# Patient Record
Sex: FEMALE | Race: WHITE | NOT HISPANIC OR LATINO | Employment: OTHER | URBAN - METROPOLITAN AREA
[De-identification: names, ages, dates, MRNs, and addresses within clinical notes are randomized per-mention and may not be internally consistent; named-entity substitution may affect disease eponyms.]

---

## 2019-12-20 ENCOUNTER — RECORDS - HEALTHEAST (OUTPATIENT)
Dept: LAB | Facility: CLINIC | Age: 79
End: 2019-12-20

## 2019-12-20 LAB
ANION GAP SERPL CALCULATED.3IONS-SCNC: 8 MMOL/L (ref 5–18)
BUN SERPL-MCNC: 34 MG/DL (ref 8–28)
CALCIUM SERPL-MCNC: 9.2 MG/DL (ref 8.5–10.5)
CHLORIDE BLD-SCNC: 104 MMOL/L (ref 98–107)
CO2 SERPL-SCNC: 26 MMOL/L (ref 22–31)
CREAT SERPL-MCNC: 1.28 MG/DL (ref 0.6–1.1)
ERYTHROCYTE [DISTWIDTH] IN BLOOD BY AUTOMATED COUNT: 16.9 % (ref 11–14.5)
GFR SERPL CREATININE-BSD FRML MDRD: 40 ML/MIN/1.73M2
GLUCOSE BLD-MCNC: 126 MG/DL (ref 70–125)
HCT VFR BLD AUTO: 26.6 % (ref 35–47)
HGB BLD-MCNC: 7.9 G/DL (ref 12–16)
MCH RBC QN AUTO: 25.1 PG (ref 27–34)
MCHC RBC AUTO-ENTMCNC: 29.7 G/DL (ref 32–36)
MCV RBC AUTO: 84 FL (ref 80–100)
PLATELET # BLD AUTO: 270 THOU/UL (ref 140–440)
PMV BLD AUTO: 11.1 FL (ref 8.5–12.5)
POTASSIUM BLD-SCNC: 4.4 MMOL/L (ref 3.5–5)
RBC # BLD AUTO: 3.15 MILL/UL (ref 3.8–5.4)
SODIUM SERPL-SCNC: 138 MMOL/L (ref 136–145)
WBC: 9.4 THOU/UL (ref 4–11)

## 2019-12-23 ENCOUNTER — RECORDS - HEALTHEAST (OUTPATIENT)
Dept: LAB | Facility: CLINIC | Age: 79
End: 2019-12-23

## 2019-12-23 LAB
ERYTHROCYTE [DISTWIDTH] IN BLOOD BY AUTOMATED COUNT: 17 % (ref 11–14.5)
HCT VFR BLD AUTO: 28.1 % (ref 35–47)
HGB BLD-MCNC: 8 G/DL (ref 12–16)
INR PPP: 4.21 (ref 0.9–1.1)
MCH RBC QN AUTO: 24.8 PG (ref 27–34)
MCHC RBC AUTO-ENTMCNC: 28.5 G/DL (ref 32–36)
MCV RBC AUTO: 87 FL (ref 80–100)
PLATELET # BLD AUTO: 324 THOU/UL (ref 140–440)
PMV BLD AUTO: 11.1 FL (ref 8.5–12.5)
RBC # BLD AUTO: 3.23 MILL/UL (ref 3.8–5.4)
WBC: 7.3 THOU/UL (ref 4–11)

## 2019-12-25 ENCOUNTER — RECORDS - HEALTHEAST (OUTPATIENT)
Dept: LAB | Facility: CLINIC | Age: 79
End: 2019-12-25

## 2019-12-25 LAB
C DIFF TOX B STL QL: NEGATIVE
RIBOTYPE 027/NAP1/BI: NORMAL

## 2019-12-31 ENCOUNTER — RECORDS - HEALTHEAST (OUTPATIENT)
Dept: LAB | Facility: CLINIC | Age: 79
End: 2019-12-31

## 2019-12-31 LAB
ANION GAP SERPL CALCULATED.3IONS-SCNC: 10 MMOL/L (ref 5–18)
BUN SERPL-MCNC: 19 MG/DL (ref 8–28)
CALCIUM SERPL-MCNC: 9.6 MG/DL (ref 8.5–10.5)
CHLORIDE BLD-SCNC: 103 MMOL/L (ref 98–107)
CO2 SERPL-SCNC: 27 MMOL/L (ref 22–31)
CREAT SERPL-MCNC: 1.09 MG/DL (ref 0.6–1.1)
ERYTHROCYTE [DISTWIDTH] IN BLOOD BY AUTOMATED COUNT: 18.2 % (ref 11–14.5)
GFR SERPL CREATININE-BSD FRML MDRD: 49 ML/MIN/1.73M2
GLUCOSE BLD-MCNC: 142 MG/DL (ref 70–125)
HCT VFR BLD AUTO: 31.1 % (ref 35–47)
HGB BLD-MCNC: 9.2 G/DL (ref 12–16)
MAGNESIUM SERPL-MCNC: 1.6 MG/DL (ref 1.8–2.6)
MCH RBC QN AUTO: 25.4 PG (ref 27–34)
MCHC RBC AUTO-ENTMCNC: 29.6 G/DL (ref 32–36)
MCV RBC AUTO: 86 FL (ref 80–100)
PLATELET # BLD AUTO: 504 THOU/UL (ref 140–440)
PMV BLD AUTO: 10.5 FL (ref 8.5–12.5)
POTASSIUM BLD-SCNC: 4.5 MMOL/L (ref 3.5–5)
RBC # BLD AUTO: 3.62 MILL/UL (ref 3.8–5.4)
SODIUM SERPL-SCNC: 140 MMOL/L (ref 136–145)
WBC: 7.2 THOU/UL (ref 4–11)

## 2022-04-14 ENCOUNTER — TRANSCRIBE ORDERS (OUTPATIENT)
Dept: VASCULAR SURGERY | Facility: CLINIC | Age: 82
End: 2022-04-14
Payer: COMMERCIAL

## 2022-04-14 DIAGNOSIS — L97.511 RIGHT FOOT ULCER, LIMITED TO BREAKDOWN OF SKIN (H): Primary | ICD-10-CM

## 2022-04-14 DIAGNOSIS — E11.42 DIABETIC POLYNEUROPATHY (H): ICD-10-CM

## 2022-04-29 ENCOUNTER — OFFICE VISIT (OUTPATIENT)
Dept: VASCULAR SURGERY | Facility: CLINIC | Age: 82
End: 2022-04-29
Attending: PODIATRIST
Payer: COMMERCIAL

## 2022-04-29 ENCOUNTER — ANCILLARY PROCEDURE (OUTPATIENT)
Dept: VASCULAR ULTRASOUND | Facility: CLINIC | Age: 82
End: 2022-04-29
Attending: PODIATRIST
Payer: COMMERCIAL

## 2022-04-29 VITALS — TEMPERATURE: 97.6 F | HEART RATE: 88 BPM | DIASTOLIC BLOOD PRESSURE: 72 MMHG | SYSTOLIC BLOOD PRESSURE: 142 MMHG

## 2022-04-29 DIAGNOSIS — E11.42 DIABETIC POLYNEUROPATHY (H): ICD-10-CM

## 2022-04-29 DIAGNOSIS — M25.371 ANKLE INSTABILITY, RIGHT: ICD-10-CM

## 2022-04-29 DIAGNOSIS — L97.511 RIGHT FOOT ULCER, LIMITED TO BREAKDOWN OF SKIN (H): ICD-10-CM

## 2022-04-29 DIAGNOSIS — L97.511 DIABETIC ULCER OF TOE OF RIGHT FOOT ASSOCIATED WITH TYPE 2 DIABETES MELLITUS, LIMITED TO BREAKDOWN OF SKIN (H): Primary | ICD-10-CM

## 2022-04-29 DIAGNOSIS — E11.621 DIABETIC ULCER OF TOE OF RIGHT FOOT ASSOCIATED WITH TYPE 2 DIABETES MELLITUS, LIMITED TO BREAKDOWN OF SKIN (H): Primary | ICD-10-CM

## 2022-04-29 PROCEDURE — 93922 UPR/L XTREMITY ART 2 LEVELS: CPT | Mod: 26 | Performed by: SURGERY

## 2022-04-29 PROCEDURE — 93922 UPR/L XTREMITY ART 2 LEVELS: CPT

## 2022-04-29 PROCEDURE — 93925 LOWER EXTREMITY STUDY: CPT | Mod: 26 | Performed by: SURGERY

## 2022-04-29 PROCEDURE — 99203 OFFICE O/P NEW LOW 30 MIN: CPT | Mod: 25 | Performed by: PODIATRIST

## 2022-04-29 PROCEDURE — 93925 LOWER EXTREMITY STUDY: CPT

## 2022-04-29 PROCEDURE — 11042 DBRDMT SUBQ TIS 1ST 20SQCM/<: CPT | Performed by: PODIATRIST

## 2022-04-29 PROCEDURE — G0463 HOSPITAL OUTPT CLINIC VISIT: HCPCS

## 2022-04-29 RX ORDER — GLIPIZIDE 10 MG/1
10 TABLET, FILM COATED, EXTENDED RELEASE ORAL 2 TIMES DAILY
COMMUNITY
Start: 2021-10-21 | End: 2023-04-26

## 2022-04-29 RX ORDER — FUROSEMIDE 20 MG
40 TABLET ORAL DAILY
COMMUNITY
Start: 2022-01-26

## 2022-04-29 RX ORDER — ATORVASTATIN CALCIUM 80 MG/1
TABLET, FILM COATED ORAL
COMMUNITY
Start: 2022-01-26 | End: 2023-01-25

## 2022-04-29 RX ORDER — ALLOPURINOL 100 MG/1
200 TABLET ORAL
COMMUNITY
Start: 2022-02-08 | End: 2023-01-25

## 2022-04-29 RX ORDER — WARFARIN SODIUM 2 MG/1
TABLET ORAL
COMMUNITY
Start: 2021-04-09

## 2022-04-29 RX ORDER — ASPIRIN 81 MG/1
TABLET, CHEWABLE ORAL
COMMUNITY

## 2022-04-29 RX ORDER — BLOOD SUGAR DIAGNOSTIC
1 STRIP MISCELLANEOUS DAILY
COMMUNITY
Start: 2021-11-23

## 2022-04-29 RX ORDER — MULTIVITAMIN WITH IRON
2 TABLET ORAL 2 TIMES DAILY
COMMUNITY

## 2022-04-29 RX ORDER — SODIUM FLUORIDE 5 MG/G
GEL, DENTIFRICE DENTAL
COMMUNITY
Start: 2021-09-20

## 2022-04-29 RX ORDER — HYOSCYAMINE SULFATE 0.125 MG
0.12 TABLET ORAL
COMMUNITY
Start: 2022-03-22

## 2022-04-29 RX ORDER — AMLODIPINE BESYLATE 5 MG/1
TABLET ORAL
COMMUNITY
Start: 2022-04-25

## 2022-04-29 RX ORDER — METOPROLOL SUCCINATE 50 MG/1
150 TABLET, EXTENDED RELEASE ORAL
COMMUNITY
Start: 2022-01-26

## 2022-04-29 RX ORDER — LINAGLIPTIN 5 MG/1
5 TABLET, FILM COATED ORAL DAILY
COMMUNITY
Start: 2022-01-03 | End: 2023-01-25 | Stop reason: ALTCHOICE

## 2022-04-29 RX ORDER — CILOSTAZOL 100 MG/1
100 TABLET ORAL 2 TIMES DAILY
COMMUNITY
Start: 2021-08-26 | End: 2022-08-26

## 2022-04-29 RX ORDER — BUPROPION HYDROCHLORIDE 75 MG/1
TABLET ORAL
COMMUNITY
Start: 2021-08-27

## 2022-04-29 RX ORDER — LISINOPRIL 5 MG/1
5 TABLET ORAL DAILY
COMMUNITY
Start: 2022-03-01

## 2022-04-29 ASSESSMENT — PAIN SCALES - GENERAL: PAINLEVEL: NO PAIN (0)

## 2022-04-29 NOTE — PATIENT INSTRUCTIONS
"Important lnstructions    Stop upstairs to [address] &   your CAM boot & wheelchair .    Call [phone] to schedule your MRI      WEIGHT BEARING STATUS: You are to remain NON WEIGHT BEARING on your right foot. NON WEIGHT BEARING MEANS NO PRESSURE ON YOUR FOOT OR HEEL AT ANY TIME FOR ANY REASON!    2. OFFLOADING DEVICE: Must use a A WHEELCHAIR at all times! (do not use affected foot to push wheelchair)    3. STABILIZATION DEVICE: Use a CAM BOOT . You will need to WEAR THIS ANYTIME YOU ARE UP AND OUT OF BED, IT IS OKAY TO REMOVE WHEN YOU ARE SLEEPING..       4. ELEVATE: Elevating your leg means laying with your head on a pillow and your foot ABOVE YOUR WAIST.     5. DO NOT MOVE YOUR FOOT.  There is a risk of worsening the wound or incision. To give yourself a higher chance of healing, please DO NOT swing foot back and forth and wiggle foot/toes especially when inside a stabilization device.        Dressing Change lnstructions                 EVERY OTHER DAY and as needed, Cleanse your foot wound(s) with Normal saline.    Pat Dry with non-sterile gauze    Primary Dressing: Apply Medihoney or Manuka honey into/onto the wounds    Secondary dressing: Cover with dry gauze    Secure with non-sterile roll gauze (4\" x 75\" roll) and tape (1\" roll tape) as needed; avoid adhesive directly on the skin    It IS NOT ok to get your wound wet in the bath or shower    SEEK MEDICAL CARE IF:  You have an increase in swelling, pain, or redness around the wound.  You have an increase in the amount of pus coming from the wound.  There is a bad smell coming from the wound.  The wound appears to be worsening/enlarging  You have a fever greater than 101.5 F      It is ok to continue current wound care treatment/products for the next 2-3 days until new wound care supplies are ordered and arrive. If longer than this please contact our office at [phone].        We want to hear from you!   In the next few weeks, you should receive " a call or email to complete a survey about your visit at [facility]. Please help us improve your appointment experience by letting us know how we did today. We strive to make your experience good and value any ways in which we could do better.      We value your input and suggestions.    Thank you for choosing the [facility]!

## 2022-04-29 NOTE — PROGRESS NOTES
FOOT AND ANKLE SURGERY/PODIATRY CONSULT NOTE        ASSESSMENT: Diabetic Ulceration right hallux         TREATMENT:  -The right hallux ulceration is stable, no signs of infection.     -I discussed the principles of wound healing today including the importance of limited walking on the involved limb, good vascular perfusion, good glycemic control and the absence of infection.     -Due to chronicity of the wound and pain, I have referred her for an MRI to evaluate for underlying osteomyelitis.     -MADHU's indicate TBI's adequate for wound healing.     -HbA1c from 3/4 was 8.7. Referred to endocrinology.     -Referred for a CAM boot and wheelchair.     -After discussion of risk factors and consent obtained 2% Lidocaine HCL jelly was applied, under clean conditions, the right and foot ulceration(s) were debrided using .into the subcutaneous tissue.  Devitalized and nonviable tissue, along with any fibrin and slough, was removed to improve granulation tissue formation, stimulate wound healing, decrease overall bacteria load, disrupt biofilm formation and decrease edge senescence. Wound drainage was scant No. Total excisional debridement was 0.15 sq cm into the subcutaneous tissue with a depth of 0.3 cm.   Ulcers were improved afterwards and .  Measures were as noted on the flow sheet. Medi-honey with a gauze dressing was applied. She will continue to apply Medi-honey with a gauze dressing qoday.    -She will follow-up in 3 weeks. I will contact the patient with the MRI report when available and we will be guided by the results.     Wily Enciso DPM  [facility]      HPI: Taylor Sanford was seen today for a sore on her right hallux. The patient states she has had the sore for 1-2 years, and has seen a podiatrist at [facility]. She has not noticed improvement and would like a second opinion. She also complains of pain in the right hallux. PMH significant for DM2.     No past medical  history on file.    No past surgical history on file.     Allergies   Allergen Reactions     Amiodarone      Developed Thyroid toxicity     Sulfonylureas Rash         Current Outpatient Medications:      allopurinol (ZYLOPRIM) 100 MG tablet, Take 200 mg by mouth, Disp: , Rfl:      amLODIPine (NORVASC) 5 MG tablet, amlodipine 5 mg tablet, Disp: , Rfl:      aspirin (ASA) 81 MG chewable tablet, Asprin Ec Low Dose, Disp: , Rfl:      atorvastatin (LIPITOR) 80 MG tablet, atorvastatin 80 mg tablet, Disp: , Rfl:      blood glucose (ACCU-CHEK ADIS PLUS) test strip, USE TO TEST THREE TIMES DAILY, Disp: , Rfl:      buPROPion (WELLBUTRIN) 75 MG tablet, bupropion HCl 75 mg tablet, Disp: , Rfl:      cilostazol (PLETAL) 100 MG tablet, Take 100 mg by mouth, Disp: , Rfl:      furosemide (LASIX) 20 MG tablet, furosemide 20 mg tablet  TAKE 1 TABLET BY MOUTH DAILY, Disp: , Rfl:      glipiZIDE (GLUCOTROL XL) 10 MG 24 hr tablet, Take 10 mg by mouth, Disp: , Rfl:      hyoscyamine (LEVSIN) 0.125 MG tablet, Take 0.125 mg by mouth, Disp: , Rfl:      linagliptin (TRADJENTA) 5 MG TABS tablet, Tradjenta 5 mg tablet  TAKE 1 TABLET BY MOUTH DAILY, Disp: , Rfl:      lisinopril (ZESTRIL) 5 MG tablet, lisinopril 5 mg tablet, Disp: , Rfl:      magnesium 100 MG TABS, magnesium, Disp: , Rfl:      metoprolol succinate ER (TOPROL-XL) 50 MG 24 hr tablet, metoprolol succinate ER 50 mg tablet,extended release 24 hr, Disp: , Rfl:      omeprazole (PRILOSEC) 20 MG DR capsule, TAKE 1 CAPSULE DAILY AS NEEDED 1 HOUR BEFORE A MEAL FOR HEARTBURN. GENERIC FOR PRILOSEC, Disp: , Rfl:      sodium fluoride dental gel (PREVIDENT) 1.1 % GEL topical gel, Brush 2x/day.  Do not eat or drink for 30 minutes after., Disp: , Rfl:      warfarin ANTICOAGULANT (COUMADIN) 2 MG tablet, warfarin 2 mg tablet, Disp: , Rfl:     Social History     Social History Narrative     Not on file       No family history on file.    Review of Systems - 10 point Review of Systems is negative except  for right hallux ulcer which is noted in HPI.      OBJECTIVE:  Appearance: alert, well appearing, and in no distress.    BP (!) 142/72   Pulse 88   Temp 97.6  F (36.4  C)     BMI= There is no height or weight on file to calculate BMI.    General appearance: Patient is alert and fully cooperative with history & exam.  No sign of distress is noted during the visit.     Psychiatric: Affect is pleasant & appropriate.  Patient appears motivated to improve health.     Respiratory: Breathing is regular & unlabored while sitting.     HEENT: Hearing is intact to spoken word.  Speech is clear.  No gross evidence of visual impairment that would impact ambulation.    Vascular: Dorsalis pedis trace palpable palpableRight.  Dermatologic:   VASC Wound RIGHT HALLUX (Active)   Post Size Length 0.5 04/29/22 1200   Post Size Width 0.3 04/29/22 1200   Post Size Depth 0.3 04/29/22 1200   Post Total Sq cm 0.15 04/29/22 1200   Ulceration plantar medial right hallux has a granular base, slight increased depth, no erythema.   Neurologic: Diminished to light touch Right.  Musculoskeletal: Contracted digits noted Right.    @LDAVASC(10,16,17)@    Imaging:     No results found.

## 2022-05-04 ENCOUNTER — MEDICAL CORRESPONDENCE (OUTPATIENT)
Dept: HEALTH INFORMATION MANAGEMENT | Facility: CLINIC | Age: 82
End: 2022-05-04

## 2022-05-04 ENCOUNTER — OFFICE VISIT (OUTPATIENT)
Dept: ENDOCRINOLOGY | Facility: CLINIC | Age: 82
End: 2022-05-04
Attending: PODIATRIST
Payer: COMMERCIAL

## 2022-05-04 VITALS — DIASTOLIC BLOOD PRESSURE: 60 MMHG | SYSTOLIC BLOOD PRESSURE: 110 MMHG | WEIGHT: 221.5 LBS | HEART RATE: 76 BPM

## 2022-05-04 DIAGNOSIS — E11.621 DIABETIC ULCER OF TOE OF RIGHT FOOT ASSOCIATED WITH TYPE 2 DIABETES MELLITUS, LIMITED TO BREAKDOWN OF SKIN (H): ICD-10-CM

## 2022-05-04 DIAGNOSIS — L97.511 DIABETIC ULCER OF TOE OF RIGHT FOOT ASSOCIATED WITH TYPE 2 DIABETES MELLITUS, LIMITED TO BREAKDOWN OF SKIN (H): ICD-10-CM

## 2022-05-04 PROBLEM — I48.0 PAROXYSMAL ATRIAL FIBRILLATION (H): Status: ACTIVE | Noted: 2022-05-04

## 2022-05-04 PROBLEM — E78.2 MIXED HYPERLIPIDEMIA: Status: ACTIVE | Noted: 2022-05-04

## 2022-05-04 PROBLEM — I10 BENIGN ESSENTIAL HYPERTENSION: Status: ACTIVE | Noted: 2022-05-04

## 2022-05-04 PROBLEM — I25.10 CAD (CORONARY ARTERY DISEASE): Status: ACTIVE | Noted: 2022-05-04

## 2022-05-04 PROCEDURE — 99205 OFFICE O/P NEW HI 60 MIN: CPT | Performed by: INTERNAL MEDICINE

## 2022-05-04 RX ORDER — ACARBOSE 25 MG/1
TABLET ORAL
Qty: 180 TABLET | Refills: 1 | Status: SHIPPED | OUTPATIENT
Start: 2022-05-04 | End: 2023-01-25 | Stop reason: SINTOL

## 2022-05-04 NOTE — LETTER
5/4/2022         RE: Taylor Sanford  [address] [address]        Dear Colleague,    Thank you for referring your patient, Taylor Sanford, to the [facility]. Please see a copy of my visit note below.      ENDOCRINOLOGY NEW PATIENT VISIT        HISTORY OF PRESENT ILLNESS    Taylor Sanford is seen in consultation at the request of Dr. Enciso for diabetes.    Patient estimates she was diagnosed with type 2 diabetes approximately 25 years ago.  Initially diet controlled.  Has since been on oral medication regimen: Metformin had to be discontinued due to change in kidney function.    Her history is remarkable for chronic kidney disease (does not recall being evaluated in nephrology), atrial fibrillation, coronary artery disease status post PCI in 2002, hyperlipidemia, hypertension and depression.    Current diabetes regimen: Tradjenta 5 mg daily, glipizide XL 10 mg twice daily.    Would like to avoid injectable medications as much as possible.    No known diabetic retinopathy: Has annual eye exam at [facility].  Has been noting more right eye pain.  Has peripheral neuropathy: Endorses numbness and tingling in feet.  Also following in podiatry with Dr. Enciso for right hallux ulceration.    Has had more stress recently: Her brother passed away, struggles with urine and stool incontinence, has foot ulceration for which she is following in podiatry.  All of these factors have contributed to her not eating as well as she would like.    Typically has 2-3 meals per day (more recently, has been having a small snack rather than a lunchtime meal).  Breakfast is usually cereal or oatmeal with fruit.  Lunch may be cheese or yogurt.  Dinner is her largest meal of the day (yesterday had to turkey meatballs and [place] rice).  Often snacks on foods in the evening.    Typically checks glucose first thing in the morning.  I reviewed glucose meter data.  Average glucose 161  in the past 2 weeks.  No recorded hypoglycemia.    She does not have history of hypoglycemia requiring hospitalization.  No history of severe hypoglycemia requiring the aid of another person or causing loss of consciousness.    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 Thyroid ultrasound 2011 showed heterogenous thyroid parenchyma: two subcentimeter thyroid nodules were noted, one in left and another in right lobe.    Most recent thyroid US 9/24/2013 showed the following:  FINDINGS:   The right lobe measures 4.7 x 2.3 x 2.1 cm. There is a spongiform 0.4 x 0.3    x 0.4 cm nodule in the midpole, previously measuring 0.3 x 0.2 cm.     The left lobe measures 4.1 x 1.6 x 1.5 cm.  There is an isoechoic nodule in    the lower pole measuring 0.6 x 0.5 x 0.5 cm unchanged. It has little   vascularity.     The isthmus measures 0.4 cm. There are no nodules present     IMPRESSION: No significant change from the previous exam. Bilateral   subcentimeter thyroid nodules that appear benign.      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  Had 2 children, a son and a daughter; her daughter passed away.    PAST MEDICAL HISTORY  Past Medical History:   Diagnosis Date     Benign essential hypertension      CAD (coronary artery disease)      Mixed hyperlipidemia      Paroxysmal atrial fibrillation (H)      Type 2 diabetes mellitus with skin complication, without long-term current use of insulin (H)        MEDICATIONS  Current Outpatient Medications   Medication Sig Dispense Refill     allopurinol (ZYLOPRIM) 100 MG tablet Take 200 mg by mouth       amLODIPine (NORVASC) 5 MG tablet amlodipine 5 mg tablet       aspirin (ASA) 81 MG chewable tablet Asprin Ec Low Dose        atorvastatin (LIPITOR) 80 MG tablet atorvastatin 80 mg tablet       buPROPion (WELLBUTRIN) 75 MG tablet One and a half tablets bid       Calcium Citrate (CITRACAL OR) Take 1 tablet by mouth daily       cilostazol (PLETAL) 100 MG tablet Take 100 mg by mouth 2 times daily       furosemide (LASIX) 20 MG tablet 40 mg daily       glipiZIDE (GLUCOTROL XL) 10 MG 24 hr tablet Take 10 mg by mouth 2 times daily       linagliptin (TRADJENTA) 5 MG TABS tablet 5 mg daily       lisinopril (ZESTRIL) 5 MG tablet 5 mg daily       magnesium 250 MG tablet 2 tablets 2 times daily       metoprolol succinate ER (TOPROL-XL) 50 MG 24 hr tablet 150 mg       omeprazole (PRILOSEC) 20 MG DR capsule TAKE 1 CAPSULE DAILY AS NEEDED 1 HOUR BEFORE A MEAL FOR HEARTBURN. GENERIC FOR PRILOSEC       sodium fluoride dental gel (PREVIDENT) 1.1 % GEL topical gel Brush 2x/day.  Do not eat or drink for 30 minutes after.       warfarin ANTICOAGULANT (COUMADIN) 2 MG tablet warfarin 2 mg tablet       blood glucose (ACCU-CHEK ADIS PLUS) test strip USE TO TEST THREE TIMES DAILY       hyoscyamine (LEVSIN) 0.125 MG tablet Take 0.125 mg by mouth (Patient not taking: Reported on 5/4/2022)         Allergies, family, and social history were reviewed and documented as needed in EHR.     REVIEW OF SYSTEMS  A complete 10-point ROS was performed and pertinent positives and negatives are noted in the HPI.     PHYSICAL EXAM  /60 (BP Location: Right arm, Patient Position: Sitting, Cuff Size: Adult Large)   Pulse 76   Wt 100.5 kg (221 lb 8 oz)   There is no height or weight on file to calculate BMI.  Constitutional: Vital signs reviewed, as recorded above. Patient is alert, oriented and appears in no acute distress.  Eyes: PER, EOMI, no stare, lid lag, or retraction; no conjunctival injection.  Neck: Neck supple, no palpable thyromegaly.  Lymphatic: No cervical or supraclavicular LAD.  Cardiovascular: RRR, normal S1/S2, no audible murmurs, rubs or gallops,  bilateral 1+ LE edema.  Respiratory: CTAB, without wheezes, crackles or rhonchi; normal chest wall motion and respiratory effort.  GI: Soft.  MSK: No clubbing or cyanosis; normal muscle bulk and tone.  Skin: Normal skin color, temperature, turgor and texture.  Neurological: Alert and oriented times 3. No tremor.    Foot exam deferred today, right hallux ulceration is dressed.    DATA REVIEW  Each of the following laboratory and/or imaging studies were reviewed.  Reviewed outside results from [facility] in Care Everywhere.  3/4/2022: A1c 8.7 (8.3 in 12/2021, 7.9 in 9/2021, 7.7 in 7/2021)  10/7/2021: Creatinine 1.62, EGFR 30    ASSESSMENT  1.  Diabetes mellitus, type 2.  With hyperglycemia based on last hemoglobin A1c, although the patient is checking fasting glucose only and we may not be capturing hypoglycemia on fingerstick glucose data.  CKD limits our medication options.  Given coexisting coronary artery disease, would be a candidate for GLP-1 receptor agonist; the patient is hesitant to use an injectable medication and moreover does not think she would increase hydration with this class of drugs due to issues with urinary incontinence (an important consideration in context of CKD to avoid worsening renal function with addition of this class of drug).  She is already on Tradjenta and glipizide at maximal doses, so would consider adding acarbose as an option for an oral medication--we discussed benefits and potential side effects.  She will also work on dietary changes aimed at improving glycemic control (does snack on foods which contain refined sugars): I will refer to diabetes care and  for guidance on this change.    2.  Diabetes preventive care.  -No known diabetic retinopathy, has annual eye exam at [facility]; my review of Care Everywhere records indicated no DR on eye exam in 7/2021  -CKD, does not follow in nephrology; check urine microalbumin screen at next visit, follow  creatinine  -Has peripheral neuropathic symptoms, following with Dr. Enciso for right hallux ulceration    3.  Hypertension.  Blood pressure controlled.    4.  Hyperlipidemia.  On statin therapy.    5.  Coronary artery disease and paroxysmal atrial fibrillation.  Following in cardiology.    6. History of hyperthyroidism. Clinically appears euthyroid. Check thyroid function tests with next lab draw.    7. History of thyroid nodules. Subcentimeter and stable based on last US report from 2013. Consider follow-up US in the future, once more acute issues have stabilized.    PLAN  -Start Acarbose 25 mg with first bite of breakfast and dinner  -Continue Tradjenta and glipizide without changes  -Referral to diabetes education--please work on dietary changes to minimize refined sugars  -Check blood glucose once daily but at alternating times, first thin in the morning on one day and before dinner on another; also check with any unusual symptoms that could be due to low blood glucose  -Return for a follow-up visit in 2 months, with labs before visit  -We will communicate results by letter, or if needed by phone      Orders Placed This Encounter   Procedures     Hemoglobin A1c     Comprehensive metabolic panel     TSH with free T4 reflex     Albumin Random Urine Quantitative with Creat Ratio     AMB Adult Diabetes Educator Referral         I spent a total of 74 minutes on the date of encounter reviewing medical records, evaluating the patient, coordinating care and documenting in the EHR, as detailed above.      Adriano Sampson MD   Division of Diabetes, Endocrinology and Metabolism  Department of Medicine      cc: Wily Enciso DPM; Pelon Drew MD             Again, thank you for allowing me to participate in the care of your patient.        Sincerely,        ADRIANO Sampson MD

## 2022-05-04 NOTE — PATIENT INSTRUCTIONS
-Start Acarbose 25 mg with first bite of breakfast and dinner  -Continue Tradjenta and glipizide without changes  -Referral to diabetes education--please work on dietary changes to minimize refined sugars  -Check blood glucose once daily but at alternating times, first thin in the morning on one day and before dinner on another; also check with any unusual symptoms that could be due to low blood glucose  -Return for a follow-up visit in 2 months, with labs before visit  -We will communicate results by letter, or if needed by phone

## 2022-05-20 ENCOUNTER — OFFICE VISIT (OUTPATIENT)
Dept: VASCULAR SURGERY | Facility: CLINIC | Age: 82
End: 2022-05-20
Attending: PODIATRIST
Payer: COMMERCIAL

## 2022-05-20 ENCOUNTER — HOSPITAL ENCOUNTER (OUTPATIENT)
Dept: GENERAL RADIOLOGY | Facility: HOSPITAL | Age: 82
Discharge: HOME | End: 2022-05-20
Attending: PODIATRIST
Payer: COMMERCIAL

## 2022-05-20 VITALS — SYSTOLIC BLOOD PRESSURE: 128 MMHG | TEMPERATURE: 97.7 F | DIASTOLIC BLOOD PRESSURE: 66 MMHG | HEART RATE: 72 BPM

## 2022-05-20 DIAGNOSIS — S90.121A CONTUSION OF THIRD TOE OF RIGHT FOOT, INITIAL ENCOUNTER: ICD-10-CM

## 2022-05-20 DIAGNOSIS — L97.511 DIABETIC ULCER OF TOE OF RIGHT FOOT ASSOCIATED WITH TYPE 2 DIABETES MELLITUS, LIMITED TO BREAKDOWN OF SKIN (H): Primary | ICD-10-CM

## 2022-05-20 DIAGNOSIS — E11.621 DIABETIC ULCER OF TOE OF RIGHT FOOT ASSOCIATED WITH TYPE 2 DIABETES MELLITUS, LIMITED TO BREAKDOWN OF SKIN (H): Primary | ICD-10-CM

## 2022-05-20 PROCEDURE — 73630 X-RAY EXAM OF FOOT: CPT | Mod: 26 | Performed by: PODIATRIST

## 2022-05-20 PROCEDURE — 73630 X-RAY EXAM OF FOOT: CPT | Mod: RT,FY

## 2022-05-20 PROCEDURE — 99213 OFFICE O/P EST LOW 20 MIN: CPT | Mod: 25 | Performed by: PODIATRIST

## 2022-05-20 PROCEDURE — 11042 DBRDMT SUBQ TIS 1ST 20SQCM/<: CPT | Performed by: PODIATRIST

## 2022-05-20 ASSESSMENT — PAIN SCALES - GENERAL: PAINLEVEL: NO PAIN (0)

## 2022-05-20 NOTE — PATIENT INSTRUCTIONS
"Important lnstructions    STOP DOWN STAIRS FOR YOUR XRAY       WEIGHT BEARING STATUS: You are to remain LIMITED WEIGHT BEARING on your right foot. LIMITED WEIGHT BEARING MEANS THAT IT IS ONLY OKAY FOR YOU TO APPLY LIGHT PRESSURE ON THE AFFECTED FOOT WHEN TRANSFERRING FROM YOUR ASSISTIVE DEVICE TO A CHAIR OR BED.    2. OFFLOADING DEVICE: Must use a A WHEELCHAIR at all times! (do not use affected foot to push wheelchair)    3. STABILIZATION DEVICE: Use a CAM BOOT . You will need to WEAR THIS ANYTIME YOU ARE UP AND OUT OF BED, IT IS OKAY TO REMOVE WHEN YOU ARE SLEEPING..       4. ELEVATE: Elevating your leg means laying with your head on a pillow and your foot ABOVE YOUR WAIST.     5. DO NOT MOVE YOUR FOOT.  There is a risk of worsening the wound or incision. To give yourself a higher chance of healing, please DO NOT swing foot back and forth and wiggle foot/toes especially when inside a stabilization device.        Dressing Change lnstructions                 EVERY OTHER DAY and as needed, Cleanse your foot wound(s) with Normal saline.    Pat Dry with non-sterile gauze    Primary Dressing: Apply Medihoney or Manuka honey into/onto the wounds    Secondary dressing: Cover with dry gauze    Secure with non-sterile roll gauze (4\" x 75\" roll) and tape (1\" roll tape) as needed; avoid adhesive directly on the skin    It IS NOT ok to get your wound wet in the bath or shower    SEEK MEDICAL CARE IF:  You have an increase in swelling, pain, or redness around the wound.  You have an increase in the amount of pus coming from the wound.  There is a bad smell coming from the wound.  The wound appears to be worsening/enlarging  You have a fever greater than 101.5 F      It is ok to continue current wound care treatment/products for the next 2-3 days until new wound care supplies are ordered and arrive. If longer than this please contact our office at [phone].        We want to hear from you!   In the next few weeks, you should " receive a call or email to complete a survey about your visit at [facility]. Please help us improve your appointment experience by letting us know how we did today. We strive to make your experience good and value any ways in which we could do better.      We value your input and suggestions.    Thank you for choosing the [facility]!

## 2022-05-20 NOTE — LETTER
[facility]  [address] [place] [address] [address]  [place], [place] [address]  [phone]      Fax [phone]    2022    [facility]  Fax: [phone] Wound Dressing Rx and Order Form  Customer Service: [phone] Order Status: NEW   Verbal: Aissatou Vincent  Patient Info:  Name: Taylor Sanford  : 1940  Address: [address] [place] [address]  [phone] (home)           Insurance Info:  INSURER: Payor: [email or web address] / Plan: [employer]AGE / Product Type: HMO /   Policy ID#:  [id number]  SECONDARY INSURANCE:    Secondary Policy ID#:  N/A    Physician Info:   Name: Wily Enciso DPM   Dept Address/Phones:   [address] [place] [address], [address]  [facility] [address] [phone]  Fax: [phone]      Impression:   Encounter Diagnoses   Name Primary?     Diabetic ulcer of toe of right foot associated with type 2 diabetes mellitus, limited to breakdown of skin (H) Yes     Contusion of third toe of right foot, initial encounter        Lymphedema circumferential measurements (in cm):              Wound info:    VASC Wound RIGHT HALLUX (Active)   Post Size Length 0.3 22 1000   Post Size Width 0.3 22 1000   Post Size Depth 0.2 22 1000   Post Total Sq cm 0.09 22 1000   Description callous 22 1000         Drainage: yes  Thickness:  full  Duration of Need: 3 months  Days Supply: 30  Start Date: 2022  Starter Kit:ancillary  Qualifying wound/Debridement: Yes/yes     Dressing Type Brand Size Number of pieces Frequency of change   Primary Manuka honey HD Supra lite   4''x5'' 15 EVERY OTHER DAY                                            Secondary         medipore+Pad   2''x2 3/4'' 15 Every other day                           Tape            No substitutions preferred. Call [phone].       OK to forward to covered supplier.    Electronically Signed Physician: WILY ENCISO                          Date: 5/20/2022'

## 2022-05-20 NOTE — PROGRESS NOTES
FOOT AND ANKLE SURGERY/PODIATRY Progress Note      ASSESSMENT:   Diabetic Ulceration right hallux   Contusion 3rd digit right foot       TREATMENT:  Ulceration right hallux: The right hallux ulceration is measuring smaller today. She will continue with medihoney, limited walking in the CAM boot.    -Awaiting MRI on the right foot next week.     -After discussion of risk factors and consent obtained 2% Lidocaine HCL jelly was applied, under clean conditions, the right and foot ulceration(s) were debrided using #15 blade scalpel.  Devitalized and nonviable tissue, along with any fibrin and slough, was removed to improve granulation tissue formation, stimulate wound healing, decrease overall bacteria load, disrupt biofilm formation and decrease edge senescence. Wound drainage was scant No. Total excisional debridement was 0.09 sq cm into the subcutaneous tissue with a depth of 0.2 cm.   Ulcers were improved afterwards and .  Measures were as noted on the flow sheet. Medi-honey with a gauze dressing was applied. She will continue to apply Medi-honey with a gauze dressing qoday.    Contusion 3rd digit right foot: There is mild pain with edema 3rd digit right foot.     -I will contact the patient with the x-ray report when available and we will be guided by the results.     -She will follow-up in 3 weeks.    Wily Enciso DPM  [facility] [place]      HPI: Taylor HOLLY Lauraneel was seen again today for a right hallux ulceration. She has been applying medihoney as directed and limited walking in the CAM boot. She admits stubbing the 3rd toe right foot last week and is having pain with walking.     Past Medical History:   Diagnosis Date     Benign essential hypertension      CAD (coronary artery disease)      Mixed hyperlipidemia      Paroxysmal atrial fibrillation (H)      Type 2 diabetes mellitus with skin complication, without long-term current use of insulin (H)        No past surgical history on  file.    Allergies   Allergen Reactions     Amiodarone      Developed Thyroid toxicity     Sulfonylureas Rash         Current Outpatient Medications:      acarbose (PRECOSE) 25 MG tablet, 25 mg PO twice a day with first bite of breakfast and dinner., Disp: 180 tablet, Rfl: 1     allopurinol (ZYLOPRIM) 100 MG tablet, Take 200 mg by mouth, Disp: , Rfl:      amLODIPine (NORVASC) 5 MG tablet, amlodipine 5 mg tablet, Disp: , Rfl:      aspirin (ASA) 81 MG chewable tablet, Asprin Ec Low Dose, Disp: , Rfl:      atorvastatin (LIPITOR) 80 MG tablet, atorvastatin 80 mg tablet, Disp: , Rfl:      blood glucose (ACCU-CHEK ADIS PLUS) test strip, USE TO TEST THREE TIMES DAILY, Disp: , Rfl:      buPROPion (WELLBUTRIN) 75 MG tablet, One and a half tablets bid, Disp: , Rfl:      Calcium Citrate (CITRACAL OR), Take 1 tablet by mouth daily, Disp: , Rfl:      cilostazol (PLETAL) 100 MG tablet, Take 100 mg by mouth 2 times daily, Disp: , Rfl:      furosemide (LASIX) 20 MG tablet, 40 mg daily, Disp: , Rfl:      glipiZIDE (GLUCOTROL XL) 10 MG 24 hr tablet, Take 10 mg by mouth 2 times daily, Disp: , Rfl:      hyoscyamine (LEVSIN) 0.125 MG tablet, Take 0.125 mg by mouth, Disp: , Rfl:      linagliptin (TRADJENTA) 5 MG TABS tablet, 5 mg daily, Disp: , Rfl:      lisinopril (ZESTRIL) 5 MG tablet, 5 mg daily, Disp: , Rfl:      magnesium 250 MG tablet, 2 tablets 2 times daily, Disp: , Rfl:      metoprolol succinate ER (TOPROL-XL) 50 MG 24 hr tablet, 150 mg, Disp: , Rfl:      omeprazole (PRILOSEC) 20 MG DR capsule, TAKE 1 CAPSULE DAILY AS NEEDED 1 HOUR BEFORE A MEAL FOR HEARTBURN. GENERIC FOR PRILOSEC, Disp: , Rfl:      sodium fluoride dental gel (PREVIDENT) 1.1 % GEL topical gel, Brush 2x/day.  Do not eat or drink for 30 minutes after., Disp: , Rfl:      warfarin ANTICOAGULANT (COUMADIN) 2 MG tablet, warfarin 2 mg tablet, Disp: , Rfl:     Review of Systems - 10 point Review of Systems is negative except for right hallux ulcer which is noted in  HPI.      OBJECTIVE:  /66   Pulse 72   Temp 97.7  F (36.5  C)   General appearance: Patient is alert and fully cooperative with history & exam.  No sign of distress is noted during the visit.    Vascular: Dorsalis pedis palpableRight.  Dermatologic:    VASC Wound RIGHT HALLUX (Active)   Post Size Length 0.3 05/20/22 1000   Post Size Width 0.3 05/20/22 1000   Post Size Depth 0.2 05/20/22 1000   Post Total Sq cm 0.09 05/20/22 1000   Description callous 05/20/22 1000   Ulceration plantar medial right hallux has a granular base, no erythema.   Neurologic: Diminished to light touch Right.  Musculoskeletal: Contracted digits noted Right. Mild pain with edema 3rd digit right foot.     Imaging:     US MADHU with PPG wo Exercise Bilateral    Result Date: 4/30/2022  BILATERAL RESTING ANKLE-BRACHIAL INDICES (MADHU'S) (Date: 04/29/22) Indication: Surveillance of Right PFA-PTA/right big toe ulcer  Previous: 2013 R SFA stents for claudication, 6/2016 occluded R SFA stents - relined with Viabahn, 7/2016 re-occlusion of new stents 10/2016 R profund to TP trunk bypass with left cephalic vein; complicated by R groin infection, 4/2017 PTA of proximal and distal bypass 8/2017 Lytics for occluded bypass; 2 stents placed within the bypass itself (prox and at adductor) 1/30/18 RLE bypass revision - cephalic vein from right arm used to bypass from profunda to prior bypass graft 5/29/2018 DCB of prox bypass at site of end-to-end anast History: Previous Smoker, Hypertension, Diabetic, Hyperlipidemia, PAD, Angioplasty, Vascular Surgery and Vascular Ulcers  Resting MADHU's          Right: mmHg Index     Brachial: 149  Ankle-(PT): 213 1.43 Ankle-(DP): 137 0.92          Digit: 83 0.56               Left: mmHg Index     Brachial: 146  Ankle-(PT): >254 NC Ankle-(DP): 110 0.74          Digit: 53 0.36 Resting ankle-brachial index of 1.43 on the right. Toe Pressures of 83 mmHg and TBI of 0.56  Resting ankle-brachial index of NC on the left. Toe  Pressures of 53 mmHg and TBI of 0.36  WAVEFORMS: The right dorsalis pedis and posterior tibial arteries show monophasic waveforms. The left dorsalis pedis and posterior tibial arteries show monophasic waveforms. Impression:  1. RIGHT LOWER EXTREMITY: MADHU is Normal with an MADHU of 1.43. and Normal toe pressures of 83 mmHg. 2. LEFT LOWER EXTREMITY: MADHU is Uninterpretable due to incompressible vessels. and Mildly abnormal, but adequate for healing toe pressures of 53 mmHg. Reference: Wound classification Grade MADHU Ankle Systolic Pressure Toe Pressures 0 > 0.80 > 100 mmHg > 60 mmHg 1 0.6 - 0.79 70 - 100 mmHg 40 - 59 mmHg 2 0.4 - 0.59 50-70 mmHg 30 - 39 mmHg 3 < 0.39 < 50 mmHg < 30 mmHg Digit Pressures DBI Disease Category > 0.70 Normal < 0.70 Abnormal > 30 mmHg Potential wound healing < 30 mmHg Impaired wound healing Ankle Brachial Pressures MADHU Disease Category > 1.3  Likely vessel calcification with monophasic waveforms, non-diagnostic 0.95-1.30 Normal with multiphasic waveforms 0.50-0.95 Single level disease 0.30-0.50 Multilevel disease < 0.30 Critical limb ischema     US Lower Extremity Arterial Duplex Bilateral    Result Date: 4/30/2022  Arterial Duplex Ultrasound (Date: 04/29/22) Lower Extremity Artery Evaluation Indication: Surveillance of Right PFA-PTA/right big toe ulcer  Previous: 2013 R SFA stents for claudication, 6/2016 occluded R SFA stents - relined with Viabahn, 7/2016 re-occlusion of new stents 10/2016 R profund to TP trunk bypass with left cephalic vein; complicated by R groin infection, 4/2017 PTA of proximal and distal bypass 8/2017 Lytics for occluded bypass; 2 stents placed within the bypass itself (prox and at adductor) 1/30/18 RLE bypass revision - cephalic vein from right arm used to bypass from profunda to prior bypass graft 5/29/2018 DCB of prox bypass at site of end-to-end anast History: Previous Smoker, Hypertension, Diabetic, Hyperlipidemia, PAD, Angioplasty, Vascular Surgery and Vascular  Ulcers Technique: Duplex imaging is performed utilizing gray-scale, two-dimensional images, and color-flow imaging. Doppler waveform analysis and spectral Doppler imaging is also performed. LOWER EXTREMITY ARTERIAL DUPLEX EXAM WITH WAVEFORMS Right Leg:(cm/s) Location: Velocities Waveforms EIA:   170  T CFA:   123  T PFA:   148  T PROX GRAFT ANAST:   230  T PROX GRAFT:   79  M MID GRAFT:   48  M DISTAL GRAFT:   53  M DISTAL GRAFT ANAST:   77 M PTA PROX  177 M PTA DISTAL:   66  M DPA:   84  M Waveforms: T=Triphasic, M=Monophasic, B=Biphasic Stenotic Profile Ratio: 230 / 148 = 1.55 Left Leg:(cm/s) Location: Velocities Waveforms EIA:   162  T CFA:   158  M PFA:   212  T SFA Proximal:   86  M SFA Mid:   103/408/182  M/M/M SFA Distal:   98  M Popliteal Artery:   146  M PTA:   39   M DPA:   37  M Waveforms: T=Triphasic, M=Monophasic, B=Biphasic Stenotic Profile Ratio: 408 / 103 = 3.96 Comment: Difficult to visualize right bypass graft due to depth of bypass graft and surgical scaring in right groin. Impression: Right Lower Extremity: Triphasic and biphasic waveforms at the external iliac and common femoral artery level suggest no inflow disease.  Bypass graft appears to be widely patent with no proximal anastomosis of distal anastomosis stenosis and no areas of increased velocities within the graft itself.  That said the waveform in the bypass graft is monophasic and remains monophasic in the outflow vessels.  There may be some infrapopliteal disease in this context. Left Lower Extremity: Triphasic external iliac artery waveform suggest no inflow disease.  Waveforms are monophasic from this level down.  There appears to be some disease in the common femoral artery.  There is clearly a critical stenosis in the mid SFA with velocities consistent with a greater than 50% stenosis.  Beyond this there is additional blunting of the waveforms at the ankle level suggesting additional infrapopliteal tibial disease may be present.  Reference: Category Normal 1-19% 20-49% 50-99% Occluded PSV <160 cm/sec without spectral broadening <160 cm/sec with spectral broadening Increased Increased Absent Flow Ratio N/A N/A < 2.0 >2.0 N/A Post-Stenotic Turbulence No No No Yes N/A          Picture: None

## 2022-05-25 ENCOUNTER — HOSPITAL ENCOUNTER (OUTPATIENT)
Dept: MRI IMAGING | Facility: HOSPITAL | Age: 82
Discharge: HOME | End: 2022-05-25
Attending: PODIATRIST | Admitting: PODIATRIST
Payer: COMMERCIAL

## 2022-05-25 ENCOUNTER — TELEPHONE (OUTPATIENT)
Dept: VASCULAR SURGERY | Facility: CLINIC | Age: 82
End: 2022-05-25
Payer: COMMERCIAL

## 2022-05-25 PROCEDURE — 73718 MRI LOWER EXTREMITY W/O DYE: CPT | Mod: RT

## 2022-05-25 NOTE — TELEPHONE ENCOUNTER
"Pt is asking if she can wear her compression stockings with her \"toe issues\"    She is concerned that she got more wound care supplies than what is needed, she is concerned about the cost.     [phone]  "

## 2022-05-25 NOTE — TELEPHONE ENCOUNTER
Spoke to Taylor and let her know it is best if there isn't any pressure on the toe area like a tight sock. Let her know that she can try to pull the sock out a little to see if she can create a little more room for her toe and see if this helps. Otherwise, if she feels the compression sock is interfering with her toe wound she can try tubular compression that would be open at the toe. Discussed tubular compression and she will let us know if it doesn't work out to loosen her compression sock a little around the toe.     Taylor also wanted to discuss that she received way too many supplies from [employer]. She states she can use one [employer] pad for several dressing changes and she received 15. Let her know that she can either hang on to the extras or call [facility] back and see if they have any suggestions or the possibility of returning any. Provided Linda's contact information.

## 2022-05-26 ENCOUNTER — TELEPHONE (OUTPATIENT)
Dept: VASCULAR SURGERY | Facility: CLINIC | Age: 82
End: 2022-05-26
Payer: COMMERCIAL

## 2022-05-26 NOTE — TELEPHONE ENCOUNTER
I reviewed the right foot MRI with the patient today: 1.  Reticulated edema type signal and enhancement in the fourth and fifth metatarsal heads, new. Differential considerations include nonspecific stress response and osteitis (early osteomyelitis).  2.  Mild dorsal forefoot subcutaneous edema, nonspecific.  3.  Moderate-advanced fourth TMT chondromalacia.  4.  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  5.  Advanced forefoot muscle atrophy.    No history of an open wound along the 4th and 5th rays, osteomyelitis is very unlikely at this location.     I recommend she continue with medihoney on the right hallux as previously discussed. She will follow-up with me in 2 weeks.

## 2022-06-10 ENCOUNTER — OFFICE VISIT (OUTPATIENT)
Dept: VASCULAR SURGERY | Facility: CLINIC | Age: 82
End: 2022-06-10
Attending: PODIATRIST
Payer: COMMERCIAL

## 2022-06-10 VITALS — OXYGEN SATURATION: 93 % | WEIGHT: 221 LBS | HEART RATE: 82 BPM

## 2022-06-10 DIAGNOSIS — L97.511 DIABETIC ULCER OF TOE OF RIGHT FOOT ASSOCIATED WITH TYPE 2 DIABETES MELLITUS, LIMITED TO BREAKDOWN OF SKIN (H): Primary | ICD-10-CM

## 2022-06-10 DIAGNOSIS — E11.621 DIABETIC ULCER OF TOE OF RIGHT FOOT ASSOCIATED WITH TYPE 2 DIABETES MELLITUS, LIMITED TO BREAKDOWN OF SKIN (H): Primary | ICD-10-CM

## 2022-06-10 DIAGNOSIS — S90.121A CONTUSION OF THIRD TOE OF RIGHT FOOT, INITIAL ENCOUNTER: ICD-10-CM

## 2022-06-10 PROCEDURE — G0463 HOSPITAL OUTPT CLINIC VISIT: HCPCS

## 2022-06-10 PROCEDURE — 99212 OFFICE O/P EST SF 10 MIN: CPT | Performed by: PODIATRIST

## 2022-06-10 RX ORDER — CHOLESTYRAMINE 4 G/9G
POWDER, FOR SUSPENSION ORAL
COMMUNITY
Start: 2022-06-03 | End: 2024-02-14

## 2022-06-10 RX ORDER — NITROFURANTOIN 25; 75 MG/1; MG/1
100 CAPSULE ORAL DAILY
COMMUNITY
Start: 2022-06-06

## 2022-06-10 RX ORDER — TROSPIUM CHLORIDE 20 MG/1
TABLET, FILM COATED ORAL
COMMUNITY
Start: 2022-06-02 | End: 2024-02-14

## 2022-06-10 ASSESSMENT — PAIN SCALES - GENERAL: PAINLEVEL: NO PAIN (0)

## 2022-06-10 NOTE — PATIENT INSTRUCTIONS
Congratulations! Your wound has healed.        You may begin showering as normal in 2 weeks    You should avoid soaking your right foot for the next  4 weeks, this includes swimming and hot tubs.    Continue to monitor the area for breakdown & call us if your wound reopens.      We want to hear from you!   In the next few weeks, you should receive a call or email to complete a survey about your visit at [facility]. Please help us improve your appointment experience by letting us know how we did today. We strive to make your experience good and value any ways in which we could do better.      We value your input and suggestions.    Thank you for choosing the [facility]!    Your doctor recommends you use a pumice stone to get rid of your callous. You can purchase a pumice stone at your local drug store.    How to use your Pumice Stone        1. Soak your calloused skin in warm water. The most common part of the body to exfoliate with a pumice stone is the feet. Heels tend to develop a layer of hard, calloused skin that can become cracked or scaled. Your elbows are another area that may benefit from exfoliation. Soak the calloused body part in warm water for about five minutes to soften the skin.  2. Wait until your dry skin has softened. The skin will be easier to remove if it's soft and supple. Feel your skin after several minutes of soaking. If it still feels tough, wait a few more minutes (giving the water a warm-up if necessary). If it's soft, your skin is ready for the pumice stone.   3. Wet the stone. Wetting the stone will help it slide more easily across your skin, rather than catching on it. Run the stone under warm water, or dip it in the water where you're soaking your skin, in order to thoroughly wet it.  4. Rub it gently over the calloused area. Use a circular motion to start sloughing away the dead skin with the pumice stone. If the skin is nice and soft, it should  start coming right off. Keep going until you remove the dead skin and get to the fresh, supple skin underneath.   Don't press too hard. Light pressure is all that is needed; let the surface of the stone do the work.   If you're working on your feet, focus on the heels, the sides of your toes, and other areas where dry skin tends to build up.  5. Rinse and repeat. Rinse off the dead skin and take a look to see if you need to keep going. If you still see bits of dead skin, go over the area again with the pumice stone. Continue using the stone on the area until you're satisfied with the results.   Since the pumice stone will wear down slightly while you use it, you may need to turn it over to get a fresh surface you can use to exfoliate your skin.   Rinse the pumice stone often to keep its surface clean and effective.  6. Dry and moisturize your skin. When you're finished, use a towel to pat your skin dry. Coat the area with an oil or cream to prevent it from drying out too quickly. Your formerly calloused skin should now be soft, supple and gleaming.   Coconut oil, almond oil, or body lotion are all fine to use to condition your skin after pumicing.   Repeat as often as needed to keep your skin in good shape.    CARING FOR YOUR PUMICE STONE:    1. Scrub it after use. Dead skin will build up in the pores of the stone as you use it, so you'll want to clean the stone after use. Use a scrub brush to scrub the stone while holding it under running water. Add a bit of soap to help clean the stone completely. This way your stone will be clean and ready to use next time you need it.   2. Allow it to completely dry out. Set the pumice stone in a dry place so that it doesn't stay damp in between uses. Some pumice stones come with a string attached that allows you to hang the stone to dry. If you let the stone stay wet, bacteria could grow in the pores, making it unsafe to use.   3. Boil it if necessary. Every once in a while,  you'll want to give the stone a deep cleaning to make sure it isn't harboring bacteria. Bring a small pot of water to a full boil, drop in the stone, and boil it for five minutes. Use tongs to remove the stone from the water and allow it to dry completely before storing.   If you use the stone frequently, boil it every two weeks to ensure it stays clean.   If you'd like, you can add a capful of bleach to the water to be certain all the bacteria is killed.  4. Replace the stone when it wears down. Pumice is a soft stone that will eventually wear away after you've used it for awhile. When it gets too small to handle easily, or the surface becomes too smooth to be effective, purchase a new one. Pumice stones are inexpensive and can be found at any store that sells beauty supplies.

## 2022-06-10 NOTE — PROGRESS NOTES
FOOT AND ANKLE SURGERY/PODIATRY Progress Note      ASSESSMENT:   Diabetic Ulceration right hallux   Contusion 3rd digit right foot       TREATMENT:  -The right hallux ulceration has resolved.     -We discussed that due to her anatomy, there is a pressure point which will continue to build callus tissue. If the callus tissue becomes too thick, skin breakdown will likely occur.     -I recommend use of a pumice stone x2-3 per week to remove callus tissue. I have asked that she return for sharp debridement of the callus prn.     -Mild pain 3rd digit right foot. I reviewed the patient's x-rays which are negative for fracture. She will continue to monitor.     -Discussed that generalized foot pain may be related to diabetic neuropathic pain. She will consider use of neurontin.     -She will resume use of diabetic shoes/inserts at this time.     -She is discharged from my care at this time but encouraged to return as concerns develop.     Wily Enciso DPM  [facility] [place]      HPI: Taylor Sanford was seen again today for a right hallux ulcer and contusion 3rd digit right foot. She has remained limited walking in the CAM boot.       Past Medical History:   Diagnosis Date     Benign essential hypertension      CAD (coronary artery disease)      Mixed hyperlipidemia      Paroxysmal atrial fibrillation (H)      Type 2 diabetes mellitus with skin complication, without long-term current use of insulin (H)        No past surgical history on file.    Allergies   Allergen Reactions     Amiodarone      Developed Thyroid toxicity     Sulfonylureas Rash         Current Outpatient Medications:      acarbose (PRECOSE) 25 MG tablet, 25 mg PO twice a day with first bite of breakfast and dinner., Disp: 180 tablet, Rfl: 1     allopurinol (ZYLOPRIM) 100 MG tablet, Take 200 mg by mouth, Disp: , Rfl:      amLODIPine (NORVASC) 5 MG tablet, amlodipine 5 mg tablet, Disp: , Rfl:      aspirin (ASA) 81 MG chewable tablet,  Asprin Ec Low Dose, Disp: , Rfl:      atorvastatin (LIPITOR) 80 MG tablet, atorvastatin 80 mg tablet, Disp: , Rfl:      blood glucose (ACCU-CHEK ADIS PLUS) test strip, USE TO TEST THREE TIMES DAILY, Disp: , Rfl:      buPROPion (WELLBUTRIN) 75 MG tablet, One and a half tablets bid, Disp: , Rfl:      Calcium Citrate (CITRACAL OR), Take 1 tablet by mouth daily, Disp: , Rfl:      cholestyramine (QUESTRAN) 4 g packet, , Disp: , Rfl:      cilostazol (PLETAL) 100 MG tablet, Take 100 mg by mouth 2 times daily, Disp: , Rfl:      furosemide (LASIX) 20 MG tablet, 40 mg daily, Disp: , Rfl:      glipiZIDE (GLUCOTROL XL) 10 MG 24 hr tablet, Take 10 mg by mouth 2 times daily, Disp: , Rfl:      hyoscyamine (LEVSIN) 0.125 MG tablet, Take 0.125 mg by mouth, Disp: , Rfl:      linagliptin (TRADJENTA) 5 MG TABS tablet, 5 mg daily, Disp: , Rfl:      lisinopril (ZESTRIL) 5 MG tablet, 5 mg daily, Disp: , Rfl:      magnesium 250 MG tablet, 2 tablets 2 times daily, Disp: , Rfl:      metoprolol succinate ER (TOPROL-XL) 50 MG 24 hr tablet, 150 mg, Disp: , Rfl:      nitroFURantoin macrocrystal-monohydrate (MACROBID) 100 MG capsule, , Disp: , Rfl:      omeprazole (PRILOSEC) 20 MG DR capsule, TAKE 1 CAPSULE DAILY AS NEEDED 1 HOUR BEFORE A MEAL FOR HEARTBURN. GENERIC FOR PRILOSEC, Disp: , Rfl:      sodium fluoride dental gel (PREVIDENT) 1.1 % GEL topical gel, Brush 2x/day.  Do not eat or drink for 30 minutes after., Disp: , Rfl:      trospium (SANCTURA) 20 MG tablet, , Disp: , Rfl:      warfarin ANTICOAGULANT (COUMADIN) 2 MG tablet, warfarin 2 mg tablet, Disp: , Rfl:     Review of Systems - 10 point Review of Systems is negative except for right hallux ulcer which is noted in HPI.      OBJECTIVE:  Pulse 82   Wt 221 lb (100.2 kg)   SpO2 93%   General appearance: Patient is alert and fully cooperative with history & exam.  No sign of distress is noted during the visit.    Vascular: Dorsalis pedis non-palpableRight.  Dermatologic:    VASC Wound  RIGHT HALLUX (Active)   Post Size Length 0 06/10/22 1000   Post Size Width 0 06/10/22 1000   Post Size Depth 0 06/10/22 1000   Post Total Sq cm 0 06/10/22 1000   Description callous 06/10/22 1000     Neurologic: Diminished to light touch Right.  Musculoskeletal: Contracted digits noted Right. Minimal discomfort 3rd digit right foot.     Imaging:     MR Foot Right w/o Contrast    Result Date: 5/26/2022  EXAM: MR FOOT RIGHT W/O CONTRAST LOCATION: [facility] DATE/TIME: 5/25/2022 5:58 PM INDICATION: 81-year-old patient with a right foot ulcer and infection. COMPARISON: 1/19/2022 MRI. 5/20/2022 radiographs. TECHNIQUE: Unenhanced. FINDINGS: JOINTS AND BONES: -There is reticulated edema type signal and enhancement in the fourth and fifth metatarsal heads. No definitive confluent decreased T1 signal in this area. 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 -Moderate-advanced fourth TMT chondromalacia. Cartilage thinning in the superior and mid joint space and subchondral edema in the fourth metatarsal base. -Small intraosseous cyst in the fourth metatarsal proximal metaphysis. TENDONS: -No tendon tear, tendinopathy, or tenosynovitis. LIGAMENTS: -Lisfranc ligament: Intact. No subluxation. MUSCLES AND SOFT TISSUES: -Advanced forefoot muscle fatty atrophy. -Mild subcutaneous edema in the dorsal forefoot. -Small ganglion cyst at the dorsal margin of the third metatarsal base, unchanged.     IMPRESSION: 1.  Reticulated edema type signal and enhancement in the fourth and fifth metatarsal heads, new. Differential considerations include nonspecific stress response and osteitis (early osteomyelitis). 2.  Mild dorsal forefoot subcutaneous edema, nonspecific. 3.  Moderate-advanced fourth TMT chondromalacia. 4.  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 5.  Advanced forefoot muscle atrophy.    XR Foot 3 Views Standing  Right    Result Date: 5/20/2022  No fracture, dislocation noted. Contracted digits. Flattening of the 2nd metatarsal head.          Picture:

## 2022-07-08 ENCOUNTER — LAB (OUTPATIENT)
Dept: LAB | Facility: CLINIC | Age: 82
End: 2022-07-08
Payer: COMMERCIAL

## 2022-07-08 DIAGNOSIS — E11.621 DIABETIC ULCER OF TOE OF RIGHT FOOT ASSOCIATED WITH TYPE 2 DIABETES MELLITUS, LIMITED TO BREAKDOWN OF SKIN (H): ICD-10-CM

## 2022-07-08 DIAGNOSIS — L97.511 DIABETIC ULCER OF TOE OF RIGHT FOOT ASSOCIATED WITH TYPE 2 DIABETES MELLITUS, LIMITED TO BREAKDOWN OF SKIN (H): ICD-10-CM

## 2022-07-08 LAB
ALBUMIN SERPL BCG-MCNC: 4.2 G/DL (ref 3.5–5.2)
ALP SERPL-CCNC: 138 U/L (ref 35–104)
ALT SERPL W P-5'-P-CCNC: 14 U/L (ref 10–35)
ANION GAP SERPL CALCULATED.3IONS-SCNC: 10 MMOL/L (ref 7–15)
AST SERPL W P-5'-P-CCNC: 21 U/L (ref 10–35)
BILIRUB SERPL-MCNC: 0.6 MG/DL
BUN SERPL-MCNC: 29 MG/DL (ref 8–23)
CALCIUM SERPL-MCNC: 9.2 MG/DL (ref 8.8–10.2)
CHLORIDE SERPL-SCNC: 105 MMOL/L (ref 98–107)
CREAT SERPL-MCNC: 1.5 MG/DL (ref 0.51–0.95)
CREAT UR-MCNC: 209 MG/DL
DEPRECATED HCO3 PLAS-SCNC: 24 MMOL/L (ref 22–29)
GFR SERPL CREATININE-BSD FRML MDRD: 35 ML/MIN/1.73M2
GLUCOSE SERPL-MCNC: 163 MG/DL (ref 70–99)
HBA1C MFR BLD: 8.1 % (ref 0–5.6)
MICROALBUMIN UR-MCNC: 15 MG/L
MICROALBUMIN/CREAT UR: 7.18 MG/G CR (ref 0–25)
POTASSIUM SERPL-SCNC: 5.3 MMOL/L (ref 3.4–5.3)
PROT SERPL-MCNC: 6.5 G/DL (ref 6.4–8.3)
SODIUM SERPL-SCNC: 139 MMOL/L (ref 136–145)
TSH SERPL DL<=0.005 MIU/L-ACNC: 1.82 UIU/ML (ref 0.3–4.2)

## 2022-07-08 PROCEDURE — 83036 HEMOGLOBIN GLYCOSYLATED A1C: CPT

## 2022-07-08 PROCEDURE — 82043 UR ALBUMIN QUANTITATIVE: CPT

## 2022-07-08 PROCEDURE — 36415 COLL VENOUS BLD VENIPUNCTURE: CPT

## 2022-07-08 PROCEDURE — 84443 ASSAY THYROID STIM HORMONE: CPT

## 2022-07-08 PROCEDURE — 80053 COMPREHEN METABOLIC PANEL: CPT

## 2022-07-15 ENCOUNTER — OFFICE VISIT (OUTPATIENT)
Dept: ENDOCRINOLOGY | Facility: CLINIC | Age: 82
End: 2022-07-15
Payer: COMMERCIAL

## 2022-07-15 VITALS — DIASTOLIC BLOOD PRESSURE: 52 MMHG | SYSTOLIC BLOOD PRESSURE: 118 MMHG | WEIGHT: 217.1 LBS | HEART RATE: 88 BPM

## 2022-07-15 DIAGNOSIS — L97.511 DIABETIC ULCER OF TOE OF RIGHT FOOT ASSOCIATED WITH TYPE 2 DIABETES MELLITUS, LIMITED TO BREAKDOWN OF SKIN (H): Primary | ICD-10-CM

## 2022-07-15 DIAGNOSIS — E11.621 DIABETIC ULCER OF TOE OF RIGHT FOOT ASSOCIATED WITH TYPE 2 DIABETES MELLITUS, LIMITED TO BREAKDOWN OF SKIN (H): Primary | ICD-10-CM

## 2022-07-15 PROCEDURE — 99215 OFFICE O/P EST HI 40 MIN: CPT | Performed by: INTERNAL MEDICINE

## 2022-07-15 NOTE — PATIENT INSTRUCTIONS
-Continue Acarbose 25 mg with first bite of breakfast and dinner  -Continue Tradjenta and glipizide without changes  -Keep appointment with diabetes education--continue to work on dietary changes to minimize refined sugars  -Check blood glucose once daily but at alternating times, first thing in the morning on one day and before dinner on another; also check with any unusual symptoms that could be due to low blood glucose  -Address osteoporosis with primary care doctor--we are happy to consult if further questions  -Return for a follow-up visit in 3 months, with labs before visit  -We will communicate results by letter, or if needed by phone

## 2022-07-15 NOTE — LETTER
7/15/2022         RE: Taylor Sanford  [address] [facility] [address]        Dear Colleague,    Thank you for referring your patient, Taylor Sanford, to the [facility]. Please see a copy of my visit note below.      ENDOCRINOLOGY FOLLOW-UP         HISTORY OF PRESENT ILLNESS    Taylor Sanford is seen in follow-up for the issues outlined below.     1.  Diabetes mellitus.  In the interim since initial visit with me in 5/2022, she saw Dr. Enciso twice and right hallux ulceration had resolved.  She was discharged from care, with follow-up as needed.    We started acarbose at her last visit.  Having some difficulty remembering to take it with her first bite of food.  However, when she does take it (which is about half of the time) she is tolerating without any gastrointestinal side effects.    She has reduced frequency of snacking on foods with refined sugars.    Current diabetes regimen: Tradjenta 5 mg daily, glipizide XL 10 mg twice daily, acarbose 25 mg twice daily, with first bite of food.    She has had difficulty checking blood glucose daily at alternating times as we discussed last visit.  Mostly checking first thing in the morning, with glucose values mostly in the 90s to 130s, average glucose 136 over the past 2 weeks.  No hypoglycemia captured on fingerstick glucose values.  No symptoms suggestive of hypoglycemia such as shakiness, sweatiness, or hunger.    2.  Osteoporosis. DXA scan performed in the [facility] system in 2020 showed osteoporosis.  Patient has history of right hip fracture 2 years ago after falling from standing height.    In the past week, she has had worsening right hip pain after pulling weeds.  No falls preceding onset of pain.    She has no other fracture history.    Does not take calcium supplement.  We reviewed her diet: Has a cup of either dairy milk or milk alternate each day.  Otherwise, no daily intake of other dairy products or  other calcium rich foods such as dark leafy greens.    Does not take vitamin D supplement.    History of recurrent kidney stones, last episode of renal colic 20 years ago.  No history of long-term glucocorticoid therapy.  No history of premature menopause.    No family history of osteoporosis.  No parental history of hip fracture.    Pertinent endocrine and related history:  1.  Diabetes mellitus, type 2.  Diagnosed approximately in the late 1990s.  -Metformin had to be discontinued due to change in kidney function.  2. History of amiodarone-induced thyroiditis. 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 Thyroid ultrasound 2011 showed heterogenous thyroid parenchyma: two subcentimeter thyroid nodules were noted, one in left and another in right lobe.  -Most recent thyroid US 9/24/2013 showed the following:  Right lobe spongiform 0.4 x 0.3  x 0.4 cm nodule in the midpole, previously measuring 0.3 x 0.2 cm.   Left lobe isoechoic nodule in the lower pole measuring 0.6 x 0.5 x 0.5 cm unchanged with little vascularity.   3. Chronic kidney disease.  4.  Atrial fibrillation.  5.  Coronary artery disease, status post PCI in 2002.  6.  Hyperlipidemia.  7.  Hypertension.  8.  Depression.    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  Had 2 children, a son and a daughter; her daughter passed away.    PAST MEDICAL HISTORY  Past Medical History:   Diagnosis Date     Benign essential hypertension      CAD (coronary artery disease)      Mixed hyperlipidemia      Paroxysmal atrial fibrillation (H)      Type 2 diabetes mellitus with skin complication, without long-term current use of insulin (H)        MEDICATIONS  Current Outpatient Medications   Medication Sig Dispense Refill     acarbose (PRECOSE) 25 MG  tablet 25 mg PO twice a day with first bite of breakfast and dinner. 180 tablet 1     blood glucose (ACCU-CHEK ADIS PLUS) test strip 1 strip by In Vitro route daily       glipiZIDE (GLUCOTROL XL) 10 MG 24 hr tablet Take 10 mg by mouth 2 times daily       linagliptin (TRADJENTA) 5 MG TABS tablet 5 mg daily       allopurinol (ZYLOPRIM) 100 MG tablet Take 200 mg by mouth       amLODIPine (NORVASC) 5 MG tablet amlodipine 5 mg tablet       aspirin (ASA) 81 MG chewable tablet Asprin Ec Low Dose       atorvastatin (LIPITOR) 80 MG tablet atorvastatin 80 mg tablet       buPROPion (WELLBUTRIN) 75 MG tablet One and a half tablets bid       Calcium Citrate (CITRACAL OR) Take 1 tablet by mouth daily       cholestyramine (QUESTRAN) 4 g packet        cilostazol (PLETAL) 100 MG tablet Take 100 mg by mouth 2 times daily       furosemide (LASIX) 20 MG tablet 40 mg daily       hyoscyamine (LEVSIN) 0.125 MG tablet Take 0.125 mg by mouth       lisinopril (ZESTRIL) 5 MG tablet 5 mg daily       magnesium 250 MG tablet 2 tablets 2 times daily       metoprolol succinate ER (TOPROL-XL) 50 MG 24 hr tablet 150 mg       nitroFURantoin macrocrystal-monohydrate (MACROBID) 100 MG capsule        omeprazole (PRILOSEC) 20 MG DR capsule TAKE 1 CAPSULE DAILY AS NEEDED 1 HOUR BEFORE A MEAL FOR HEARTBURN. GENERIC FOR PRILOSEC       sodium fluoride dental gel (PREVIDENT) 1.1 % GEL topical gel Brush 2x/day.  Do not eat or drink for 30 minutes after.       trospium (SANCTURA) 20 MG tablet        warfarin ANTICOAGULANT (COUMADIN) 2 MG tablet warfarin 2 mg tablet         Allergies, family, and social history were reviewed and documented as needed in EHR.     REVIEW OF SYSTEMS  A focused ROS was performed, with pertinent positives and negatives as noted in the HPI.    PHYSICAL EXAM  /52   Pulse 88   Wt 98.5 kg (217 lb 1.6 oz)   There is no height or weight on file to calculate BMI.  Constitutional: Vital signs reviewed, as recorded above. Patient is  alert, oriented and appears in no acute distress.  MSK: No clubbing or cyanosis; normal muscle bulk and tone. Using walker.  Neurological: Alert and oriented times 3.    DATA REVIEW  Each of the following laboratory and/or imaging studies were reviewed.    Lab on 07/08/2022   Component Date Value Ref Range Status     Hemoglobin A1C 07/08/2022 8.1 (A) 0.0 - 5.6 % Final    Normal <5.7%   Prediabetes 5.7-6.4%    Diabetes 6.5% or higher     Note: Adopted from ADA consensus guidelines.     Sodium 07/08/2022 139  136 - 145 mmol/L Final     Potassium 07/08/2022 5.3  3.4 - 5.3 mmol/L Final     Creatinine 07/08/2022 1.50 (A) 0.51 - 0.95 mg/dL Final     Urea Nitrogen 07/08/2022 29.0 (A) 8.0 - 23.0 mg/dL Final     Chloride 07/08/2022 105  98 - 107 mmol/L Final     Carbon Dioxide (CO2) 07/08/2022 24  22 - 29 mmol/L Final     Anion Gap 07/08/2022 10  7 - 15 mmol/L Final     Glucose 07/08/2022 163 (A) 70 - 99 mg/dL Final     Calcium 07/08/2022 9.2  8.8 - 10.2 mg/dL Final     Protein Total 07/08/2022 6.5  6.4 - 8.3 g/dL Final     Albumin 07/08/2022 4.2  3.5 - 5.2 g/dL Final     Bilirubin Total 07/08/2022 0.6  <=1.2 mg/dL Final     Alkaline Phosphatase 07/08/2022 138 (A) 35 - 104 U/L Final     AST 07/08/2022 21  10 - 35 U/L Final     ALT 07/08/2022 14  10 - 35 U/L Final     GFR Estimate 07/08/2022 35 (A) >60 mL/min/1.73m2 Final    Effective December 21, 2021 eGFRcr in adults is calculated using the 2021 CKD-EPI creatinine equation which includes age and gender (Giuliana mendoza al., NEJM, DOI: 10.1056/YHHAcc7103309)     TSH 07/08/2022 1.82  0.30 - 4.20 uIU/mL Final     Albumin Urine mg/L 07/08/2022 15.0  mg/L Final    The reference ranges have not been established in urine albumin. The results should be integrated into the clinical context for interpretation.     Albumin Urine mg/g Cr 07/08/2022 7.18  0.00 - 25.00 mg/g Cr Final    Microalbuminuria is defined as an albumin:creatinine ratio of 17 to 299 for males and 25 to 299 for females. A  ratio of albumin:creatinine of 300 or higher is indicative of overt proteinuria.  Due to biologic variability, positive results should be confirmed by a second, first-morning random or 24-hour timed urine specimen. If there is discrepancy, a third specimen is recommended. When 2 out of 3 results are in the microalbuminuria range, this is evidence for incipient nephropathy and warrants increased efforts at glucose control, blood pressure control, and institution of therapy with an angiotensin-converting-enzyme (ACE) inhibitor (if the patient can tolerate it).       Creatinine Urine mg/dL 07/08/2022 209.0  mg/dL Final    The reference ranges have not been established in urine creatinine. The results should be integrated into the clinical context for interpretation.         ASSESSMENT  1.  Diabetes mellitus, type 2.  Improving glycemia based on fingerstick glucose data and previsit hemoglobin A1c.  Able to take acarbose about 50% of the time and tolerating without side effect.  Therefore, rather than increased dose, patient will work on taking acarbose as consistently as possible.  As noted before, we did discuss the option of starting GLP-1 receptor agonist given her coexisting coronary artery disease and chronic kidney disease (both of which would benefit from GLP-1 receptor agonist therapy)--she has preferred to defer injectable medications.  Has done well with making dietary changes.  We will plan on visit with diabetes care and  as scheduled.    2.  Diabetes preventive care.  -No known diabetic retinopathy, has annual eye exam at [facility]; my review of Care Everywhere records indicated no DR on eye exam in 7/2021 (upcoming appointment scheduled in 8/2022).  -CKD, with decline in kidney function; urine microalbumin screen checked prior to this visit is normal, unclear if CKD may be related to diabetes, hypertension or other cause.  Discussed referral to nephrology but the patient would  prefer to defer this for now.  Recheck renal function prior to next visit.  Would also recommend following in primary care.  -Has peripheral neuropathic symptoms, has been treated by Dr. Enciso for right hallux ulceration.    3.  Osteoporosis.  Based on DXA from 2020 and history of low trauma fracture.  Would recommend addressing this to reduce risk of fracture in the future.  The patient would like to discuss this with PCP.  Recommend following up with PCP to address sufficient calcium and vitamin D intake, necessary work-up for osteoporosis as well as initiation of treatment.  I am happy to consult on this if questions come up but will defer to PCP given patient's preferences.    4.  Hypertension.  Blood pressure controlled.    5.  Hyperlipidemia.  On statin therapy.    6.  Coronary artery disease and paroxysmal atrial fibrillation.  Following in cardiology.    7. History of hyperthyroidism. Clinically appears euthyroid.  Thyroid function tests checked prior to this visit are normal.    8. History of thyroid nodules. Subcentimeter and stable based on last US report from 2013. Consider follow-up US in the future, once more acute issues have stabilized.    9.  Right hip pain.  No trauma preceding onset.  Is planning to be evaluated in same-day care this afternoon.    PLAN  -Continue Acarbose 25 mg with first bite of breakfast and dinner  -Continue Tradjenta and glipizide without changes  -Keep appointment with diabetes education--continue to work on dietary changes to minimize refined sugars  -Check blood glucose once daily but at alternating times, first thing in the morning on one day and before dinner on another; also check with any unusual symptoms that could be due to low blood glucose  -Address osteoporosis with primary care doctor--we are happy to consult if further questions  -Return for a follow-up visit in 3 months, with labs before visit  -We will communicate results by letter, or if needed by  phone       Orders Placed This Encounter   Procedures     Hemoglobin A1c     Comprehensive metabolic panel       I spent a total of 41 minutes on the date of encounter reviewing medical records, evaluating the patient, coordinating care and documenting in the EHR, as detailed above.      Adriano Sampson MD   Division of Diabetes, Endocrinology and Metabolism  Department of Medicine      cc: Pelon Drew MD      Again, thank you for allowing me to participate in the care of your patient.        Sincerely,        ADRIANO Sampson MD

## 2022-07-15 NOTE — PROGRESS NOTES
ENDOCRINOLOGY FOLLOW-UP         HISTORY OF PRESENT ILLNESS    Taylor Sanford is seen in follow-up for the issues outlined below.     1.  Diabetes mellitus.  In the interim since initial visit with me in 5/2022, she saw Dr. Enciso twice and right hallux ulceration had resolved.  She was discharged from care, with follow-up as needed.    We started acarbose at her last visit.  Having some difficulty remembering to take it with her first bite of food.  However, when she does take it (which is about half of the time) she is tolerating without any gastrointestinal side effects.    She has reduced frequency of snacking on foods with refined sugars.    Current diabetes regimen: Tradjenta 5 mg daily, glipizide XL 10 mg twice daily, acarbose 25 mg twice daily, with first bite of food.    She has had difficulty checking blood glucose daily at alternating times as we discussed last visit.  Mostly checking first thing in the morning, with glucose values mostly in the 90s to 130s, average glucose 136 over the past 2 weeks.  No hypoglycemia captured on fingerstick glucose values.  No symptoms suggestive of hypoglycemia such as shakiness, sweatiness, or hunger.    2.  Osteoporosis. DXA scan performed in the [facility] system in 2020 showed osteoporosis.  Patient has history of right hip fracture 2 years ago after falling from standing height.    In the past week, she has had worsening right hip pain after pulling weeds.  No falls preceding onset of pain.    She has no other fracture history.    Does not take calcium supplement.  We reviewed her diet: Has a cup of either dairy milk or milk alternate each day.  Otherwise, no daily intake of other dairy products or other calcium rich foods such as dark leafy greens.    Does not take vitamin D supplement.    History of recurrent kidney stones, last episode of renal colic 20 years ago.  No history of long-term glucocorticoid therapy.  No history of premature menopause.    No  family history of osteoporosis.  No parental history of hip fracture.    Pertinent endocrine and related history:  1.  Diabetes mellitus, type 2.  Diagnosed approximately in the late 1990s.  -Metformin had to be discontinued due to change in kidney function.  2. History of amiodarone-induced thyroiditis. 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 Thyroid ultrasound 2011 showed heterogenous thyroid parenchyma: two subcentimeter thyroid nodules were noted, one in left and another in right lobe.  -Most recent thyroid US 9/24/2013 showed the following:  Right lobe spongiform 0.4 x 0.3  x 0.4 cm nodule in the midpole, previously measuring 0.3 x 0.2 cm.   Left lobe isoechoic nodule in the lower pole measuring 0.6 x 0.5 x 0.5 cm unchanged with little vascularity.   3. Chronic kidney disease.  4.  Atrial fibrillation.  5.  Coronary artery disease, status post PCI in 2002.  6.  Hyperlipidemia.  7.  Hypertension.  8.  Depression.    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  Had 2 children, a son and a daughter; her daughter passed away.    PAST MEDICAL HISTORY  Past Medical History:   Diagnosis Date     Benign essential hypertension      CAD (coronary artery disease)      Mixed hyperlipidemia      Paroxysmal atrial fibrillation (H)      Type 2 diabetes mellitus with skin complication, without long-term current use of insulin (H)        MEDICATIONS  Current Outpatient Medications   Medication Sig Dispense Refill     acarbose (PRECOSE) 25 MG tablet 25 mg PO twice a day with first bite of breakfast and dinner. 180 tablet 1     blood glucose (ACCU-CHEK ADIS PLUS) test strip 1 strip by In Vitro route daily       glipiZIDE (GLUCOTROL XL) 10 MG 24 hr tablet Take 10 mg by mouth 2 times daily       linagliptin  (TRADJENTA) 5 MG TABS tablet 5 mg daily       allopurinol (ZYLOPRIM) 100 MG tablet Take 200 mg by mouth       amLODIPine (NORVASC) 5 MG tablet amlodipine 5 mg tablet       aspirin (ASA) 81 MG chewable tablet Asprin Ec Low Dose       atorvastatin (LIPITOR) 80 MG tablet atorvastatin 80 mg tablet       buPROPion (WELLBUTRIN) 75 MG tablet One and a half tablets bid       Calcium Citrate (CITRACAL OR) Take 1 tablet by mouth daily       cholestyramine (QUESTRAN) 4 g packet        cilostazol (PLETAL) 100 MG tablet Take 100 mg by mouth 2 times daily       furosemide (LASIX) 20 MG tablet 40 mg daily       hyoscyamine (LEVSIN) 0.125 MG tablet Take 0.125 mg by mouth       lisinopril (ZESTRIL) 5 MG tablet 5 mg daily       magnesium 250 MG tablet 2 tablets 2 times daily       metoprolol succinate ER (TOPROL-XL) 50 MG 24 hr tablet 150 mg       nitroFURantoin macrocrystal-monohydrate (MACROBID) 100 MG capsule        omeprazole (PRILOSEC) 20 MG DR capsule TAKE 1 CAPSULE DAILY AS NEEDED 1 HOUR BEFORE A MEAL FOR HEARTBURN. GENERIC FOR PRILOSEC       sodium fluoride dental gel (PREVIDENT) 1.1 % GEL topical gel Brush 2x/day.  Do not eat or drink for 30 minutes after.       trospium (SANCTURA) 20 MG tablet        warfarin ANTICOAGULANT (COUMADIN) 2 MG tablet warfarin 2 mg tablet         Allergies, family, and social history were reviewed and documented as needed in EHR.     REVIEW OF SYSTEMS  A focused ROS was performed, with pertinent positives and negatives as noted in the HPI.    PHYSICAL EXAM  /52   Pulse 88   Wt 98.5 kg (217 lb 1.6 oz)   There is no height or weight on file to calculate BMI.  Constitutional: Vital signs reviewed, as recorded above. Patient is alert, oriented and appears in no acute distress.  MSK: No clubbing or cyanosis; normal muscle bulk and tone. Using walker.  Neurological: Alert and oriented times 3.    DATA REVIEW  Each of the following laboratory and/or imaging studies were reviewed.    Lab on  07/08/2022   Component Date Value Ref Range Status     Hemoglobin A1C 07/08/2022 8.1 (A) 0.0 - 5.6 % Final    Normal <5.7%   Prediabetes 5.7-6.4%    Diabetes 6.5% or higher     Note: Adopted from ADA consensus guidelines.     Sodium 07/08/2022 139  136 - 145 mmol/L Final     Potassium 07/08/2022 5.3  3.4 - 5.3 mmol/L Final     Creatinine 07/08/2022 1.50 (A) 0.51 - 0.95 mg/dL Final     Urea Nitrogen 07/08/2022 29.0 (A) 8.0 - 23.0 mg/dL Final     Chloride 07/08/2022 105  98 - 107 mmol/L Final     Carbon Dioxide (CO2) 07/08/2022 24  22 - 29 mmol/L Final     Anion Gap 07/08/2022 10  7 - 15 mmol/L Final     Glucose 07/08/2022 163 (A) 70 - 99 mg/dL Final     Calcium 07/08/2022 9.2  8.8 - 10.2 mg/dL Final     Protein Total 07/08/2022 6.5  6.4 - 8.3 g/dL Final     Albumin 07/08/2022 4.2  3.5 - 5.2 g/dL Final     Bilirubin Total 07/08/2022 0.6  <=1.2 mg/dL Final     Alkaline Phosphatase 07/08/2022 138 (A) 35 - 104 U/L Final     AST 07/08/2022 21  10 - 35 U/L Final     ALT 07/08/2022 14  10 - 35 U/L Final     GFR Estimate 07/08/2022 35 (A) >60 mL/min/1.73m2 Final    Effective December 21, 2021 eGFRcr in adults is calculated using the 2021 CKD-EPI creatinine equation which includes age and gender (Giuliana et al., NEJM, DOI: 10.1056/DQMEcl6712760)     TSH 07/08/2022 1.82  0.30 - 4.20 uIU/mL Final     Albumin Urine mg/L 07/08/2022 15.0  mg/L Final    The reference ranges have not been established in urine albumin. The results should be integrated into the clinical context for interpretation.     Albumin Urine mg/g Cr 07/08/2022 7.18  0.00 - 25.00 mg/g Cr Final    Microalbuminuria is defined as an albumin:creatinine ratio of 17 to 299 for males and 25 to 299 for females. A ratio of albumin:creatinine of 300 or higher is indicative of overt proteinuria.  Due to biologic variability, positive results should be confirmed by a second, first-morning random or 24-hour timed urine specimen. If there is discrepancy, a third specimen is  recommended. When 2 out of 3 results are in the microalbuminuria range, this is evidence for incipient nephropathy and warrants increased efforts at glucose control, blood pressure control, and institution of therapy with an angiotensin-converting-enzyme (ACE) inhibitor (if the patient can tolerate it).       Creatinine Urine mg/dL 07/08/2022 209.0  mg/dL Final    The reference ranges have not been established in urine creatinine. The results should be integrated into the clinical context for interpretation.         ASSESSMENT  1.  Diabetes mellitus, type 2.  Improving glycemia based on fingerstick glucose data and previsit hemoglobin A1c.  Able to take acarbose about 50% of the time and tolerating without side effect.  Therefore, rather than increased dose, patient will work on taking acarbose as consistently as possible.  As noted before, we did discuss the option of starting GLP-1 receptor agonist given her coexisting coronary artery disease and chronic kidney disease (both of which would benefit from GLP-1 receptor agonist therapy)--she has preferred to defer injectable medications.  Has done well with making dietary changes.  We will plan on visit with diabetes care and  as scheduled.    2.  Diabetes preventive care.  -No known diabetic retinopathy, has annual eye exam at [facility]; my review of Care Everywhere records indicated no DR on eye exam in 7/2021 (upcoming appointment scheduled in 8/2022).  -CKD, with decline in kidney function; urine microalbumin screen checked prior to this visit is normal, unclear if CKD may be related to diabetes, hypertension or other cause.  Discussed referral to nephrology but the patient would prefer to defer this for now.  Recheck renal function prior to next visit.  Would also recommend following in primary care.  -Has peripheral neuropathic symptoms, has been treated by Dr. Enciso for right hallux ulceration.    3.  Osteoporosis.  Based on DXA from  2020 and history of low trauma fracture.  Would recommend addressing this to reduce risk of fracture in the future.  The patient would like to discuss this with PCP.  Recommend following up with PCP to address sufficient calcium and vitamin D intake, necessary work-up for osteoporosis as well as initiation of treatment.  I am happy to consult on this if questions come up but will defer to PCP given patient's preferences.    4.  Hypertension.  Blood pressure controlled.    5.  Hyperlipidemia.  On statin therapy.    6.  Coronary artery disease and paroxysmal atrial fibrillation.  Following in cardiology.    7. History of hyperthyroidism. Clinically appears euthyroid.  Thyroid function tests checked prior to this visit are normal.    8. History of thyroid nodules. Subcentimeter and stable based on last US report from 2013. Consider follow-up US in the future, once more acute issues have stabilized.    9.  Right hip pain.  No trauma preceding onset.  Is planning to be evaluated in same-day care this afternoon.    PLAN  -Continue Acarbose 25 mg with first bite of breakfast and dinner  -Continue Tradjenta and glipizide without changes  -Keep appointment with diabetes education--continue to work on dietary changes to minimize refined sugars  -Check blood glucose once daily but at alternating times, first thing in the morning on one day and before dinner on another; also check with any unusual symptoms that could be due to low blood glucose  -Address osteoporosis with primary care doctor--we are happy to consult if further questions  -Return for a follow-up visit in 3 months, with labs before visit  -We will communicate results by letter, or if needed by phone       Orders Placed This Encounter   Procedures     Hemoglobin A1c     Comprehensive metabolic panel       I spent a total of 41 minutes on the date of encounter reviewing medical records, evaluating the patient, coordinating care and documenting in the EHR, as  detailed above.      Clarissa Sampson MD   Division of Diabetes, Endocrinology and Metabolism  Department of Medicine      cc: Pelon Drew MD

## 2022-08-05 ENCOUNTER — TRANSFERRED RECORDS (OUTPATIENT)
Dept: HEALTH INFORMATION MANAGEMENT | Facility: CLINIC | Age: 82
End: 2022-08-05

## 2022-08-05 LAB — INR (EXTERNAL): 1.9 (ref 0.9–1.1)

## 2022-08-12 ENCOUNTER — LAB REQUISITION (OUTPATIENT)
Dept: LAB | Facility: CLINIC | Age: 82
End: 2022-08-12
Payer: COMMERCIAL

## 2022-08-12 DIAGNOSIS — D62 ACUTE POSTHEMORRHAGIC ANEMIA: ICD-10-CM

## 2022-08-15 LAB
ERYTHROCYTE [DISTWIDTH] IN BLOOD BY AUTOMATED COUNT: 22.2 % (ref 10–15)
HCT VFR BLD AUTO: 32.1 % (ref 35–47)
HGB BLD-MCNC: 9 G/DL (ref 11.7–15.7)
MCH RBC QN AUTO: 22.5 PG (ref 26.5–33)
MCHC RBC AUTO-ENTMCNC: 28 G/DL (ref 31.5–36.5)
MCV RBC AUTO: 80 FL (ref 78–100)
PLATELET # BLD AUTO: 470 10E3/UL (ref 150–450)
RBC # BLD AUTO: 4 10E6/UL (ref 3.8–5.2)
WBC # BLD AUTO: 8.1 10E3/UL (ref 4–11)

## 2022-08-15 PROCEDURE — P9604 ONE-WAY ALLOW PRORATED TRIP: HCPCS | Mod: ORL | Performed by: NURSE PRACTITIONER

## 2022-08-15 PROCEDURE — 36415 COLL VENOUS BLD VENIPUNCTURE: CPT | Mod: ORL | Performed by: NURSE PRACTITIONER

## 2022-08-15 PROCEDURE — 85027 COMPLETE CBC AUTOMATED: CPT | Mod: ORL | Performed by: NURSE PRACTITIONER

## 2022-08-17 ENCOUNTER — LAB REQUISITION (OUTPATIENT)
Dept: LAB | Facility: CLINIC | Age: 82
End: 2022-08-17
Payer: COMMERCIAL

## 2022-08-17 DIAGNOSIS — S32.121D: ICD-10-CM

## 2022-08-17 DIAGNOSIS — S32.020S WEDGE COMPRESSION FRACTURE OF SECOND LUMBAR VERTEBRA, SEQUELA: ICD-10-CM

## 2022-08-17 DIAGNOSIS — S32.040S WEDGE COMPRESSION FRACTURE OF FOURTH LUMBAR VERTEBRA, SEQUELA: ICD-10-CM

## 2022-08-19 LAB
BASOPHILS # BLD AUTO: 0.1 10E3/UL (ref 0–0.2)
BASOPHILS NFR BLD AUTO: 1 %
EOSINOPHIL # BLD AUTO: 0.2 10E3/UL (ref 0–0.7)
EOSINOPHIL NFR BLD AUTO: 3 %
ERYTHROCYTE [DISTWIDTH] IN BLOOD BY AUTOMATED COUNT: 22.5 % (ref 10–15)
HCT VFR BLD AUTO: 32.8 % (ref 35–47)
HGB BLD-MCNC: 9.4 G/DL (ref 11.7–15.7)
IMM GRANULOCYTES # BLD: 0.1 10E3/UL
IMM GRANULOCYTES NFR BLD: 1 %
INR PPP: 1.6 (ref 0.85–1.15)
LYMPHOCYTES # BLD AUTO: 1.2 10E3/UL (ref 0.8–5.3)
LYMPHOCYTES NFR BLD AUTO: 13 %
MCH RBC QN AUTO: 23.2 PG (ref 26.5–33)
MCHC RBC AUTO-ENTMCNC: 28.7 G/DL (ref 31.5–36.5)
MCV RBC AUTO: 81 FL (ref 78–100)
MONOCYTES # BLD AUTO: 0.7 10E3/UL (ref 0–1.3)
MONOCYTES NFR BLD AUTO: 8 %
NEUTROPHILS # BLD AUTO: 7 10E3/UL (ref 1.6–8.3)
NEUTROPHILS NFR BLD AUTO: 74 %
NRBC # BLD AUTO: 0 10E3/UL
NRBC BLD AUTO-RTO: 0 /100
PLATELET # BLD AUTO: 407 10E3/UL (ref 150–450)
RBC # BLD AUTO: 4.06 10E6/UL (ref 3.8–5.2)
WBC # BLD AUTO: 9.3 10E3/UL (ref 4–11)

## 2022-08-19 PROCEDURE — 85610 PROTHROMBIN TIME: CPT | Mod: ORL | Performed by: NURSE PRACTITIONER

## 2022-08-19 PROCEDURE — 36415 COLL VENOUS BLD VENIPUNCTURE: CPT | Mod: ORL | Performed by: NURSE PRACTITIONER

## 2022-08-19 PROCEDURE — P9603 ONE-WAY ALLOW PRORATED MILES: HCPCS | Mod: ORL | Performed by: NURSE PRACTITIONER

## 2022-08-19 PROCEDURE — 85025 COMPLETE CBC W/AUTO DIFF WBC: CPT | Mod: ORL | Performed by: NURSE PRACTITIONER

## 2022-10-12 ENCOUNTER — LAB (OUTPATIENT)
Dept: LAB | Facility: CLINIC | Age: 82
End: 2022-10-12
Payer: COMMERCIAL

## 2022-10-12 DIAGNOSIS — L97.511 DIABETIC ULCER OF TOE OF RIGHT FOOT ASSOCIATED WITH TYPE 2 DIABETES MELLITUS, LIMITED TO BREAKDOWN OF SKIN (H): ICD-10-CM

## 2022-10-12 DIAGNOSIS — E11.621 DIABETIC ULCER OF TOE OF RIGHT FOOT ASSOCIATED WITH TYPE 2 DIABETES MELLITUS, LIMITED TO BREAKDOWN OF SKIN (H): ICD-10-CM

## 2022-10-12 LAB
ALBUMIN SERPL BCG-MCNC: 4.2 G/DL (ref 3.5–5.2)
ALP SERPL-CCNC: 181 U/L (ref 35–104)
ALT SERPL W P-5'-P-CCNC: 15 U/L (ref 10–35)
ANION GAP SERPL CALCULATED.3IONS-SCNC: 13 MMOL/L (ref 7–15)
AST SERPL W P-5'-P-CCNC: 24 U/L (ref 10–35)
BILIRUB SERPL-MCNC: 0.5 MG/DL
BUN SERPL-MCNC: 31.3 MG/DL (ref 8–23)
CALCIUM SERPL-MCNC: 9.4 MG/DL (ref 8.8–10.2)
CHLORIDE SERPL-SCNC: 101 MMOL/L (ref 98–107)
CREAT SERPL-MCNC: 1.57 MG/DL (ref 0.51–0.95)
DEPRECATED HCO3 PLAS-SCNC: 23 MMOL/L (ref 22–29)
GFR SERPL CREATININE-BSD FRML MDRD: 33 ML/MIN/1.73M2
GLUCOSE SERPL-MCNC: 211 MG/DL (ref 70–99)
HBA1C MFR BLD: 6.9 % (ref 0–5.6)
POTASSIUM SERPL-SCNC: 5.3 MMOL/L (ref 3.4–5.3)
PROT SERPL-MCNC: 6.7 G/DL (ref 6.4–8.3)
SODIUM SERPL-SCNC: 137 MMOL/L (ref 136–145)

## 2022-10-12 PROCEDURE — 83036 HEMOGLOBIN GLYCOSYLATED A1C: CPT

## 2022-10-12 PROCEDURE — 36415 COLL VENOUS BLD VENIPUNCTURE: CPT

## 2022-10-12 PROCEDURE — 80053 COMPREHEN METABOLIC PANEL: CPT

## 2022-10-16 DIAGNOSIS — R74.8 ELEVATED ALKALINE PHOSPHATASE LEVEL: Primary | ICD-10-CM

## 2022-10-19 ENCOUNTER — OFFICE VISIT (OUTPATIENT)
Dept: ENDOCRINOLOGY | Facility: CLINIC | Age: 82
End: 2022-10-19
Payer: COMMERCIAL

## 2022-10-19 VITALS — WEIGHT: 210.3 LBS | DIASTOLIC BLOOD PRESSURE: 60 MMHG | HEART RATE: 80 BPM | SYSTOLIC BLOOD PRESSURE: 110 MMHG

## 2022-10-19 DIAGNOSIS — M81.0 OSTEOPOROSIS, UNSPECIFIED OSTEOPOROSIS TYPE, UNSPECIFIED PATHOLOGICAL FRACTURE PRESENCE: ICD-10-CM

## 2022-10-19 DIAGNOSIS — R74.8 ELEVATED ALKALINE PHOSPHATASE LEVEL: ICD-10-CM

## 2022-10-19 DIAGNOSIS — L97.511 DIABETIC ULCER OF TOE OF RIGHT FOOT ASSOCIATED WITH TYPE 2 DIABETES MELLITUS, LIMITED TO BREAKDOWN OF SKIN (H): Primary | ICD-10-CM

## 2022-10-19 DIAGNOSIS — E11.621 DIABETIC ULCER OF TOE OF RIGHT FOOT ASSOCIATED WITH TYPE 2 DIABETES MELLITUS, LIMITED TO BREAKDOWN OF SKIN (H): Primary | ICD-10-CM

## 2022-10-19 PROCEDURE — 99215 OFFICE O/P EST HI 40 MIN: CPT | Performed by: INTERNAL MEDICINE

## 2022-10-19 NOTE — PATIENT INSTRUCTIONS
-Can try holding Acarbose for 1 week to see if gastrointestinal symptoms improve; if improved, can remain off Acarbose (in that case, please call clinic after 1 month and report glucose values)  -If gastrointestinal symptoms do not improve after 1 week off Acarbose, can resume Acarbose 25 mg with first bite of breakfast and dinner  -Continue Tradjenta and glipizide without changes  -Check blood glucose once daily but at alternating times, first thing in the morning on one day and before dinner on another; also check with any unusual symptoms that could be due to low blood glucose  -Continue calcium/vitamin D supplement  -Address osteoporosis with orthopedics primary care doctor according to your preference--I can consult if further questions (would recommend additional testing to look or other potential causes of osteoporosis and starting medical treatment)  -Return for a follow-up visit in 3 months, with labs before visit  -We will communicate results by letter, or if needed by phone

## 2022-10-19 NOTE — LETTER
10/19/2022         RE: Taylor Sanford  [address] [address]        Dear Colleague,    Thank you for referring your patient, Taylor Sanford, to the [facility]. Please see a copy of my visit note below.      ENDOCRINOLOGY FOLLOW-UP         HISTORY OF PRESENT ILLNESS    Taylor Sanford is seen in follow-up for the issues outlined below.     1.  Diabetes mellitus.  Continues on diabetes regimen without changes: Now taking acarbose more regularly, before her 2 meals each day.    Current diabetes regimen: Tradjenta 5 mg daily, glipizide XL 10 mg twice daily, acarbose 25 mg twice daily, with first bite of food.    Has noted some gas, although she has had longstanding gastrointestinal symptoms.  They also noticed that she has some stool incontinence when she passes gas.  Wonders if her GI symptoms may have worsened with addition of acarbose.    Does not have glucose meter with her today.  Recalls most glucose values are in the low to mid 100s, highest she recalls is in the 160s.  1 episode of hypoglycemia in the 60s since her last visit with me.    Records in care everywhere reviewed, had eye exam at [facility] on 8/26/2022: No diabetic retinopathy.    2.  Osteoporosis. DXA scan performed in the [facility] in 2020 showed osteoporosis.  Patient has history of right hip fracture 2 years ago after falling from standing height.    Last visit, I had asked patient to discuss osteoporosis work-up and treatment with her PCP since she preferred follow-up at [facility].  She notes today that she did not have a chance to follow-up on this but in the interim since last visit she was admitted to [facility] in 7/2022 after progressing hip/pelvic pain and was ultimately diagnosed with bilateral sacral insufficiency fractures and L2/L4 fractures thought to be chronic.    She has been prescribed unknown type of calcium by her orthopedist: Taking 1 tablet  twice daily.  This contains vitamin D and she is not taking a separate vitamin D supplement.    Has DXA scheduled tomorrow in the [facility] system.  She notes that her orthopedist has discussed referring her for further evaluation of bone health.  As before she prefers to follow-up on osteoporosis within the [facility] system.      As noted previously, she has history of recurrent kidney stones, last episode of renal colic 20 years ago. No history of long-term glucocorticoid therapy. No history of premature menopause.  No family history of osteoporosis.  No parental history of hip fracture.    Pertinent endocrine and related history:  1.  Diabetes mellitus, type 2.  Diagnosed approximately in the late 1990s.  -Metformin had to be discontinued due to change in kidney function.  2. History of amiodarone-induced thyroiditis. 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 Thyroid ultrasound 2011 showed heterogenous thyroid parenchyma: two subcentimeter thyroid nodules were noted, one in left and another in right lobe.  -Most recent thyroid US 9/24/2013 showed the following:  Right lobe spongiform 0.4 x 0.3  x 0.4 cm nodule in the midpole, previously measuring 0.3 x 0.2 cm.   Left lobe isoechoic nodule in the lower pole measuring 0.6 x 0.5 x 0.5 cm unchanged with little vascularity.   3. Chronic kidney disease.  4.  Atrial fibrillation.  5.  Coronary artery disease, status post PCI in 2002.  6.  Hyperlipidemia.  7.  Hypertension.  8.  Depression.    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  Had 2 children, a son and a daughter; her daughter passed away.    PAST MEDICAL HISTORY  Past Medical History:   Diagnosis Date     Benign essential hypertension      CAD (coronary artery disease)       Mixed hyperlipidemia      Paroxysmal atrial fibrillation (H)      Type 2 diabetes mellitus with skin complication, without long-term current use of insulin (H)        MEDICATIONS  Current Outpatient Medications   Medication Sig Dispense Refill     acarbose (PRECOSE) 25 MG tablet 25 mg PO twice a day with first bite of breakfast and dinner. 180 tablet 1     allopurinol (ZYLOPRIM) 100 MG tablet Take 200 mg by mouth       amLODIPine (NORVASC) 5 MG tablet amlodipine 5 mg tablet       aspirin (ASA) 81 MG chewable tablet Asprin Ec Low Dose       atorvastatin (LIPITOR) 80 MG tablet atorvastatin 80 mg tablet       blood glucose (ACCU-CHEK ADIS PLUS) test strip 1 strip by In Vitro route daily       buPROPion (WELLBUTRIN) 75 MG tablet One and a half tablets bid       Calcium Citrate (CITRACAL OR) Take 1 tablet by mouth daily       furosemide (LASIX) 20 MG tablet 40 mg daily       glipiZIDE (GLUCOTROL XL) 10 MG 24 hr tablet Take 10 mg by mouth 2 times daily       hyoscyamine (LEVSIN) 0.125 MG tablet Take 0.125 mg by mouth       linagliptin (TRADJENTA) 5 MG TABS tablet 5 mg daily       lisinopril (ZESTRIL) 5 MG tablet 5 mg daily       magnesium 250 MG tablet 2 tablets 2 times daily       metoprolol succinate ER (TOPROL-XL) 50 MG 24 hr tablet 150 mg       nitroFURantoin macrocrystal-monohydrate (MACROBID) 100 MG capsule        omeprazole (PRILOSEC) 20 MG DR capsule TAKE 1 CAPSULE DAILY AS NEEDED 1 HOUR BEFORE A MEAL FOR HEARTBURN. GENERIC FOR PRILOSEC       sodium fluoride dental gel (PREVIDENT) 1.1 % GEL topical gel Brush 2x/day.  Do not eat or drink for 30 minutes after.       trospium (SANCTURA) 20 MG tablet        warfarin ANTICOAGULANT (COUMADIN) 2 MG tablet warfarin 2 mg tablet       cholestyramine (QUESTRAN) 4 g packet  (Patient not taking: Reported on 10/19/2022)       cilostazol (PLETAL) 100 MG tablet Take 100 mg by mouth 2 times daily         Allergies, family, and social history were reviewed and documented as needed  in EHR.     REVIEW OF SYSTEMS  A focused ROS was performed, with pertinent positives and negatives as noted in the HPI.    PHYSICAL EXAM  /60   Pulse 80   Wt 95.4 kg (210 lb 4.8 oz)   There is no height or weight on file to calculate BMI.  Constitutional: Vital signs reviewed, as recorded above. Patient is alert, oriented and appears in no acute distress.  MSK: No clubbing or cyanosis; normal muscle bulk and tone. Using walker.  Neurological: Alert and oriented times 3.    DATA REVIEW  Each of the following laboratory and/or imaging studies were reviewed.  Component      Latest Ref Rng & Units 10/12/2022   Sodium      136 - 145 mmol/L 137   Potassium      3.4 - 5.3 mmol/L 5.3   Chloride      98 - 107 mmol/L 101   Carbon Dioxide (CO2)      22 - 29 mmol/L 23   Anion Gap      7 - 15 mmol/L 13   Urea Nitrogen      8.0 - 23.0 mg/dL 31.3 (H)   Creatinine      0.51 - 0.95 mg/dL 1.57 (H)   Calcium      8.8 - 10.2 mg/dL 9.4   Glucose      70 - 99 mg/dL 211 (H)   Alkaline Phosphatase      35 - 104 U/L 181 (H)   AST      10 - 35 U/L 24   ALT      10 - 35 U/L 15   Protein Total      6.4 - 8.3 g/dL 6.7   Albumin      3.5 - 5.2 g/dL 4.2   Bilirubin Total      <=1.2 mg/dL 0.5   GFR Estimate      >60 mL/min/1.73m2 33 (L)   Hemoglobin A1C      0.0 - 5.6 % 6.9 (H)       Labs drawn at [facility] on 7/29/2022: PTH 52, TSH 0.69, vitamin D 40    ASSESSMENT  1.  Diabetes mellitus, type 2.  Improving glycemia based on recall of fingerstick glucose data; her hemoglobin A1c has also improved significantly, although she recently had blood transfusion during her hospitalization in 7/2022 and this may affect her A1c (although this was 3 months ago).  For now, would continue current regimen: Since she is experiencing some gastrointestinal symptoms (albeit longstanding) can trial off acarbose for 1 week to see if her symptoms improve.  If so, have asked that she update me with glucose data in 1 month so that we can decide if any  other medication needs to be added (we have discussed addition of GLP-1 receptor agonist given her coexisting coronary artery disease and CKD but the patient has had reservations about injected medications).  On the other hand, if gastrointestinal symptoms do not improve off acarbose, she can resume the medication once more.  Follow-up in 3 months, sooner if needed.    2.  Diabetes preventive care.  -No known diabetic retinopathy, had annual eye exam at [facility]; my review of Care Everywhere records indicated no DR on eye exam on 8/26/2022  -CKD, with decline in kidney function; urine microalbumin screen checked in 7/2022 was normal; we discussed referral to nephrology but the patient has preferred to defer based on our discussion last visit  -Has peripheral neuropathic symptoms, has been treated in the past by Dr. Enciso for right hallux ulceration.    3.  Osteoporosis.  Based on DXA from 2020 and history of low trauma fracture.  Has now had additional fractures diagnosed.  Remains interested in follow-up within the [facility] system: I will therefore defer additional testing although I would strongly recommend work-up for secondary causes of osteoporosis and initiation of therapy (in light of CKD, Prolia would need to be considered).  She has DXA scheduled tomorrow and has follow-up with PCP and orthopedics.  As before, I am happy to consult on this if patient is interested in follow-up within our clinic or if preferred by PCP/orthopedics.  Continue calcium/vitamin D regimen.    4.  Hypertension.  Blood pressure controlled.    5.  Hyperlipidemia.  On statin therapy.    6.  Coronary artery disease and paroxysmal atrial fibrillation.  Following in cardiology.    7. History of hyperthyroidism. Clinically appears euthyroid.  Thyroid function tests checked in 7/2022 were normal.    8. History of thyroid nodules. Subcentimeter and stable based on last US report from 2013. Consider follow-up US in the future,  once more acute issues have stabilized.    PLAN  -Can try holding Acarbose for 1 week to see if gastrointestinal symptoms improve; if improved, can remain off Acarbose (in that case, please call clinic after 1 month and report glucose values)  -If gastrointestinal symptoms do not improve after 1 week off Acarbose, can resume Acarbose 25 mg with first bite of breakfast and dinner  -Continue Tradjenta and glipizide without changes  -Check blood glucose once daily but at alternating times, first thing in the morning on one day and before dinner on another; also check with any unusual symptoms that could be due to low blood glucose  -Continue calcium/vitamin D supplement  -Address osteoporosis with orthopedics or primary care doctor according to patient preference--I can consult if further questions (would recommend additional testing to look or other potential causes of osteoporosis and starting medical treatment)  -Return for a follow-up visit in 3 months, with labs before visit  -We will communicate results by letter, or if needed by phone       Orders Placed This Encounter   Procedures     Comprehensive metabolic panel     Hemoglobin A1c       I spent a total of 56 minutes on the date of encounter reviewing medical records, evaluating the patient, coordinating care and documenting in the EHR, as detailed above.      Adriano Sampson MD   Division of Diabetes, Endocrinology and Metabolism  Department of Medicine      cc: Pelon Drew MD; Tucker Tomas MD      Again, thank you for allowing me to participate in the care of your patient.        Sincerely,        ADRIANO Sampson MD

## 2022-10-19 NOTE — PROGRESS NOTES
ENDOCRINOLOGY FOLLOW-UP         HISTORY OF PRESENT ILLNESS    Taylor Sanford is seen in follow-up for the issues outlined below.     1.  Diabetes mellitus.  Continues on diabetes regimen without changes: Now taking acarbose more regularly, before her 2 meals each day.    Current diabetes regimen: Tradjenta 5 mg daily, glipizide XL 10 mg twice daily, acarbose 25 mg twice daily, with first bite of food.    Has noted some gas, although she has had longstanding gastrointestinal symptoms.  They also noticed that she has some stool incontinence when she passes gas.  Wonders if her GI symptoms may have worsened with addition of acarbose.    Does not have glucose meter with her today.  Recalls most glucose values are in the low to mid 100s, highest she recalls is in the 160s.  1 episode of hypoglycemia in the 60s since her last visit with me.    Records in care everywhere reviewed, had eye exam at [facility] on 8/26/2022: No diabetic retinopathy.    2.  Osteoporosis. DXA scan performed in the [facility] system in 2020 showed osteoporosis.  Patient has history of right hip fracture 2 years ago after falling from standing height.    Last visit, I had asked patient to discuss osteoporosis work-up and treatment with her PCP since she preferred follow-up at [facility].  She notes today that she did not have a chance to follow-up on this but in the interim since last visit she was admitted to [facility] in 7/2022 after progressing hip/pelvic pain and was ultimately diagnosed with bilateral sacral insufficiency fractures and L2/L4 fractures thought to be chronic.    She has been prescribed unknown type of calcium by her orthopedist: Taking 1 tablet twice daily.  This contains vitamin D and she is not taking a separate vitamin D supplement.    Has DXA scheduled tomorrow in the [facility] system.  She notes that her orthopedist has discussed referring her for further evaluation of bone health.  As before  she prefers to follow-up on osteoporosis within the [facility] system.      As noted previously, she has history of recurrent kidney stones, last episode of renal colic 20 years ago. No history of long-term glucocorticoid therapy. No history of premature menopause.  No family history of osteoporosis.  No parental history of hip fracture.    Pertinent endocrine and related history:  1.  Diabetes mellitus, type 2.  Diagnosed approximately in the late 1990s.  -Metformin had to be discontinued due to change in kidney function.  2. History of amiodarone-induced thyroiditis. 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 Thyroid ultrasound 2011 showed heterogenous thyroid parenchyma: two subcentimeter thyroid nodules were noted, one in left and another in right lobe.  -Most recent thyroid US 9/24/2013 showed the following:  Right lobe spongiform 0.4 x 0.3  x 0.4 cm nodule in the midpole, previously measuring 0.3 x 0.2 cm.   Left lobe isoechoic nodule in the lower pole measuring 0.6 x 0.5 x 0.5 cm unchanged with little vascularity.   3. Chronic kidney disease.  4.  Atrial fibrillation.  5.  Coronary artery disease, status post PCI in 2002.  6.  Hyperlipidemia.  7.  Hypertension.  8.  Depression.    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  Had 2 children, a son and a daughter; her daughter passed away.    PAST MEDICAL HISTORY  Past Medical History:   Diagnosis Date     Benign essential hypertension      CAD (coronary artery disease)      Mixed hyperlipidemia      Paroxysmal atrial fibrillation (H)      Type 2 diabetes mellitus with skin complication, without long-term current use of insulin (H)        MEDICATIONS  Current Outpatient Medications   Medication Sig Dispense Refill     acarbose (PRECOSE)  25 MG tablet 25 mg PO twice a day with first bite of breakfast and dinner. 180 tablet 1     allopurinol (ZYLOPRIM) 100 MG tablet Take 200 mg by mouth       amLODIPine (NORVASC) 5 MG tablet amlodipine 5 mg tablet       aspirin (ASA) 81 MG chewable tablet Asprin Ec Low Dose       atorvastatin (LIPITOR) 80 MG tablet atorvastatin 80 mg tablet       blood glucose (ACCU-CHEK ADIS PLUS) test strip 1 strip by In Vitro route daily       buPROPion (WELLBUTRIN) 75 MG tablet One and a half tablets bid       Calcium Citrate (CITRACAL OR) Take 1 tablet by mouth daily       furosemide (LASIX) 20 MG tablet 40 mg daily       glipiZIDE (GLUCOTROL XL) 10 MG 24 hr tablet Take 10 mg by mouth 2 times daily       hyoscyamine (LEVSIN) 0.125 MG tablet Take 0.125 mg by mouth       linagliptin (TRADJENTA) 5 MG TABS tablet 5 mg daily       lisinopril (ZESTRIL) 5 MG tablet 5 mg daily       magnesium 250 MG tablet 2 tablets 2 times daily       metoprolol succinate ER (TOPROL-XL) 50 MG 24 hr tablet 150 mg       nitroFURantoin macrocrystal-monohydrate (MACROBID) 100 MG capsule        omeprazole (PRILOSEC) 20 MG DR capsule TAKE 1 CAPSULE DAILY AS NEEDED 1 HOUR BEFORE A MEAL FOR HEARTBURN. GENERIC FOR PRILOSEC       sodium fluoride dental gel (PREVIDENT) 1.1 % GEL topical gel Brush 2x/day.  Do not eat or drink for 30 minutes after.       trospium (SANCTURA) 20 MG tablet        warfarin ANTICOAGULANT (COUMADIN) 2 MG tablet warfarin 2 mg tablet       cholestyramine (QUESTRAN) 4 g packet  (Patient not taking: Reported on 10/19/2022)       cilostazol (PLETAL) 100 MG tablet Take 100 mg by mouth 2 times daily         Allergies, family, and social history were reviewed and documented as needed in EHR.     REVIEW OF SYSTEMS  A focused ROS was performed, with pertinent positives and negatives as noted in the HPI.    PHYSICAL EXAM  /60   Pulse 80   Wt 95.4 kg (210 lb 4.8 oz)   There is no height or weight on file to calculate BMI.  Constitutional:  Vital signs reviewed, as recorded above. Patient is alert, oriented and appears in no acute distress.  MSK: No clubbing or cyanosis; normal muscle bulk and tone. Using walker.  Neurological: Alert and oriented times 3.    DATA REVIEW  Each of the following laboratory and/or imaging studies were reviewed.  Component      Latest Ref Rng & Units 10/12/2022   Sodium      136 - 145 mmol/L 137   Potassium      3.4 - 5.3 mmol/L 5.3   Chloride      98 - 107 mmol/L 101   Carbon Dioxide (CO2)      22 - 29 mmol/L 23   Anion Gap      7 - 15 mmol/L 13   Urea Nitrogen      8.0 - 23.0 mg/dL 31.3 (H)   Creatinine      0.51 - 0.95 mg/dL 1.57 (H)   Calcium      8.8 - 10.2 mg/dL 9.4   Glucose      70 - 99 mg/dL 211 (H)   Alkaline Phosphatase      35 - 104 U/L 181 (H)   AST      10 - 35 U/L 24   ALT      10 - 35 U/L 15   Protein Total      6.4 - 8.3 g/dL 6.7   Albumin      3.5 - 5.2 g/dL 4.2   Bilirubin Total      <=1.2 mg/dL 0.5   GFR Estimate      >60 mL/min/1.73m2 33 (L)   Hemoglobin A1C      0.0 - 5.6 % 6.9 (H)       Labs drawn at [facility] on 7/29/2022: PTH 52, TSH 0.69, vitamin D 40    ASSESSMENT  1.  Diabetes mellitus, type 2.  Improving glycemia based on recall of fingerstick glucose data; her hemoglobin A1c has also improved significantly, although she recently had blood transfusion during her hospitalization in 7/2022 and this may affect her A1c (although this was 3 months ago).  For now, would continue current regimen: Since she is experiencing some gastrointestinal symptoms (albeit longstanding) can trial off acarbose for 1 week to see if her symptoms improve.  If so, have asked that she update me with glucose data in 1 month so that we can decide if any other medication needs to be added (we have discussed addition of GLP-1 receptor agonist given her coexisting coronary artery disease and CKD but the patient has had reservations about injected medications).  On the other hand, if gastrointestinal symptoms do not  improve off acarbose, she can resume the medication once more.  Follow-up in 3 months, sooner if needed.    2.  Diabetes preventive care.  -No known diabetic retinopathy, [facility]; my review of Care Everywhere records indicated no DR on eye exam on 8/26/2022  -CKD, with decline in kidney function; urine microalbumin screen checked in 7/2022 was normal; we discussed referral to nephrology but the patient has preferred to defer based on our discussion last visit  -Has peripheral neuropathic symptoms, has been treated in the past by Dr. Enciso for right hallux ulceration.    3.  Osteoporosis.  Based on DXA from 2020 and history of low trauma fracture.  Has now had additional fractures diagnosed.  Remains interested in follow-up within the [facility] system: I will therefore defer additional testing although I would strongly recommend work-up for secondary causes of osteoporosis and initiation of therapy (in light of CKD, Prolia would need to be considered).  She has DXA scheduled tomorrow and has follow-up with PCP and orthopedics.  As before, I am happy to consult on this if patient is interested in follow-up within our clinic or if preferred by PCP/orthopedics.  Continue calcium/vitamin D regimen.    4.  Hypertension.  Blood pressure controlled.    5.  Hyperlipidemia.  On statin therapy.    6.  Coronary artery disease and paroxysmal atrial fibrillation.  Following in cardiology.    7. History of hyperthyroidism. Clinically appears euthyroid.  Thyroid function tests checked in 7/2022 were normal.    8. History of thyroid nodules. Subcentimeter and stable based on last US report from 2013. Consider follow-up US in the future, once more acute issues have stabilized.    PLAN  -Can try holding Acarbose for 1 week to see if gastrointestinal symptoms improve; if improved, can remain off Acarbose (in that case, please call clinic after 1 month and report glucose values)  -If gastrointestinal  symptoms do not improve after 1 week off Acarbose, can resume Acarbose 25 mg with first bite of breakfast and dinner  -Continue Tradjenta and glipizide without changes  -Check blood glucose once daily but at alternating times, first thing in the morning on one day and before dinner on another; also check with any unusual symptoms that could be due to low blood glucose  -Continue calcium/vitamin D supplement  -Address osteoporosis with orthopedics or primary care doctor according to patient preference--I can consult if further questions (would recommend additional testing to look or other potential causes of osteoporosis and starting medical treatment)  -Return for a follow-up visit in 3 months, with labs before visit  -We will communicate results by letter, or if needed by phone       Orders Placed This Encounter   Procedures     Comprehensive metabolic panel     Hemoglobin A1c       I spent a total of 56 minutes on the date of encounter reviewing medical records, evaluating the patient, coordinating care and documenting in the EHR, as detailed above.      Clarissa Sampson MD   Division of Diabetes, Endocrinology and Metabolism  Department of Medicine      cc: Pelon Drew MD; Tucker Tomas MD

## 2022-12-07 ENCOUNTER — TELEPHONE (OUTPATIENT)
Dept: ENDOCRINOLOGY | Facility: CLINIC | Age: 82
End: 2022-12-07

## 2022-12-09 ENCOUNTER — OFFICE VISIT (OUTPATIENT)
Dept: VASCULAR SURGERY | Facility: CLINIC | Age: 82
End: 2022-12-09
Attending: PODIATRIST
Payer: COMMERCIAL

## 2022-12-09 VITALS
HEART RATE: 80 BPM | DIASTOLIC BLOOD PRESSURE: 66 MMHG | BODY MASS INDEX: 33.75 KG/M2 | HEIGHT: 66 IN | SYSTOLIC BLOOD PRESSURE: 120 MMHG | TEMPERATURE: 98.3 F | WEIGHT: 210 LBS

## 2022-12-09 DIAGNOSIS — L97.511 DIABETIC ULCER OF TOE OF RIGHT FOOT ASSOCIATED WITH TYPE 2 DIABETES MELLITUS, LIMITED TO BREAKDOWN OF SKIN (H): Primary | ICD-10-CM

## 2022-12-09 DIAGNOSIS — E11.621 DIABETIC ULCER OF TOE OF RIGHT FOOT ASSOCIATED WITH TYPE 2 DIABETES MELLITUS, LIMITED TO BREAKDOWN OF SKIN (H): Primary | ICD-10-CM

## 2022-12-09 PROCEDURE — 99212 OFFICE O/P EST SF 10 MIN: CPT | Performed by: PODIATRIST

## 2022-12-09 PROCEDURE — G0463 HOSPITAL OUTPT CLINIC VISIT: HCPCS

## 2022-12-09 RX ORDER — VIBEGRON 75 MG/1
TABLET, FILM COATED ORAL
COMMUNITY
Start: 2022-08-29

## 2022-12-09 RX ORDER — GABAPENTIN 300 MG/1
300 CAPSULE ORAL 2 TIMES DAILY
COMMUNITY
Start: 2022-09-07 | End: 2023-08-02

## 2022-12-09 RX ORDER — ATORVASTATIN CALCIUM 80 MG/1
80 TABLET, FILM COATED ORAL
COMMUNITY
Start: 2022-07-19

## 2022-12-09 RX ORDER — ALLOPURINOL 100 MG/1
200 TABLET ORAL
COMMUNITY
Start: 2022-10-03

## 2022-12-09 ASSESSMENT — PAIN SCALES - GENERAL: PAINLEVEL: MILD PAIN (2)

## 2022-12-09 NOTE — PROGRESS NOTES
FOOT AND ANKLE SURGERY/PODIATRY Progress Note      ASSESSMENT: Diabetic Ulceration right hallux       TREATMENT:  -I discussed with the patient that there are no open lesions on the right hallux.    -I do recommend she continue to use the pumice stone 2-3 days per week to remove callus tissue, and continue to see the provider for nail and callus trimming q2 months.     -She will follow-up with me as concerns develop.     Wily Enciso DPM  [facility]      HPI: Taylor Sanford was seen again today for concerns related to a sore on the right hallux. The patient's nail trimming nurse recently noticed an open sore on the right hallux. She continues to use a pumice stone on the callus tissue.       Past Medical History:   Diagnosis Date     Benign essential hypertension      CAD (coronary artery disease)      Mixed hyperlipidemia      Paroxysmal atrial fibrillation (H)      Type 2 diabetes mellitus with skin complication, without long-term current use of insulin (H)        Past Surgical History:   Procedure Laterality Date     APPENDECTOMY       CARDIAC SURGERY       CHOLECYSTECTOMY         Allergies   Allergen Reactions     Amiodarone      Developed Thyroid toxicity     Sulfonylureas Rash     Amantadine Unknown     Amlodipine Unknown     Sulfa Drugs Other (See Comments)         Current Outpatient Medications:      acarbose (PRECOSE) 25 MG tablet, 25 mg PO twice a day with first bite of breakfast and dinner., Disp: 180 tablet, Rfl: 1     allopurinol (ZYLOPRIM) 100 MG tablet, Take 200 mg by mouth, Disp: , Rfl:      allopurinol (ZYLOPRIM) 100 MG tablet, Take 200 mg by mouth, Disp: , Rfl:      amLODIPine (NORVASC) 5 MG tablet, amlodipine 5 mg tablet, Disp: , Rfl:      aspirin (ASA) 81 MG chewable tablet, Asprin Ec Low Dose, Disp: , Rfl:      atorvastatin (LIPITOR) 80 MG tablet, Take 80 mg by mouth, Disp: , Rfl:      atorvastatin (LIPITOR) 80 MG tablet, atorvastatin 80 mg tablet, Disp: , Rfl:       blood glucose (ACCU-CHEK ADIS PLUS) test strip, 1 strip by In Vitro route daily, Disp: , Rfl:      buPROPion (WELLBUTRIN) 75 MG tablet, One and a half tablets bid, Disp: , Rfl:      Calcium Carb-Cholecalciferol (CALCIUM CARBONATE-VITAMIN D3) 600-400 MG-UNIT TABS, Take 1 tablet by mouth 2 times daily, Disp: , Rfl:      Calcium Citrate (CITRACAL OR), Take 1 tablet by mouth daily, Disp: , Rfl:      cholestyramine (QUESTRAN) 4 g packet, , Disp: , Rfl:      furosemide (LASIX) 20 MG tablet, 40 mg daily, Disp: , Rfl:      gabapentin (NEURONTIN) 300 MG capsule, , Disp: , Rfl:      glipiZIDE (GLUCOTROL XL) 10 MG 24 hr tablet, Take 10 mg by mouth 2 times daily, Disp: , Rfl:      hyoscyamine (LEVSIN) 0.125 MG tablet, Take 0.125 mg by mouth, Disp: , Rfl:      linagliptin (TRADJENTA) 5 MG TABS tablet, 5 mg daily, Disp: , Rfl:      lisinopril (ZESTRIL) 5 MG tablet, 5 mg daily, Disp: , Rfl:      magnesium 250 MG tablet, 2 tablets 2 times daily, Disp: , Rfl:      metoprolol succinate ER (TOPROL-XL) 50 MG 24 hr tablet, 150 mg, Disp: , Rfl:      nitroFURantoin macrocrystal-monohydrate (MACROBID) 100 MG capsule, , Disp: , Rfl:      omeprazole (PRILOSEC) 20 MG DR capsule, Take 20 mg by mouth, Disp: , Rfl:      omeprazole (PRILOSEC) 20 MG DR capsule, TAKE 1 CAPSULE DAILY AS NEEDED 1 HOUR BEFORE A MEAL FOR HEARTBURN. GENERIC FOR PRILOSEC, Disp: , Rfl:      sodium fluoride dental gel (PREVIDENT) 1.1 % GEL topical gel, Brush 2x/day.  Do not eat or drink for 30 minutes after., Disp: , Rfl:      trospium (SANCTURA) 20 MG tablet, , Disp: , Rfl:      Vibegron (GEMTESA) 75 MG TABS tablet, , Disp: , Rfl:      warfarin ANTICOAGULANT (COUMADIN) 2 MG tablet, warfarin 2 mg tablet, Disp: , Rfl:      cilostazol (PLETAL) 100 MG tablet, Take 100 mg by mouth 2 times daily, Disp: , Rfl:     Review of Systems - 10 point Review of Systems is negative except for right hallux ulcer which is noted in HPI.      OBJECTIVE:  /66   Pulse 80   Temp 98.3  F  "(36.8  C)   Ht 5' 6\" (1.676 m)   Wt 210 lb (95.3 kg)   BMI 33.89 kg/m    General appearance: Patient is alert and fully cooperative with history & exam.  No sign of distress is noted during the visit.    Vascular: Dorsalis pedis non-palpableBilateral.  Dermatologic:    VASC Wound RIGHT HALLUX (Active)   Post Size Length 0 12/09/22 0800   Post Size Width 0 12/09/22 0800   Post Size Depth 0 12/09/22 0800   Post Total Sq cm 0 12/09/22 0800   Description callous 06/10/22 1000     Neurologic: Diminished to light touch Bilateral.  Musculoskeletal: Contracted digits noted Bilateral.    Imaging:     No results found.       Picture:         "

## 2023-01-11 ENCOUNTER — TRANSFERRED RECORDS (OUTPATIENT)
Dept: HEALTH INFORMATION MANAGEMENT | Facility: CLINIC | Age: 83
End: 2023-01-11

## 2023-01-20 ENCOUNTER — LAB (OUTPATIENT)
Dept: LAB | Facility: CLINIC | Age: 83
End: 2023-01-20
Payer: COMMERCIAL

## 2023-01-20 DIAGNOSIS — L97.511 DIABETIC ULCER OF TOE OF RIGHT FOOT ASSOCIATED WITH TYPE 2 DIABETES MELLITUS, LIMITED TO BREAKDOWN OF SKIN (H): ICD-10-CM

## 2023-01-20 DIAGNOSIS — E11.621 DIABETIC ULCER OF TOE OF RIGHT FOOT ASSOCIATED WITH TYPE 2 DIABETES MELLITUS, LIMITED TO BREAKDOWN OF SKIN (H): ICD-10-CM

## 2023-01-20 LAB
ALBUMIN SERPL BCG-MCNC: 4.3 G/DL (ref 3.5–5.2)
ALP SERPL-CCNC: 114 U/L (ref 35–104)
ALT SERPL W P-5'-P-CCNC: 14 U/L (ref 10–35)
ANION GAP SERPL CALCULATED.3IONS-SCNC: 11 MMOL/L (ref 7–15)
AST SERPL W P-5'-P-CCNC: 20 U/L (ref 10–35)
BILIRUB SERPL-MCNC: 0.5 MG/DL
BUN SERPL-MCNC: 30.9 MG/DL (ref 8–23)
CALCIUM SERPL-MCNC: 9 MG/DL (ref 8.8–10.2)
CHLORIDE SERPL-SCNC: 109 MMOL/L (ref 98–107)
CREAT SERPL-MCNC: 1.41 MG/DL (ref 0.51–0.95)
DEPRECATED HCO3 PLAS-SCNC: 22 MMOL/L (ref 22–29)
GFR SERPL CREATININE-BSD FRML MDRD: 38 ML/MIN/1.73M2
GLUCOSE SERPL-MCNC: 138 MG/DL (ref 70–99)
HBA1C MFR BLD: 7.6 % (ref 0–5.6)
POTASSIUM SERPL-SCNC: 5.5 MMOL/L (ref 3.4–5.3)
PROT SERPL-MCNC: 6.4 G/DL (ref 6.4–8.3)
SODIUM SERPL-SCNC: 142 MMOL/L (ref 136–145)

## 2023-01-20 PROCEDURE — 83036 HEMOGLOBIN GLYCOSYLATED A1C: CPT

## 2023-01-20 PROCEDURE — 80053 COMPREHEN METABOLIC PANEL: CPT

## 2023-01-20 PROCEDURE — 36415 COLL VENOUS BLD VENIPUNCTURE: CPT

## 2023-01-22 ENCOUNTER — TELEPHONE (OUTPATIENT)
Dept: ENDOCRINOLOGY | Facility: CLINIC | Age: 83
End: 2023-01-22
Payer: COMMERCIAL

## 2023-01-23 NOTE — TELEPHONE ENCOUNTER
Endocrine team, would you please update patient with the following message:   -Previsit labs show stable kidney function but mildly elevated potassium level.  This can represent lab error but may also be a true abnormality: since abnormalities in potassium could have side effect, including abnormal heart rhythm, would recommend rechecking at her earliest convenience (ideally before visit on 1/25/2023).  I have placed order for potassium.  -Hemoglobin A1c has risen: We will discuss this in more depth at upcoming visit.

## 2023-01-23 NOTE — TELEPHONE ENCOUNTER
I called the pt and discussed the below. The pt could not come for labs until the day of her appt. Lab appt scheduled prior to her visit.

## 2023-01-25 ENCOUNTER — OFFICE VISIT (OUTPATIENT)
Dept: ENDOCRINOLOGY | Facility: CLINIC | Age: 83
End: 2023-01-25
Payer: COMMERCIAL

## 2023-01-25 ENCOUNTER — LAB (OUTPATIENT)
Dept: LAB | Facility: CLINIC | Age: 83
End: 2023-01-25
Payer: COMMERCIAL

## 2023-01-25 VITALS
DIASTOLIC BLOOD PRESSURE: 68 MMHG | SYSTOLIC BLOOD PRESSURE: 120 MMHG | BODY MASS INDEX: 34.54 KG/M2 | WEIGHT: 214 LBS | HEART RATE: 72 BPM

## 2023-01-25 DIAGNOSIS — L97.511 DIABETIC ULCER OF TOE OF RIGHT FOOT ASSOCIATED WITH TYPE 2 DIABETES MELLITUS, LIMITED TO BREAKDOWN OF SKIN (H): ICD-10-CM

## 2023-01-25 DIAGNOSIS — E11.65 TYPE 2 DIABETES MELLITUS WITH HYPERGLYCEMIA, WITHOUT LONG-TERM CURRENT USE OF INSULIN (H): Primary | ICD-10-CM

## 2023-01-25 DIAGNOSIS — E11.621 DIABETIC ULCER OF TOE OF RIGHT FOOT ASSOCIATED WITH TYPE 2 DIABETES MELLITUS, LIMITED TO BREAKDOWN OF SKIN (H): ICD-10-CM

## 2023-01-25 LAB
ANION GAP SERPL CALCULATED.3IONS-SCNC: 14 MMOL/L (ref 7–15)
BUN SERPL-MCNC: 30.2 MG/DL (ref 8–23)
CALCIUM SERPL-MCNC: 9.2 MG/DL (ref 8.8–10.2)
CHLORIDE SERPL-SCNC: 104 MMOL/L (ref 98–107)
CREAT SERPL-MCNC: 1.55 MG/DL (ref 0.51–0.95)
DEPRECATED HCO3 PLAS-SCNC: 21 MMOL/L (ref 22–29)
GFR SERPL CREATININE-BSD FRML MDRD: 33 ML/MIN/1.73M2
GLUCOSE SERPL-MCNC: 174 MG/DL (ref 70–99)
POTASSIUM SERPL-SCNC: 5.5 MMOL/L (ref 3.4–5.3)
SODIUM SERPL-SCNC: 139 MMOL/L (ref 136–145)

## 2023-01-25 PROCEDURE — 99215 OFFICE O/P EST HI 40 MIN: CPT | Performed by: INTERNAL MEDICINE

## 2023-01-25 PROCEDURE — 80048 BASIC METABOLIC PNL TOTAL CA: CPT

## 2023-01-25 PROCEDURE — 36415 COLL VENOUS BLD VENIPUNCTURE: CPT

## 2023-01-25 RX ORDER — DULAGLUTIDE 0.75 MG/.5ML
0.75 INJECTION, SOLUTION SUBCUTANEOUS
Qty: 2 ML | Refills: 3 | Status: SHIPPED | OUTPATIENT
Start: 2023-01-25 | End: 2023-04-26 | Stop reason: DRUGHIGH

## 2023-01-25 NOTE — LETTER
1/25/2023         RE: Taylor Sanford  [address] [address]        Dear Colleague,    Thank you for referring your patient, Taylor Sanford, to the [facility]. Please see a copy of my visit note below.      ENDOCRINOLOGY FOLLOW-UP         HISTORY OF PRESENT ILLNESS    Taylor Sanford is seen in follow-up for the issues outlined below.     At last visit, we discussed a trial off acarbose since she was experiencing significant gas and had history of intermittent stool incontinence.  She noted significant improvement in gas with trial off acarbose.  Incontinence has not changed significantly.  We therefore discussed remaining off acarbose.    Current diabetes regimen: Tradjenta 5 mg daily, glipizide XL 10 mg twice daily.    Notes that she had some dietary indiscretions over the holiday season: Is now back to her usual dietary regimen minimizing refined sugars.      Although she is checking fingerstick glucose only once daily in the morning (does not remember to check later in the day), she is checking on a daily basis.  Average glucose on log is 124 mg/dL.    Records in care everywhere reviewed, had eye exam at [facility] on 8/26/2022: No diabetic retinopathy.  Has eye appointment at [facility] tomorrow.    Had  angio in the [facility] system in the interim since last visit, PAD noted.    Pertinent endocrine and related history:  1.  Diabetes mellitus, type 2.  Diagnosed approximately in the late 1990s.  -Metformin had to be discontinued due to change in kidney function.  2. History of amiodarone-induced thyroiditis. 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 Thyroid ultrasound 2011 showed heterogenous thyroid parenchyma: two subcentimeter thyroid nodules were noted, one in left and  another in right lobe.  -Most recent thyroid US 9/24/2013 showed the following:  Right lobe spongiform 0.4 x 0.3  x 0.4 cm nodule in the midpole, previously measuring 0.3 x 0.2 cm.   Left lobe isoechoic nodule in the lower pole measuring 0.6 x 0.5 x 0.5 cm unchanged with little vascularity.   3. Chronic kidney disease.  4.  Osteoporosis.  Noted on DXA scan performed in the [email or web address] system in 2020.  History of right hip fracture after falling from standing height.  Has preferred to have osteoporosis managed by orthopedics and PCP.  5.  Atrial fibrillation.  6.  Coronary artery disease, status post PCI in 2002.  7.  Hyperlipidemia.  8.  Hypertension.  9.  Depression.    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  Had 2 children, a son and a daughter; her daughter passed away.    PAST MEDICAL HISTORY  Past Medical History:   Diagnosis Date     Benign essential hypertension      CAD (coronary artery disease)      Mixed hyperlipidemia      Paroxysmal atrial fibrillation (H)      Type 2 diabetes mellitus with skin complication, without long-term current use of insulin (H)        MEDICATIONS  Current Outpatient Medications   Medication Sig Dispense Refill     acarbose (PRECOSE) 25 MG tablet 25 mg PO twice a day with first bite of breakfast and dinner. 180 tablet 1     allopurinol (ZYLOPRIM) 100 MG tablet Take 200 mg by mouth       allopurinol (ZYLOPRIM) 100 MG tablet Take 200 mg by mouth       amLODIPine (NORVASC) 5 MG tablet amlodipine 5 mg tablet       aspirin (ASA) 81 MG chewable tablet Asprin Ec Low Dose       atorvastatin (LIPITOR) 80 MG tablet Take 80 mg by mouth       atorvastatin (LIPITOR) 80 MG tablet atorvastatin 80 mg tablet       blood glucose (ACCU-CHEK ADIS PLUS) test strip 1 strip by In Vitro route daily       buPROPion (WELLBUTRIN) 75 MG tablet One and a half tablets bid       Calcium Carb-Cholecalciferol (CALCIUM CARBONATE-VITAMIN D3)  600-400 MG-UNIT TABS Take 1 tablet by mouth 2 times daily       Calcium Citrate (CITRACAL OR) Take 1 tablet by mouth daily       cholestyramine (QUESTRAN) 4 g packet        cilostazol (PLETAL) 100 MG tablet Take 100 mg by mouth 2 times daily       furosemide (LASIX) 20 MG tablet 40 mg daily       gabapentin (NEURONTIN) 300 MG capsule        glipiZIDE (GLUCOTROL XL) 10 MG 24 hr tablet Take 10 mg by mouth 2 times daily       hyoscyamine (LEVSIN) 0.125 MG tablet Take 0.125 mg by mouth       linagliptin (TRADJENTA) 5 MG TABS tablet 5 mg daily       lisinopril (ZESTRIL) 5 MG tablet 5 mg daily       magnesium 250 MG tablet 2 tablets 2 times daily       metoprolol succinate ER (TOPROL-XL) 50 MG 24 hr tablet 150 mg       nitroFURantoin macrocrystal-monohydrate (MACROBID) 100 MG capsule        omeprazole (PRILOSEC) 20 MG DR capsule Take 20 mg by mouth       omeprazole (PRILOSEC) 20 MG DR capsule TAKE 1 CAPSULE DAILY AS NEEDED 1 HOUR BEFORE A MEAL FOR HEARTBURN. GENERIC FOR PRILOSEC       sodium fluoride dental gel (PREVIDENT) 1.1 % GEL topical gel Brush 2x/day.  Do not eat or drink for 30 minutes after.       trospium (SANCTURA) 20 MG tablet        Vibegron (GEMTESA) 75 MG TABS tablet        warfarin ANTICOAGULANT (COUMADIN) 2 MG tablet warfarin 2 mg tablet         Allergies, family, and social history were reviewed and documented as needed in EHR.     REVIEW OF SYSTEMS  A focused ROS was performed, with pertinent positives and negatives as noted in the HPI.    PHYSICAL EXAM  /68 (BP Location: Right arm, Patient Position: Sitting, Cuff Size: Adult Large)   Pulse 72   Wt 97.1 kg (214 lb)   BMI 34.54 kg/m    Body mass index is 34.54 kg/m .  Constitutional: Vital signs reviewed, as recorded above. Patient is alert, oriented and appears in no acute distress.  MSK: No clubbing or cyanosis; normal muscle bulk and tone. Using walker.  Neurological: Alert and oriented times 3.    DATA REVIEW  Each of the following  laboratory and/or imaging studies were reviewed.    Component      Latest Ref Rng & Units 1/20/2023   Sodium      136 - 145 mmol/L 142   Potassium      3.4 - 5.3 mmol/L 5.5 (H)   Chloride      98 - 107 mmol/L 109 (H)   Carbon Dioxide (CO2)      22 - 29 mmol/L 22   Anion Gap      7 - 15 mmol/L 11   Urea Nitrogen      8.0 - 23.0 mg/dL 30.9 (H)   Creatinine      0.51 - 0.95 mg/dL 1.41 (H)   Calcium      8.8 - 10.2 mg/dL 9.0   Glucose      70 - 99 mg/dL 138 (H)   Alkaline Phosphatase      35 - 104 U/L 114 (H)   AST      10 - 35 U/L 20   ALT      10 - 35 U/L 14   Protein Total      6.4 - 8.3 g/dL 6.4   Albumin      3.5 - 5.2 g/dL 4.3   Bilirubin Total      <=1.2 mg/dL 0.5   GFR Estimate      >60 mL/min/1.73m2 38 (L)   Hemoglobin A1C      0.0 - 5.6 % 7.6 (H)       ASSESSMENT  1.  Diabetes mellitus, type 2.  Hemoglobin A1c checked prior to this visit indicates hyperglycemia.  Only checking fasting glucose values, which are optimal.  May have undocumented hyperglycemia in the latter part of the day.  Would consider changing to GLP-1 receptor agonist not only for improved glycemic control but also for cardiorenal benefits.  We discussed benefits and potential side effects of GLP-1 receptor agonist therapy: She has no history of pancreatitis or familiar medullary thyroid carcinoma; we reviewed potential for nausea and the importance of keeping well-hydrated to minimize volume depletion and worsening of renal function.  We will discontinue Tradjenta when she starts GLP-1 receptor agonist.  I reviewed use of GLP-1 receptor agonist pen (we will prescribe Trulicity), storage and administration technique; Ms. Sanford is able to demonstrate this back to me.    2.  Diabetes preventive care.  -No known diabetic retinopathy, had annual eye exam at [facility]; my review of Care Everywhere records indicated no DR on eye exam on 8/26/2022 (has upcoming visit)  -CKD, has preferred to defer nephrology referral; negative urine  microalbumin screen in 7/2022  -Has peripheral neuropathic symptoms, has been treated in the past by Dr. Enciso for right hallux ulceration.    3.  Osteoporosis.  Based on DXA from 2020 and history of low trauma fracture.  Has now had additional fractures diagnosed.  Has preferred to follow-up on this with orthopedics and PCP.    4.  Hypertension.  Blood pressure controlled.    5.  Hyperlipidemia.  On statin therapy.    6.  Coronary artery disease and paroxysmal atrial fibrillation.  Following in cardiology.    7. History of hyperthyroidism. Clinically appears euthyroid.  Thyroid function tests checked in 7/2022 were normal.    8. History of thyroid nodules. Subcentimeter and stable based on last US report from 2013. Consider follow-up US in the future, once more acute issues have stabilized.    9.  Hyperkalemia.  Noted on previsit labs.  May be spurious.  Rechecked prior to this visit and results are pending.  We will update patient once results are available.    PLAN  -Await pending potassium level--if normal, I will send result letter  -Can remain off acarbose  -Start Trulicity 0.75 mg weekly  -Keep well hydrated  -Stop Tradjenta when Trulicity is started  -Continue glipizide without changes  -Check blood glucose once daily as much as possible at alternating times, first thing in the morning on one day and before dinner on another; also check with any unusual symptoms that could be due to low blood glucose  -Return for a follow-up visit in 3 months, with labs before visit  -We will communicate results by letter, or if needed by phone       Orders Placed This Encounter   Procedures     Hemoglobin A1c     Basic metabolic panel       I spent a total of 52 minutes on the date of encounter reviewing medical records, evaluating the patient, coordinating care and documenting in the EHR, as detailed above.      Clarissa Sampson MD   Division of Diabetes, Endocrinology and Metabolism  Department of Medicine      cc:  Pelon Drew MD        Again, thank you for allowing me to participate in the care of your patient.        Sincerely,        ADRIANO Sampson MD

## 2023-01-25 NOTE — PROGRESS NOTES
ENDOCRINOLOGY FOLLOW-UP         HISTORY OF PRESENT ILLNESS    Taylor Sanford is seen in follow-up for the issues outlined below.     At last visit, we discussed a trial off acarbose since she was experiencing significant gas and had history of intermittent stool incontinence.  She noted significant improvement in gas with trial off acarbose.  Incontinence has not changed significantly.  We therefore discussed remaining off acarbose.    Current diabetes regimen: Tradjenta 5 mg daily, glipizide XL 10 mg twice daily.    Notes that she had some dietary indiscretions over the holiday season: Is now back to her usual dietary regimen minimizing refined sugars.      Although she is checking fingerstick glucose only once daily in the morning (does not remember to check later in the day), she is checking on a daily basis.  Average glucose on log is 124 mg/dL.    Records in care everywhere reviewed, had eye exam at [facility] on 8/26/2022: No diabetic retinopathy.  Has eye appointment at [facility] tomorrow.    Had MR angio in the [facility] system in the interim since last visit, PAD noted.    Pertinent endocrine and related history:  1.  Diabetes mellitus, type 2.  Diagnosed approximately in the late 1990s.  -Metformin had to be discontinued due to change in kidney function.  2. History of amiodarone-induced thyroiditis. 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 Thyroid ultrasound 2011 showed heterogenous thyroid parenchyma: two subcentimeter thyroid nodules were noted, one in left and another in right lobe.  -Most recent thyroid US 9/24/2013 showed the following:  Right lobe spongiform 0.4 x 0.3  x 0.4 cm nodule in the midpole, previously measuring 0.3 x 0.2 cm.   Left lobe isoechoic nodule in the lower pole measuring 0.6 x 0.5 x 0.5 cm  unchanged with little vascularity.   3. Chronic kidney disease.  4.  Osteoporosis.  Noted on DXA scan performed in the [employer] system in 2020.  History of right hip fracture after falling from standing height.  Has preferred to have osteoporosis managed by orthopedics and PCP.  5.  Atrial fibrillation.  6.  Coronary artery disease, status post PCI in 2002.  7.  Hyperlipidemia.  8.  Hypertension.  9.  Depression.    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  Had 2 children, a son and a daughter; her daughter passed away.    PAST MEDICAL HISTORY  Past Medical History:   Diagnosis Date     Benign essential hypertension      CAD (coronary artery disease)      Mixed hyperlipidemia      Paroxysmal atrial fibrillation (H)      Type 2 diabetes mellitus with skin complication, without long-term current use of insulin (H)        MEDICATIONS  Current Outpatient Medications   Medication Sig Dispense Refill     acarbose (PRECOSE) 25 MG tablet 25 mg PO twice a day with first bite of breakfast and dinner. 180 tablet 1     allopurinol (ZYLOPRIM) 100 MG tablet Take 200 mg by mouth       allopurinol (ZYLOPRIM) 100 MG tablet Take 200 mg by mouth       amLODIPine (NORVASC) 5 MG tablet amlodipine 5 mg tablet       aspirin (ASA) 81 MG chewable tablet Asprin Ec Low Dose       atorvastatin (LIPITOR) 80 MG tablet Take 80 mg by mouth       atorvastatin (LIPITOR) 80 MG tablet atorvastatin 80 mg tablet       blood glucose (ACCU-CHEK ADIS PLUS) test strip 1 strip by In Vitro route daily       buPROPion (WELLBUTRIN) 75 MG tablet One and a half tablets bid       Calcium Carb-Cholecalciferol (CALCIUM CARBONATE-VITAMIN D3) 600-400 MG-UNIT TABS Take 1 tablet by mouth 2 times daily       Calcium Citrate (CITRACAL OR) Take 1 tablet by mouth daily       cholestyramine (QUESTRAN) 4 g packet        cilostazol (PLETAL) 100 MG tablet Take 100 mg by mouth 2 times daily       furosemide  (LASIX) 20 MG tablet 40 mg daily       gabapentin (NEURONTIN) 300 MG capsule        glipiZIDE (GLUCOTROL XL) 10 MG 24 hr tablet Take 10 mg by mouth 2 times daily       hyoscyamine (LEVSIN) 0.125 MG tablet Take 0.125 mg by mouth       linagliptin (TRADJENTA) 5 MG TABS tablet 5 mg daily       lisinopril (ZESTRIL) 5 MG tablet 5 mg daily       magnesium 250 MG tablet 2 tablets 2 times daily       metoprolol succinate ER (TOPROL-XL) 50 MG 24 hr tablet 150 mg       nitroFURantoin macrocrystal-monohydrate (MACROBID) 100 MG capsule        omeprazole (PRILOSEC) 20 MG DR capsule Take 20 mg by mouth       omeprazole (PRILOSEC) 20 MG DR capsule TAKE 1 CAPSULE DAILY AS NEEDED 1 HOUR BEFORE A MEAL FOR HEARTBURN. GENERIC FOR PRILOSEC       sodium fluoride dental gel (PREVIDENT) 1.1 % GEL topical gel Brush 2x/day.  Do not eat or drink for 30 minutes after.       trospium (SANCTURA) 20 MG tablet        Vibegron (GEMTESA) 75 MG TABS tablet        warfarin ANTICOAGULANT (COUMADIN) 2 MG tablet warfarin 2 mg tablet         Allergies, family, and social history were reviewed and documented as needed in EHR.     REVIEW OF SYSTEMS  A focused ROS was performed, with pertinent positives and negatives as noted in the HPI.    PHYSICAL EXAM  /68 (BP Location: Right arm, Patient Position: Sitting, Cuff Size: Adult Large)   Pulse 72   Wt 97.1 kg (214 lb)   BMI 34.54 kg/m    Body mass index is 34.54 kg/m .  Constitutional: Vital signs reviewed, as recorded above. Patient is alert, oriented and appears in no acute distress.  MSK: No clubbing or cyanosis; normal muscle bulk and tone. Using walker.  Neurological: Alert and oriented times 3.    DATA REVIEW  Each of the following laboratory and/or imaging studies were reviewed.    Component      Latest Ref Rng & Units 1/20/2023   Sodium      136 - 145 mmol/L 142   Potassium      3.4 - 5.3 mmol/L 5.5 (H)   Chloride      98 - 107 mmol/L 109 (H)   Carbon Dioxide (CO2)      22 - 29 mmol/L 22    Anion Gap      7 - 15 mmol/L 11   Urea Nitrogen      8.0 - 23.0 mg/dL 30.9 (H)   Creatinine      0.51 - 0.95 mg/dL 1.41 (H)   Calcium      8.8 - 10.2 mg/dL 9.0   Glucose      70 - 99 mg/dL 138 (H)   Alkaline Phosphatase      35 - 104 U/L 114 (H)   AST      10 - 35 U/L 20   ALT      10 - 35 U/L 14   Protein Total      6.4 - 8.3 g/dL 6.4   Albumin      3.5 - 5.2 g/dL 4.3   Bilirubin Total      <=1.2 mg/dL 0.5   GFR Estimate      >60 mL/min/1.73m2 38 (L)   Hemoglobin A1C      0.0 - 5.6 % 7.6 (H)       ASSESSMENT  1.  Diabetes mellitus, type 2.  Hemoglobin A1c checked prior to this visit indicates hyperglycemia.  Only checking fasting glucose values, which are optimal.  May have undocumented hyperglycemia in the latter part of the day.  Would consider changing to GLP-1 receptor agonist not only for improved glycemic control but also for cardiorenal benefits.  We discussed benefits and potential side effects of GLP-1 receptor agonist therapy: She has no history of pancreatitis or familiar medullary thyroid carcinoma; we reviewed potential for nausea and the importance of keeping well-hydrated to minimize volume depletion and worsening of renal function.  We will discontinue Tradjenta when she starts GLP-1 receptor agonist.  I reviewed use of GLP-1 receptor agonist pen (we will prescribe Trulicity), storage and administration technique; Ms. Sanford is able to demonstrate this back to me.    2.  Diabetes preventive care.  -No known diabetic retinopathy, had annual eye exam at [facility]; my review of Care Everywhere records indicated no DR on eye exam on 8/26/2022 (has upcoming visit)  -CKD, has preferred to defer nephrology referral; negative urine microalbumin screen in 7/2022  -Has peripheral neuropathic symptoms, has been treated in the past by Dr. Enciso for right hallux ulceration.    3.  Osteoporosis.  Based on DXA from 2020 and history of low trauma fracture.  Has now had additional fractures diagnosed.  Has  preferred to follow-up on this with orthopedics and PCP.    4.  Hypertension.  Blood pressure controlled.    5.  Hyperlipidemia.  On statin therapy.    6.  Coronary artery disease and paroxysmal atrial fibrillation.  Following in cardiology.    7. History of hyperthyroidism. Clinically appears euthyroid.  Thyroid function tests checked in 7/2022 were normal.    8. History of thyroid nodules. Subcentimeter and stable based on last US report from 2013. Consider follow-up US in the future, once more acute issues have stabilized.    9.  Hyperkalemia.  Noted on previsit labs.  May be spurious.  Rechecked prior to this visit and results are pending.  We will update patient once results are available.    PLAN  -Await pending potassium level--if normal, I will send result letter  -Can remain off acarbose  -Start Trulicity 0.75 mg weekly  -Keep well hydrated  -Stop Tradjenta when Trulicity is started  -Continue glipizide without changes  -Check blood glucose once daily as much as possible at alternating times, first thing in the morning on one day and before dinner on another; also check with any unusual symptoms that could be due to low blood glucose  -Return for a follow-up visit in 3 months, with labs before visit  -We will communicate results by letter, or if needed by phone       Orders Placed This Encounter   Procedures     Hemoglobin A1c     Basic metabolic panel       I spent a total of 52 minutes on the date of encounter reviewing medical records, evaluating the patient, coordinating care and documenting in the EHR, as detailed above.      Clarissa Sampson MD   Division of Diabetes, Endocrinology and Metabolism  Department of Medicine      cc: Pelon Drew MD

## 2023-01-25 NOTE — PATIENT INSTRUCTIONS
-Await pending potassium level--if normal, I will send result letter  -Can remain off acarbose  -Start Trulicity 0.75 mg weekly  -Keep well hydrated  -Once you start Trulicity, please stop Tradjenta  -Continue glipizide without changes  -Check blood glucose once daily as much as possible at alternating times, first thing in the morning on one day and before dinner on another; also check with any unusual symptoms that could be due to low blood glucose  -Return for a follow-up visit in 3 months, with labs before visit  -We will communicate results by letter, or if needed by phone

## 2023-01-27 ENCOUNTER — TELEPHONE (OUTPATIENT)
Dept: ENDOCRINOLOGY | Facility: CLINIC | Age: 83
End: 2023-01-27
Payer: COMMERCIAL

## 2023-01-27 NOTE — LETTER
January 27, 2023      Taylor Sanford  [address] [id number]        Dear MsTheeneel,    We are writing to inform you of your test results.    Potassium is mildly elevated, this was also noted on prior lab results  -For now, would recommend holding lisinopril since this can exacerbate high potassium)  -I will contact her primary care clinic to let them know of this so that she can follow-up (hopefully within the coming weeks) to recheck potassium and discuss long-term plans for treatment  -Please fax this note to primary care clinic ([facility] in [place]): I will also contact their clinic with an update myself    Resulted Orders   Basic metabolic panel   Result Value Ref Range    Sodium 139 136 - 145 mmol/L    Potassium 5.5 (H) 3.4 - 5.3 mmol/L    Chloride 104 98 - 107 mmol/L    Carbon Dioxide (CO2) 21 (L) 22 - 29 mmol/L    Anion Gap 14 7 - 15 mmol/L    Urea Nitrogen 30.2 (H) 8.0 - 23.0 mg/dL    Creatinine 1.55 (H) 0.51 - 0.95 mg/dL    Calcium 9.2 8.8 - 10.2 mg/dL    Glucose 174 (H) 70 - 99 mg/dL    GFR Estimate 33 (L) >60 mL/min/1.73m2      Comment:      eGFR calculated using 2021 CKD-EPI equation.       If you have any questions or concerns, please call the clinic at the number listed above.     Sincerely,

## 2023-01-27 NOTE — TELEPHONE ENCOUNTER
Endocrine team, would you please update patient with the following message:  -Potassium is mildly elevated, this was also noted on prior lab results  -For now, would recommend holding lisinopril since this can exacerbate high potassium)  -I will contact her primary care clinic to let them know of this so that she can follow-up (hopefully within the coming weeks) to recheck potassium and discuss long-term plans for treatment  -Please fax this note to primary care clinic ([facility]): I will also contact their clinic with an update myself    Clarissa Sampson MD 1/27/2023 3:58 PM      Addendum 1/27/2023: Have tried calling [facility] this afternoon without success.  There is no dedicated physician line at the clinic number and the wait time is extremely long.  Since we have already asked patient to adjust her medication regimen, I will contact the clinic on Monday, 1/30/2023 to communicate the above.    Clarissa Sampson MD 1/27/2023 6:24 PM        Addendum 1/30/2023: Called and spoke to staff at [facility].  Staff member, Radha, notes that Dr. Drew prefers that messages be left with updates.  Have therefore updated staff that patient has had mild hyperkalemia captured on 2 separate occasions.  I have asked her to hold lisinopril and follow-up in primary care.  Have left my contact information in case Dr. Drew has any follow-up questions or concerns.    Clarissa Sampson MD 1/30/2023 2:01 PM

## 2023-01-27 NOTE — TELEPHONE ENCOUNTER
Spoke to pt and informed of below. Pt will follow up with primary and cardiologist as pt states cardiologist was the one that ordered lisinopril should manage it. Informed pt she can ask but normally a pcp would manage this.

## 2023-02-27 ENCOUNTER — OFFICE VISIT (OUTPATIENT)
Dept: PODIATRY | Facility: CLINIC | Age: 83
End: 2023-02-27
Payer: COMMERCIAL

## 2023-02-27 VITALS
WEIGHT: 214 LBS | BODY MASS INDEX: 34.54 KG/M2 | HEART RATE: 108 BPM | RESPIRATION RATE: 14 BRPM | OXYGEN SATURATION: 89 %

## 2023-02-27 DIAGNOSIS — M20.41 HAMMERTOE OF RIGHT FOOT: ICD-10-CM

## 2023-02-27 DIAGNOSIS — L57.0 KERATOMA: ICD-10-CM

## 2023-02-27 DIAGNOSIS — I73.9 PAD (PERIPHERAL ARTERY DISEASE) (H): ICD-10-CM

## 2023-02-27 DIAGNOSIS — B35.1 ONYCHOMYCOSIS: ICD-10-CM

## 2023-02-27 DIAGNOSIS — E08.42 DIABETES MELLITUS DUE TO UNDERLYING CONDITION WITH DIABETIC POLYNEUROPATHY, UNSPECIFIED WHETHER LONG TERM INSULIN USE (H): ICD-10-CM

## 2023-02-27 DIAGNOSIS — L97.511 DIABETIC ULCER OF TOE OF RIGHT FOOT ASSOCIATED WITH TYPE 2 DIABETES MELLITUS, LIMITED TO BREAKDOWN OF SKIN (H): Primary | ICD-10-CM

## 2023-02-27 DIAGNOSIS — E11.621 DIABETIC ULCER OF TOE OF RIGHT FOOT ASSOCIATED WITH TYPE 2 DIABETES MELLITUS, LIMITED TO BREAKDOWN OF SKIN (H): Primary | ICD-10-CM

## 2023-02-27 PROCEDURE — 11720 DEBRIDE NAIL 1-5: CPT | Mod: 51 | Performed by: PODIATRIST

## 2023-02-27 PROCEDURE — 11056 PARNG/CUTG B9 HYPRKR LES 2-4: CPT | Mod: 51 | Performed by: PODIATRIST

## 2023-02-27 PROCEDURE — 11042 DBRDMT SUBQ TIS 1ST 20SQCM/<: CPT | Performed by: PODIATRIST

## 2023-02-27 PROCEDURE — 99213 OFFICE O/P EST LOW 20 MIN: CPT | Mod: 25 | Performed by: PODIATRIST

## 2023-02-27 ASSESSMENT — PAIN SCALES - GENERAL: PAINLEVEL: MILD PAIN (2)

## 2023-02-27 NOTE — PROGRESS NOTES
FOOT AND ANKLE SURGERY/PODIATRY Progress Note      ASSESSMENT:   Diabetic ulceration third digit right foot  Keratoma  Onychomycosis  Hammertoe    A new wound was identified today: Third digit right foot    TREATMENT:  -I discussed with the patient that she has a full-thickness ulceration about the distal third digit right foot.  We reviewed etiology to include a rigid hammertoe deformity.  Discussed that surgical correction of this deformity may be necessary in the future to prevent future skin breakdown.    -Recommend limited walking in a surgical shoe which was dispensed today.    -After discussion of risk factors and consent obtained 2% Lidocaine HCL jelly was applied, under clean conditions, the third digit right foot ulceration(s) were debrided using #15 blade scalpel.  Devitalized and nonviable tissue, along with any fibrin and slough, was removed to improve granulation tissue formation, stimulate wound healing, decrease overall bacteria load, disrupt biofilm formation and decrease edge senescence. Wound drainage was scant No. Total excisional debridement was 0.16 sq cm into the subcutaneous tissue with a depth of 0.2 cm.   Ulcers were improved afterwards and .  Measures were as noted on the flow sheet. Collagen with a gauze dresssing was applied. She will continue to apply Collagen with a gauze dresssing as directed.    -Debrided nails 1 through 5 right foot in length and thickness.    -Trimmed callus tissue medial right hallux with a #15 blade x4.    -She will follow-up with me in 3 weeks at the vascular center.    Wily Enciso DPM  [facility]      HPI: Taylor Sanford was seen again today for pain on her right foot.  She has noticed discoloration along the end of the third digit and became concerned over the past week.  Denies trauma.  Patient is known to me having seen her in December for concerns of a sore on her right great toe.    Past Medical History:   Diagnosis  Date     Benign essential hypertension      CAD (coronary artery disease)      Mixed hyperlipidemia      Paroxysmal atrial fibrillation (H)      Type 2 diabetes mellitus with skin complication, without long-term current use of insulin (H)        Past Surgical History:   Procedure Laterality Date     APPENDECTOMY       CARDIAC SURGERY       CHOLECYSTECTOMY         Allergies   Allergen Reactions     Amiodarone      Developed Thyroid toxicity     Sulfonylureas Rash     Amantadine Unknown     Amlodipine Unknown     Sulfa Drugs Other (See Comments)         Current Outpatient Medications:      allopurinol (ZYLOPRIM) 100 MG tablet, Take 200 mg by mouth, Disp: , Rfl:      amLODIPine (NORVASC) 5 MG tablet, amlodipine 5 mg tablet, Disp: , Rfl:      aspirin (ASA) 81 MG chewable tablet, Asprin Ec Low Dose, Disp: , Rfl:      atorvastatin (LIPITOR) 80 MG tablet, Take 80 mg by mouth, Disp: , Rfl:      blood glucose (ACCU-CHEK ADIS PLUS) test strip, 1 strip by In Vitro route daily, Disp: , Rfl:      buPROPion (WELLBUTRIN) 75 MG tablet, One and a half tablets bid, Disp: , Rfl:      Calcium Citrate (CITRACAL OR), Take 1 tablet by mouth daily, Disp: , Rfl:      dulaglutide (TRULICITY) 0.75 MG/0.5ML pen, Inject 0.75 mg Subcutaneous every 7 days, Disp: 2 mL, Rfl: 3     furosemide (LASIX) 20 MG tablet, 40 mg daily, Disp: , Rfl:      gabapentin (NEURONTIN) 300 MG capsule, 300 mg 2 times daily, Disp: , Rfl:      glipiZIDE (GLUCOTROL XL) 10 MG 24 hr tablet, Take 10 mg by mouth 2 times daily, Disp: , Rfl:      hyoscyamine (LEVSIN) 0.125 MG tablet, Take 0.125 mg by mouth, Disp: , Rfl:      lisinopril (ZESTRIL) 5 MG tablet, 5 mg daily, Disp: , Rfl:      magnesium 250 MG tablet, 2 tablets 2 times daily, Disp: , Rfl:      metoprolol succinate ER (TOPROL-XL) 50 MG 24 hr tablet, 150 mg, Disp: , Rfl:      nitroFURantoin macrocrystal-monohydrate (MACROBID) 100 MG capsule, Take 100 mg by mouth daily, Disp: , Rfl:      omeprazole (PRILOSEC) 20 MG DR  capsule, Take 20 mg by mouth daily, Disp: , Rfl:      trospium (SANCTURA) 20 MG tablet, , Disp: , Rfl:      Vibegron (GEMTESA) 75 MG TABS tablet, , Disp: , Rfl:      warfarin ANTICOAGULANT (COUMADIN) 2 MG tablet, warfarin 2 mg tablet, Disp: , Rfl:      Calcium Carb-Cholecalciferol (CALCIUM CARBONATE-VITAMIN D3) 600-400 MG-UNIT TABS, Take 1 tablet by mouth 2 times daily (Patient not taking: Reported on 1/25/2023), Disp: , Rfl:      cholestyramine (QUESTRAN) 4 g packet, , Disp: , Rfl:      cilostazol (PLETAL) 100 MG tablet, Take 100 mg by mouth 2 times daily, Disp: , Rfl:      sodium fluoride dental gel (PREVIDENT) 1.1 % GEL topical gel, Brush 2x/day.  Do not eat or drink for 30 minutes after. (Patient not taking: Reported on 1/25/2023), Disp: , Rfl:     Review of Systems - 10 point Review of Systems is negative except for ulceration right foot which is noted in HPI.      OBJECTIVE:  Pulse 108   Resp 14   Wt 97.1 kg (214 lb)   SpO2 (!) 89%   BMI 34.54 kg/m    General appearance: Patient is alert and fully cooperative with history & exam.  No sign of distress is noted during the visit.    Vascular: Dorsalis pedis/PT non-palpableRight.  Dermatologic:    VASC Wound RIGHT HALLUX (Active)   Ulceration distal third digit right foot measuring 0.4x 0.4X 0.2 cm, granular base without increased depth, no fluctuance.  No erythema right foot.  Nails 1 through 5 right foot elongated thick yellow subungual debris.  Hyperkeratotic tissue x4 medial right hallux. Trophic changes noted including diminished hair growth and shiny skin.  Neurologic: Diminished to light touch Right.  Musculoskeletal: Contracted digits noted right foot.    Imaging:     No results found.       Picture: None

## 2023-02-27 NOTE — LETTER
2/27/2023         RE: Taylor Sanford  [address] [facility] [address]        Dear Colleague,    Thank you for referring your patient, Taylor Sanford, to the [facility]. Please see a copy of my visit note below.    FOOT AND ANKLE SURGERY/PODIATRY Progress Note      ASSESSMENT:   Diabetic ulceration third digit right foot  Keratoma  Onychomycosis  Hammertoe    A new wound was identified today: Third digit right foot    TREATMENT:  -I discussed with the patient that she has a full-thickness ulceration about the distal third digit right foot.  We reviewed etiology to include a rigid hammertoe deformity.  Discussed that surgical correction of this deformity may be necessary in the future to prevent future skin breakdown.    -Recommend limited walking in a surgical shoe which was dispensed today.    -After discussion of risk factors and consent obtained 2% Lidocaine HCL jelly was applied, under clean conditions, the third digit right foot ulceration(s) were debrided using #15 blade scalpel.  Devitalized and nonviable tissue, along with any fibrin and slough, was removed to improve granulation tissue formation, stimulate wound healing, decrease overall bacteria load, disrupt biofilm formation and decrease edge senescence. Wound drainage was scant No. Total excisional debridement was 0.16 sq cm into the subcutaneous tissue with a depth of 0.2 cm.   Ulcers were improved afterwards and .  Measures were as noted on the flow sheet. Collagen with a gauze dresssing was applied. She will continue to apply Collagen with a gauze dresssing as directed.    -Debrided nails 1 through 5 right foot in length and thickness.    -Trimmed callus tissue medial right hallux with a #15 blade x4.    -She will follow-up with me in 3 weeks at the vascular center.    Wily Enciso DPM  [facility]      HPI: Taylor Sanford was seen again today for pain on her right  foot.  She has noticed discoloration along the end of the third digit and became concerned over the past week.  Denies trauma.  Patient is known to me having seen her in December for concerns of a sore on her right great toe.    Past Medical History:   Diagnosis Date     Benign essential hypertension      CAD (coronary artery disease)      Mixed hyperlipidemia      Paroxysmal atrial fibrillation (H)      Type 2 diabetes mellitus with skin complication, without long-term current use of insulin (H)        Past Surgical History:   Procedure Laterality Date     APPENDECTOMY       CARDIAC SURGERY       CHOLECYSTECTOMY         Allergies   Allergen Reactions     Amiodarone      Developed Thyroid toxicity     Sulfonylureas Rash     Amantadine Unknown     Amlodipine Unknown     Sulfa Drugs Other (See Comments)         Current Outpatient Medications:      allopurinol (ZYLOPRIM) 100 MG tablet, Take 200 mg by mouth, Disp: , Rfl:      amLODIPine (NORVASC) 5 MG tablet, amlodipine 5 mg tablet, Disp: , Rfl:      aspirin (ASA) 81 MG chewable tablet, Asprin Ec Low Dose, Disp: , Rfl:      atorvastatin (LIPITOR) 80 MG tablet, Take 80 mg by mouth, Disp: , Rfl:      blood glucose (ACCU-CHEK ADIS PLUS) test strip, 1 strip by In Vitro route daily, Disp: , Rfl:      buPROPion (WELLBUTRIN) 75 MG tablet, One and a half tablets bid, Disp: , Rfl:      Calcium Citrate (CITRACAL OR), Take 1 tablet by mouth daily, Disp: , Rfl:      dulaglutide (TRULICITY) 0.75 MG/0.5ML pen, Inject 0.75 mg Subcutaneous every 7 days, Disp: 2 mL, Rfl: 3     furosemide (LASIX) 20 MG tablet, 40 mg daily, Disp: , Rfl:      gabapentin (NEURONTIN) 300 MG capsule, 300 mg 2 times daily, Disp: , Rfl:      glipiZIDE (GLUCOTROL XL) 10 MG 24 hr tablet, Take 10 mg by mouth 2 times daily, Disp: , Rfl:      hyoscyamine (LEVSIN) 0.125 MG tablet, Take 0.125 mg by mouth, Disp: , Rfl:      lisinopril (ZESTRIL) 5 MG tablet, 5 mg daily, Disp: , Rfl:      magnesium 250 MG tablet, 2  tablets 2 times daily, Disp: , Rfl:      metoprolol succinate ER (TOPROL-XL) 50 MG 24 hr tablet, 150 mg, Disp: , Rfl:      nitroFURantoin macrocrystal-monohydrate (MACROBID) 100 MG capsule, Take 100 mg by mouth daily, Disp: , Rfl:      omeprazole (PRILOSEC) 20 MG DR capsule, Take 20 mg by mouth daily, Disp: , Rfl:      trospium (SANCTURA) 20 MG tablet, , Disp: , Rfl:      Vibegron (GEMTESA) 75 MG TABS tablet, , Disp: , Rfl:      warfarin ANTICOAGULANT (COUMADIN) 2 MG tablet, warfarin 2 mg tablet, Disp: , Rfl:      Calcium Carb-Cholecalciferol (CALCIUM CARBONATE-VITAMIN D3) 600-400 MG-UNIT TABS, Take 1 tablet by mouth 2 times daily (Patient not taking: Reported on 1/25/2023), Disp: , Rfl:      cholestyramine (QUESTRAN) 4 g packet, , Disp: , Rfl:      cilostazol (PLETAL) 100 MG tablet, Take 100 mg by mouth 2 times daily, Disp: , Rfl:      sodium fluoride dental gel (PREVIDENT) 1.1 % GEL topical gel, Brush 2x/day.  Do not eat or drink for 30 minutes after. (Patient not taking: Reported on 1/25/2023), Disp: , Rfl:     Review of Systems - 10 point Review of Systems is negative except for ulceration right foot which is noted in HPI.      OBJECTIVE:  Pulse 108   Resp 14   Wt 97.1 kg (214 lb)   SpO2 (!) 89%   BMI 34.54 kg/m    General appearance: Patient is alert and fully cooperative with history & exam.  No sign of distress is noted during the visit.    Vascular: Dorsalis pedis/PT non-palpableRight.  Dermatologic:    VASC Wound RIGHT HALLUX (Active)   Ulceration distal third digit right foot measuring 0.4x 0.4X 0.2 cm, granular base without increased depth, no fluctuance.  No erythema right foot.  Nails 1 through 5 right foot elongated thick yellow subungual debris.  Hyperkeratotic tissue x4 medial right hallux. Trophic changes noted including diminished hair growth and shiny skin.  Neurologic: Diminished to light touch Right.  Musculoskeletal: Contracted digits noted right foot.    Imaging:     No results found.        Picture: None        Again, thank you for allowing me to participate in the care of your patient.        Sincerely,        Wily Enciso DPM

## 2023-02-27 NOTE — PATIENT INSTRUCTIONS
Dressing Application     1. Endoform should be cut to the size of the wound.  It should touch the edges of the ulcer.  It can overlap the edges just very small amount.  Then, moisten with saline. (If it is easier for you, you can moisten it before laying it in the wound)    2. Endoform is naturally used by the body over time so you may not find it in the wound when you change your dressing.  If you do find some, gently cleanse the wound with saline. Do not remove the remaining Endoform, which may appear as an off-white to fontaine gel, just add Endoform on top.  Usually, more Endoform will need to be added every 5-7 days.     5.  Change your top dressing every 4-5 days depending on drainage.     -Endoform is a collagen dressing created from ovine (sheep) fore-stomach tissue. The collagen extracellular matrix transforms into a soft conforming sheet, which is naturally incorporated into the wound over time.  Collagen dressings are used to stimulate wound healing.       Limited walking on Right foot . Okay for transferring and ambulating inside home but no community ambulation.         What is a post surgical shoe?    A post surgical shoe is a medical shoe used to protect the foot and toes after an injury or surgery. It is also called a postop shoe, rigid sole shoe, or hard sole shoe. It looks like on oversized shoe with a flat, hard sole, fabric or mesh sides, and adjustable straps. The shoe is open in the front, where your toes go. The shoe helps change how your foot carries weight. This can help decrease pain and increase movement after an injury or surgery so you can heal.                How do I put on the post surgical shoe?  Sit down and place your foot comfortably in the shoe.  Close the fabric or mesh sides over the top of your foot.  Tighten the straps of the shoe so they are snug but not too tight. The shoe should limit movement but not cut off your blood flow.  Stand up and take a few steps to practice  walking.    What else do I need to know?  Check your foot and toes often. Check your foot and toes for redness and swelling. If your toes are red, swollen, numb, or tingly, loosen your straps. Over time, the swelling from the injury or surgery will decrease. When this happens, you may need to tighten the straps.  Be careful when you walk on wet surfaces. The shoe may be slippery.  Ask about removing the shoe to bathe. Your provider may want you to leave the shoe on when you bathe. Cover it with a plastic bag and tape the bag closed around your leg.  When should I call my doctor?  You have pain or discomfort that does not go away   Driving a Car  You cannot drive while you are wearing a cast or walker boot on the foot that you use to drive. Your surgeon or physiotherapist will tell you when the cast or boot is no longer needed.     Make sure to wear the shoe for the prescribed time or until the doctor tells you to discontinue it s use.

## 2023-03-03 ENCOUNTER — TELEPHONE (OUTPATIENT)
Dept: VASCULAR SURGERY | Facility: CLINIC | Age: 83
End: 2023-03-03
Payer: COMMERCIAL

## 2023-03-03 DIAGNOSIS — L97.511 DIABETIC ULCER OF TOE OF RIGHT FOOT ASSOCIATED WITH TYPE 2 DIABETES MELLITUS, LIMITED TO BREAKDOWN OF SKIN (H): Primary | ICD-10-CM

## 2023-03-03 DIAGNOSIS — E11.621 DIABETIC ULCER OF TOE OF RIGHT FOOT ASSOCIATED WITH TYPE 2 DIABETES MELLITUS, LIMITED TO BREAKDOWN OF SKIN (H): Primary | ICD-10-CM

## 2023-03-03 NOTE — TELEPHONE ENCOUNTER
Caller: Patient     Provider: MD Wily Enciso    Detailed reason for call: Patient states that Dr. Enciso was to put in orders to set her up for home care, but she has not heard anything about someone coming out to see her. She is wondering if she needs to change her dressings herself.    Best phone number to contact: [phone]    Best time to contact: Any     Ok to leave a detailed message: Yes    Ok to speak to authorized person if needed: No      (Noted to patient if reason is related to wound or incision, to please send a photo via email or [employer].)

## 2023-03-03 NOTE — TELEPHONE ENCOUNTER
Spoke with pt,  Advised HC was ordered today, but it is not a guaranteed HC can start right away or at all.   Pt states she will change the dressing her self in the mean time.     Aissatou Vincent LPN on 3/3/2023 at 10:47 AM

## 2023-03-06 ENCOUNTER — TELEPHONE (OUTPATIENT)
Dept: VASCULAR SURGERY | Facility: CLINIC | Age: 83
End: 2023-03-06
Payer: COMMERCIAL

## 2023-03-06 NOTE — TELEPHONE ENCOUNTER
Reviewed limited weight bearing with surgical shoe. Advised to elevate. May try cam boot again. Has tried orthotic in surgical shoe. Advised to not use it as it will make it less stable. Will call with further questions.

## 2023-03-06 NOTE — TELEPHONE ENCOUNTER
Caller: Zohra    Provider: MD Wily Enciso    Detailed reason for call: Zohra from [facility] is calling, stating she did the pt assessment yesterday, she is asking for order. She is asking for a RN to call back with verbal orders    Best phone number to contact: [phone]    Best time to contact: any    Ok to leave a detailed message: Yes    Ok to speak to authorized person if needed: No      (Noted to patient if reason is related to wound or incision, to please send a photo via email or [employer].)

## 2023-03-06 NOTE — TELEPHONE ENCOUNTER
Caller: Taylor    Provider: MD Wily Enciso    Detailed reason for call: Patient states the shoe that Dr. Enciso told the pt to wear is very uncomfortable and that she can not wear it.  She tried putting her orthotics in it to make it more comfortable, but that did not work.  She would like a call back to advise.     Best phone number to contact: [phone]    Best time to contact: any    Ok to leave a detailed message: Yes    Ok to speak to authorized person if needed: No (no one else will answer her phone anyway)      (Noted to patient if reason is related to wound or incision, to please send a photo via email or [employer].)

## 2023-03-07 ENCOUNTER — TELEPHONE (OUTPATIENT)
Dept: VASCULAR SURGERY | Facility: CLINIC | Age: 83
End: 2023-03-07
Payer: COMMERCIAL

## 2023-03-07 NOTE — TELEPHONE ENCOUNTER
"Sharath Huizar  was seen as a follow up.    CHIEF COMPLAINT:  Sputum Production      HISTORY OF PRESENT ILLNESS: Sharath Huizar is a 71 y.o. male  has a past medical history of Coronary artery disease, Hypertension, Myocardial infarction, NSTEMI (non-ST elevated myocardial infarction) (3/23/2018), and Nuclear sclerosis of both eyes (6/13/2019).  Our first encounter was 3/6/20.  At that time, patient presented to ED on 2/27/20 for coughing of "chunks of blood."  2 episodes of hemoptysis prior to ED presentation.  At that time, patient with sore throat but overall feeling better.  cxr with rll opacity.  Patient was discharged with doxycycline and recommend to follow up in pulmonary clinic.        Former [employer] for 24 years, then [employer] x 18 years.  Retire after mi in 2018.  Currently on asa and plavix.  Still active at home.  Still cut grass.  No known tb contact.  No foreign travel.      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  S/p ct guided biopsy of Lung biopsy on rll nodule on 4/27/20 with squamous and adenocarcinoma.  Planned for chemo and radiation.  Sputum afb was positive.  Speciation pending.      Today, patient denied fever/chills.  No hemoptysis since ED visit on 2/27.  Patient smoked 1 ppd x 30 plus years.  Quit smoking since last visit.  + weight loss.  No chest pain.  Occasional phlegm production.        PAST MEDICAL HISTORY:    Active Ambulatory Problems     Diagnosis Date Noted    Hypertension, essential 03/23/2018    Tobacco abuse 03/23/2018    Leukocytosis 03/24/2018    Elevated LFTs 03/24/2018    Chronic systolic heart failure 03/25/2018    Coronary artery disease involving native coronary artery of native heart without angina pectoris 03/26/2018    MCKEON (dyspnea on exertion) 05/15/2018    Atherosclerosis of aorta 07/03/2018    Chronic pain of both shoulders 07/24/2018    Decreased range of motion of shoulder 07/24/2018    " Yumiko calling back from [facility].  She states she never got a message but confirmed the phone number is correct.  She would like a call back to get the orders and to discuss this patient further.     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    Cavitary lung disease 03/06/2020    Nasal congestion 03/06/2020    DANYELLE (obstructive sleep apnea) 03/06/2020    Hemoptysis 03/06/2020    Coronary artery disease with exertional angina 03/17/2020    Supraglottic mass 04/20/2020    Lung mass 04/27/2020    Larynx cancer 05/05/2020    Lung cancer 05/08/2020     Resolved Ambulatory Problems     Diagnosis Date Noted    NSTEMI (non-ST elevated myocardial infarction) 03/23/2018     Past Medical History:   Diagnosis Date    Coronary artery disease     Hypertension     Myocardial infarction                 PAST SURGICAL HISTORY:    Past Surgical History:   Procedure Laterality Date    CARDIAC CATHETERIZATION      with 4 stents    DIRECT LARYNGOSCOPY N/A 4/20/2020    Procedure: LARYNGOSCOPY, DIRECT;  Surgeon: Genoveva Vallejo MD;  Location: [facility] OR;  Service: ENT;  Laterality: N/A;    LEFT HEART CATHETERIZATION Left 5/29/2018    Procedure: Left heart cath;  Surgeon: Viral Lombardi MD;  Location: [facility] CATH LAB;  Service: Cardiology;  Laterality: Left;  RN PREOP 5/28/18    LUNG BIOPSY N/A 4/27/2020    Procedure: BIOPSY, LUNG;  Surgeon: [facility] Diagnostic Provider;  Location: [facility] OR;  Service: Radiology;  Laterality: N/A;  9am start--RN PHONE PREOP 4/24----COVID NEGATIVE--pt         FAMILY HISTORY:                Family History   Problem Relation Age of Onset    No Known Problems Mother     No Known Problems Father     No Known Problems Sister     No Known Problems Brother     No Known Problems Maternal Aunt     No Known Problems Maternal Uncle     Blindness Paternal Aunt     No Known Problems Paternal Uncle     No Known Problems Maternal Grandmother     No Known Problems Maternal Grandfather     No Known Problems Paternal Grandmother     No Known Problems Paternal Grandfather     Amblyopia  Neg Hx     Cancer Neg Hx     Cataracts Neg Hx     Diabetes Neg Hx     Glaucoma Neg Hx     Hypertension Neg Hx     Macular degeneration Neg Hx     Retinal detachment Neg Hx     Strabismus Neg Hx     Stroke Neg Hx     Thyroid disease Neg Hx        SOCIAL HISTORY:          Tobacco:   Social History     Tobacco Use   Smoking Status Former Smoker    Packs/day: 0.25    Years: 30.00    Pack years: 7.50    Types: Cigarettes    Last attempt to quit: 3/23/2018    Years since quittin.1   Smokeless Tobacco Never Used     alcohol use:    Social History     Substance and Sexual Activity   Alcohol Use No               Occupation:  [employer] x 42 years    ALLERGIES:  Review of patient's allergies indicates:  No Known Allergies    CURRENT MEDICATIONS:    Current Outpatient Medications   Medication Sig Dispense Refill    albuterol (PROVENTIL/VENTOLIN HFA) 90 mcg/actuation inhaler Inhale 1-2 puffs into the lungs every 6 (six) hours as needed for Wheezing. Rescue (Patient not taking: Reported on 2020) 18 g 0    aspirin (ECOTRIN) 81 MG EC tablet Take 1 tablet (81 mg total) by mouth once daily. (Patient taking differently: Take 81 mg by mouth once daily. )  0    atorvastatin (LIPITOR) 80 MG tablet Take 1 tablet (80 mg total) by mouth once daily. 90 tablet 3    clopidogrel (PLAVIX) 75 mg tablet Take 1 tablet (75 mg total) by mouth once daily. (Patient taking differently: Take 75 mg by mouth once daily. ) 90 tablet 3    docusate sodium (COLACE) 100 MG capsule Take 1 capsule (100 mg total) by mouth 2 (two) times daily as needed for Constipation. (Patient not taking: Reported on 2020) 30 capsule 0    HYDROcodone-acetaminophen (NORCO) 5-325 mg per tablet Take 1 tablet by mouth every 6 (six) hours as needed for Pain. (Patient not taking: Reported on 2020) 10 tablet 0    isosorbide mononitrate (IMDUR) 30 MG 24 hr tablet Take 1 tablet (30 mg total) by mouth once daily. 90 tablet 3    lisinopril  (PRINIVIL,ZESTRIL) 2.5 MG tablet Take 1 tablet (2.5 mg total) by mouth once daily. 90 tablet 3    metoprolol succinate (TOPROL-XL) 50 MG 24 hr tablet Take 1 tablet (50 mg total) by mouth once daily. 90 tablet 3    nitroGLYCERIN (NITROSTAT) 0.4 MG SL tablet Place 1 tablet (0.4 mg total) under the tongue every 5 (five) minutes as needed. (Patient not taking: Reported on 5/8/2020) 25 tablet 3    ofloxacin (OCUFLOX) 0.3 % ophthalmic solution 5 drops in left ear BID x 10 days (Patient not taking: Reported on 5/5/2020) 10 mL 0    omeprazole (PRILOSEC) 20 MG capsule Take 1 capsule (20 mg total) by mouth 2 (two) times daily. (Patient not taking: Reported on 5/8/2020) 28 capsule 0    ondansetron (ZOFRAN) 8 MG tablet Take 1 tablet (8 mg total) by mouth 4 (four) times daily as needed for Nausea. (Patient not taking: Reported on 5/8/2020) 60 tablet 2    promethazine (PHENERGAN) 6.25 mg/5 mL syrup Take 20 mLs (25 mg total) by mouth every 6 (six) hours as needed for Nausea. (Patient not taking: Reported on 5/8/2020) 473 mL 6    promethazine-dextromethorphan (PROMETHAZINE-DM) 6.25-15 mg/5 mL Syrp Take 5 mLs by mouth every 4 to 6 hours as needed. (Patient not taking: Reported on 5/8/2020) 240 mL 0    protein (ENSURE HIGH PROTEIN) Powd Take 1 Can by mouth 3 (three) times daily. 90 Can 3     No current facility-administered medications for this visit.      Facility-Administered Medications Ordered in Other Visits   Medication Dose Route Frequency Provider Last Rate Last Dose    0.9%  NaCl infusion   Intravenous Continuous Cindy Zhao MD 10 mL/hr at 04/20/20 0904      lidocaine (PF) 10 mg/ml (1%) injection 10 mg  1 mL Intradermal Once Cindy Zhao MD                      REVIEW OF SYSTEMS:     Pulmonary related symptoms as per HPI.  Gen:  no weight loss, no fever, no night sweat  HEENT:  no visual changes, no sore throat, no hearing loss  CV:  No chest pain, no orthopnea, no PND  GI:  no melena, no hematochezia,  "no diarhea, no constipation.  :  no dysuria, no hematuria, no hesistancy, no dribbling  Neuro:  no syncope, no vertigo, no tinitus  Psych:  No homocide or suicide ideation; no depression.  Endocrine:  No heat or cold intolerance.  Sleep:  +loud snoring; + witnessed apnea by wife.  Chronic fatigue  Otherwise, a balance of systems reviewed is negative.          PHYSICAL EXAM:  Vitals:    05/11/20 1008   BP: 135/87   Pulse: 73   Resp: 20   SpO2: 99%   Weight: 57.2 kg (126 lb 1.7 oz)   Height: 5' 6" (1.676 m)   PainSc:   5   PainLoc: Ear     Body mass index is 20.35 kg/m².     GENERAL:  well develop; no apparent distress  HEENT:  no nasal congestion; no discharge noted; class 2 modified mallampatti.   NECK:  supple; no palpable masses.  CARDIO: regular rate and rhythm  PULM:  clear to auscultation bilaterally; no intercostals retractions; no accessory muscle usage   ABDOMEN:  soft nontender/nondistended.  +bowel sound  EXTREMITIES no cce  NEURO:  CN II-XII intact.  5/5 motor in all extremities.  sensation grossly intact   to light touch.  PSYCH:  normal affect.  Alert and oriented x 4    LABS  Pulmonary Functions Testing Results(personally reviewed):  none  ABG (personally reviewed):  none  CXR (personally reviewed):  2/28/20 cystic density right lower lung.  Unclear if it was present on 3/23/18 cxr.  CT CHEST(personally reviewed):  none    Echo 2/119  · Moderately decreased left ventricular systolic function. The estimated ejection fraction is 30%  · Eccentric left ventricular hypertrophy.  · Mild left ventricular enlargement.  · Grade I (mild) left ventricular diastolic dysfunction consistent with impaired relaxation.  · Normal right ventricular systolic function.  · Moderate left atrial enlargement.    Ct guided biopsy 4/27/20 rll nodule squamous cell carcinoma  subglotic mass 4/20/20 squamous cell carcinoma.      ASSESSMENT/PLAN  Problem List Items Addressed This Visit     Cavitary lung disease    Overview     rll " cavitary lession.  Possibly present since 2018.  Squamous cancer s/p ct guided biopsy.  afb culture with organism.  Speciation pending.           Current Assessment & Plan     Suspect contamination.  Will repeat sputum afb.  Will monitor with serial imaging.          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         Current Assessment & Plan     Plan for chemo and radiation.                 Patient will Follow up in about 3 months (around 8/11/2020). with md/np.

## 2023-03-07 NOTE — TELEPHONE ENCOUNTER
Caller: [facility]    Provider: MD Wily Enciso    Detailed reason for call: Please call randall with wound care orders:  She received order for 3rd toe. She is wondering if she should do the same for great toe. She has a pinhole wound on the bottom of her right right great toe.  0.1cm x 0.1 cm x 0.2cm.   Treat with honey and roll gauze? Or advise other treatment.     Skilled nursing 1 time per week on weeks 3,5,7,9  Skilled nursing 2 times per week on weeks 1,2,4,6,8      Best phone number to contact: [phone]    Best time to contact: any    Ok to leave a detailed message: Yes

## 2023-03-07 NOTE — TELEPHONE ENCOUNTER
Wound is too small to fill per Yumiko. Informed her OK to apply dry gauze with dressing changes. If any changes to the wound to call back before follow-up on 3/22/23.

## 2023-03-07 NOTE — TELEPHONE ENCOUNTER
Message left, OK given to do same wound care for R great toe wound. Dr. Enciso will evaluate at her appointment on 3/22/23.     OK given for requested skilled nursing visits.     Told to call back with any questions.

## 2023-03-13 ENCOUNTER — TELEPHONE (OUTPATIENT)
Dept: VASCULAR SURGERY | Facility: CLINIC | Age: 83
End: 2023-03-13
Payer: COMMERCIAL

## 2023-03-13 NOTE — TELEPHONE ENCOUNTER
INCOMING EMAIL:     Right 3rd toe is red, swollen and more painful. Not warm to the touch. Foot is throbbing off and on. Some redness also noted on right 5th toe and right great toe, but appears to be from rubbing on post op shoe. Please see attached pictures and call with questions.    María  [phone]

## 2023-03-13 NOTE — TELEPHONE ENCOUNTER
Spoke to María. She does not think toe is infected. She will educate patient to monitor and go to ED if symptoms worsening. Home care will re-assess on Friday. Patient to see Dr. Enciso 3/22.

## 2023-03-17 NOTE — TELEPHONE ENCOUNTER
hCerie with [facility] saw patient today and said the area is still red and there is some pain, but mostly when you touch the area. Patient does not think she will go to the ED unless she starts having more symptoms.   Cherie just wanted to provide an update, but if there are more questions she can be reached any time at: [phone]. OK to LM.

## 2023-03-22 ENCOUNTER — OFFICE VISIT (OUTPATIENT)
Dept: VASCULAR SURGERY | Facility: CLINIC | Age: 83
End: 2023-03-22
Payer: COMMERCIAL

## 2023-03-22 VITALS
WEIGHT: 214 LBS | RESPIRATION RATE: 16 BRPM | DIASTOLIC BLOOD PRESSURE: 80 MMHG | SYSTOLIC BLOOD PRESSURE: 162 MMHG | BODY MASS INDEX: 34.39 KG/M2 | HEART RATE: 96 BPM | HEIGHT: 66 IN

## 2023-03-22 DIAGNOSIS — E11.621 DIABETIC ULCER OF TOE OF RIGHT FOOT ASSOCIATED WITH TYPE 2 DIABETES MELLITUS, LIMITED TO BREAKDOWN OF SKIN (H): ICD-10-CM

## 2023-03-22 DIAGNOSIS — L03.119 CELLULITIS AND ABSCESS OF FOOT EXCLUDING TOE: Primary | ICD-10-CM

## 2023-03-22 DIAGNOSIS — L97.511 DIABETIC ULCER OF TOE OF RIGHT FOOT ASSOCIATED WITH TYPE 2 DIABETES MELLITUS, LIMITED TO BREAKDOWN OF SKIN (H): ICD-10-CM

## 2023-03-22 DIAGNOSIS — L02.619 CELLULITIS AND ABSCESS OF FOOT EXCLUDING TOE: Primary | ICD-10-CM

## 2023-03-22 PROCEDURE — 11042 DBRDMT SUBQ TIS 1ST 20SQCM/<: CPT | Performed by: PODIATRIST

## 2023-03-22 PROCEDURE — 99213 OFFICE O/P EST LOW 20 MIN: CPT | Mod: 25 | Performed by: PODIATRIST

## 2023-03-22 PROCEDURE — G0463 HOSPITAL OUTPT CLINIC VISIT: HCPCS | Mod: 25 | Performed by: PODIATRIST

## 2023-03-22 PROCEDURE — 87077 CULTURE AEROBIC IDENTIFY: CPT | Performed by: PODIATRIST

## 2023-03-22 RX ORDER — CEPHALEXIN 500 MG/1
500 CAPSULE ORAL 3 TIMES DAILY
Qty: 30 CAPSULE | Refills: 0 | Status: SHIPPED | OUTPATIENT
Start: 2023-03-22 | End: 2023-04-26

## 2023-03-22 ASSESSMENT — PAIN SCALES - GENERAL: PAINLEVEL: MILD PAIN (3)

## 2023-03-22 NOTE — PATIENT INSTRUCTIONS
"Important lnstructions  Stop up stairs to get a wheelchair!    Start your antibiotics today!    WEIGHT BEARING STATUS: You are to remain NON WEIGHT BEARING on your right foot. NON WEIGHT BEARING MEANS NO PRESSURE ON YOUR FOOT OR HEEL AT ANY TIME FOR ANY REASON!    2. OFFLOADING DEVICE: Must use a A WHEELCHAIR at all times! (do not use affected foot to push wheelchair)    3. STABILIZATION DEVICE: Use a POST OP SHOE . You will need to WEAR THIS ANYTIME YOU ARE UP AND OUT OF BED, IT IS OKAY TO REMOVE WHEN YOU ARE SLEEPING..       4. ELEVATE: Elevating your leg means laying with your head on a pillow and your foot ABOVE YOUR WAIST.     5. DO NOT MOVE YOUR FOOT.  There is a risk of worsening the wound or incision. To give yourself a higher chance of healing, please DO NOT swing foot back and forth and wiggle foot/toes especially when inside a stabilization device.        Dressing Change lnstructions                 DAILY and as needed    Primary Dressing: Apply dry gauze into/onto the wounds    Secondary dressing: Cover with dry gauze    Secure with non-sterile roll gauze (4\" x 75\" roll) and tape (1\" roll tape) as needed; avoid adhesive directly on the skin    It IS NOT ok to get your wound wet in the bath or shower    SEEK MEDICAL CARE IF:  You have an increase in swelling, pain, or redness around the wound.  You have an increase in the amount of pus coming from the wound.  There is a bad smell coming from the wound.  The wound appears to be worsening/enlarging  You have a fever greater than 101.5 F      It is ok to continue current wound care treatment/products for the next 2-3 days until new wound care supplies are ordered and arrive. If longer than this please contact our office at [phone].        We want to hear from you!   In the next few weeks, you should receive a call or email to complete a survey about your visit at [facility]. Please help us improve your appointment experience by letting " us know how we did today. We strive to make your experience good and value any ways in which we could do better.      We value your input and suggestions.    Thank you for choosing the [facility]!

## 2023-03-22 NOTE — PROGRESS NOTES
FOOT AND ANKLE SURGERY/PODIATRY Progress Note      ASSESSMENT:   Cellulitis right foot  Diabetic ulceration third digit right foot      TREATMENT:  -I discussed with the patient that there is localized infection to the third digit right foot with nonviable tissue which has developed since her previous visit.  We reviewed that ask her excessive walking on the right foot likely lead to further skin breakdown and current presentation.    -Reviewed treatment options to include attempted salvage versus amputation of the third digit.  I recommend hospital admission with transfer to TCU with either scenario to help offload her during the postoperative period.  After discussion patient would like to avoid TCU.  Reviewed risks of continued walking on the right foot including but not limited to recurrent infections, amputation of the third digit, partial foot amputation.  All questions invited and answered.    -Wound culture obtained today.  I will start on Keflex.    -Referred for wheelchair.  Recommend nonweightbearing on the right foot.    -After discussion of risk factors and consent obtained 2% Lidocaine HCL jelly was applied, under clean conditions, the third digit right foot ulceration(s) were debrided using #15 blade scalpel.  Devitalized and nonviable tissue, along with any fibrin and slough, was removed to improve granulation tissue formation, stimulate wound healing, decrease overall bacteria load, disrupt biofilm formation and decrease edge senescence. Wound drainage was scant No. Total excisional debridement was 2 sq cm into the subcutaneous tissue with a depth of 0.3 cm.   Ulcers were improved afterwards and .  Measures were as noted on the flow sheet. A gauze dressing was applied. She will continue to apply a gauze dressing qoday.      Does the patient have mobility limitation significantly impairs his/her ability to participate in one or more mobility-related activities of daily living such as  toileting, dressing, grooming, and bathing in customary locations in the home?  Yes       Can the patient's mobility limitation be sufficiently resolved by the use of an appropriately fitted cane, crutches or walker?  Yes   Patient cannot use a cane, crutches, or walker due to the need for non-weight bearing status.       Does the patient's home provide adequate access between rooms, maneuvering space, and surfaces for the use of a manual wheelchair?  Yes       Is the patient or caregiver able to safely use/maneuver the wheelchair and expressed willingness to use the manual wheelchair in the home on a regular basis?  Yes      Can the patient's functional mobility deficit be sufficiently resolved by the use of a manual wheelchair and significantly improve the patient's ability to participate in mobility-related activities of daily living?  Yes       Does the patient have sufficient upper extremity function and other physical and mental capabilities needed to safely propel the manual wheel chair during a typical day?  Yes      Does the patient have a caregiver who is willing, available, and is able to provide assistance with the wheelchair.  Yes       If a standard jim-wheelchair is ordered does the patient require a lower seat height because of short stature or to enable the patient to place his/her feet on the ground for propulsion?  Yes       If a lightweight wheelchair is ordered is the patient unable to self-propel in a standard weight wheelchair and would be able to self-propel in a lightweight chair?  Yes       Does your patient require height adjustable arms because they require an arm height that is different than that available using nonadjustable arms?  No       Does your patient spend more than 2 hours per day in the wheelchair?  Yes      Does your patient require a safety belt because they have weak upper body muscles, upper body instability, muscle spasticity which requires use of this item for  positioning? Yes        Does the beneficiary self-propel and require anti-rollback devices because of ramps?  No       If the patient is in need of a bariatric wheelchair is their weight over 250 lbs?  No       Does the patient require elevating leg rests?   Yes   Elevating leg rests are needed due to a musculoskeletal condition or presence of a cast or brace which prevents 90 degree flexion at the knee.    -She will follow-up in 7 to 10 days    Wily Enciso DPM  [facility]      HPI: Taylor Sanford was seen again today for sore on the third digit right foot.  She admits to walking at home in a surgical shoe and noticing increasing pain in the toe over the past few days.  She denies any nausea vomiting fever chills.    Past Medical History:   Diagnosis Date     Benign essential hypertension      CAD (coronary artery disease)      Mixed hyperlipidemia      Paroxysmal atrial fibrillation (H)      Type 2 diabetes mellitus with skin complication, without long-term current use of insulin (H)        Past Surgical History:   Procedure Laterality Date     APPENDECTOMY       CARDIAC SURGERY       CHOLECYSTECTOMY         Allergies   Allergen Reactions     Amiodarone      Developed Thyroid toxicity     Sulfonylureas Rash     Amantadine Unknown     Amlodipine Unknown     Sulfa Drugs Other (See Comments)         Current Outpatient Medications:      allopurinol (ZYLOPRIM) 100 MG tablet, Take 200 mg by mouth, Disp: , Rfl:      amLODIPine (NORVASC) 5 MG tablet, amlodipine 5 mg tablet, Disp: , Rfl:      aspirin (ASA) 81 MG chewable tablet, Asprin Ec Low Dose, Disp: , Rfl:      atorvastatin (LIPITOR) 80 MG tablet, Take 80 mg by mouth, Disp: , Rfl:      blood glucose (ACCU-CHEK ADIS PLUS) test strip, 1 strip by In Vitro route daily, Disp: , Rfl:      buPROPion (WELLBUTRIN) 75 MG tablet, One and a half tablets bid, Disp: , Rfl:      Calcium Citrate (CITRACAL OR), Take 1 tablet by mouth daily, Disp: , Rfl:  "     cephALEXin (KEFLEX) 500 MG capsule, Take 1 capsule (500 mg) by mouth 3 times daily, Disp: 30 capsule, Rfl: 0     cholestyramine (QUESTRAN) 4 g packet, , Disp: , Rfl:      dulaglutide (TRULICITY) 0.75 MG/0.5ML pen, Inject 0.75 mg Subcutaneous every 7 days, Disp: 2 mL, Rfl: 3     furosemide (LASIX) 20 MG tablet, 40 mg daily, Disp: , Rfl:      gabapentin (NEURONTIN) 300 MG capsule, 300 mg 2 times daily, Disp: , Rfl:      glipiZIDE (GLUCOTROL XL) 10 MG 24 hr tablet, Take 10 mg by mouth 2 times daily, Disp: , Rfl:      hyoscyamine (LEVSIN) 0.125 MG tablet, Take 0.125 mg by mouth, Disp: , Rfl:      lisinopril (ZESTRIL) 5 MG tablet, 5 mg daily, Disp: , Rfl:      magnesium 250 MG tablet, 2 tablets 2 times daily, Disp: , Rfl:      metoprolol succinate ER (TOPROL-XL) 50 MG 24 hr tablet, 150 mg, Disp: , Rfl:      nitroFURantoin macrocrystal-monohydrate (MACROBID) 100 MG capsule, Take 100 mg by mouth daily, Disp: , Rfl:      omeprazole (PRILOSEC) 20 MG DR capsule, Take 20 mg by mouth daily, Disp: , Rfl:      sodium fluoride dental gel (PREVIDENT) 1.1 % GEL topical gel, , Disp: , Rfl:      trospium (SANCTURA) 20 MG tablet, , Disp: , Rfl:      Vibegron (GEMTESA) 75 MG TABS tablet, , Disp: , Rfl:      warfarin ANTICOAGULANT (COUMADIN) 2 MG tablet, warfarin 2 mg tablet, Disp: , Rfl:      Calcium Carb-Cholecalciferol (CALCIUM CARBONATE-VITAMIN D3) 600-400 MG-UNIT TABS, Take 1 tablet by mouth 2 times daily (Patient not taking: Reported on 1/25/2023), Disp: , Rfl:      cilostazol (PLETAL) 100 MG tablet, Take 100 mg by mouth 2 times daily, Disp: , Rfl:     Review of Systems - 10 point Review of Systems is negative except for ulceration third digit right foot which is noted in HPI.      OBJECTIVE:  BP (!) 162/80   Pulse 96   Resp 16   Ht 5' 6\" (1.676 m)   Wt 214 lb (97.1 kg)   BMI 34.54 kg/m    General appearance: Patient is alert and fully cooperative with history & exam.  No sign of distress is noted during the " visit.    Vascular: Dorsalis pedis palpableRight.  Dermatologic:    VASC Wound RIGHT HALLUX (Active)   Post Size Length 0 12/09/22 0800   Post Size Width 0 12/09/22 0800   Post Size Depth 0 12/09/22 0800   Post Total Sq cm 0 12/09/22 0800   Description callous 03/22/23 0930       VASC Wound right tthird toe (Active)   Pre Size Length 1 03/22/23 0930   Pre Size Width 2 03/22/23 0930   Pre Size Depth 0.3 03/22/23 0930   Pre Total Sq cm 2 03/22/23 0930   Ulceration distal third digit right foot has increased depth with nonviable tissue, does not probe to bone.  Localized erythema about the third digit without asending cellulitis.  No fluctuance noted.  Neurologic: Diminished to light touch Right.  Musculoskeletal: Rigid contracture third digit right foot.      Picture:

## 2023-03-27 ENCOUNTER — TELEPHONE (OUTPATIENT)
Dept: VASCULAR SURGERY | Facility: CLINIC | Age: 83
End: 2023-03-27
Payer: COMMERCIAL

## 2023-03-27 DIAGNOSIS — A49.02 MRSA INFECTION: ICD-10-CM

## 2023-03-27 DIAGNOSIS — L97.511 DIABETIC ULCER OF TOE OF RIGHT FOOT ASSOCIATED WITH TYPE 2 DIABETES MELLITUS, LIMITED TO BREAKDOWN OF SKIN (H): ICD-10-CM

## 2023-03-27 DIAGNOSIS — L03.119 CELLULITIS AND ABSCESS OF FOOT EXCLUDING TOE: Primary | ICD-10-CM

## 2023-03-27 DIAGNOSIS — L02.619 CELLULITIS AND ABSCESS OF FOOT EXCLUDING TOE: Primary | ICD-10-CM

## 2023-03-27 DIAGNOSIS — E11.621 DIABETIC ULCER OF TOE OF RIGHT FOOT ASSOCIATED WITH TYPE 2 DIABETES MELLITUS, LIMITED TO BREAKDOWN OF SKIN (H): ICD-10-CM

## 2023-03-27 LAB
BACTERIA WND CULT: ABNORMAL

## 2023-03-27 RX ORDER — MUPIROCIN 20 MG/G
OINTMENT TOPICAL DAILY
Qty: 30 G | Refills: 3 | Status: SHIPPED | OUTPATIENT
Start: 2023-03-27

## 2023-03-27 RX ORDER — DOXYCYCLINE 100 MG/1
100 CAPSULE ORAL 2 TIMES DAILY
Qty: 20 CAPSULE | Refills: 0 | Status: SHIPPED | OUTPATIENT
Start: 2023-03-27 | End: 2023-04-06

## 2023-03-27 NOTE — PROGRESS NOTES
Was asked to review culture results for this patient while provider is out of town; culture grew out MRSA, Trueperella; and actinotignum; pt had previously been started on keflex; will add doxycyline for mrsa coverage 100mg BID for 10 days; allergy to sulfa; she is on coumadin will need INR in 3-4 days after starting antibiotics; should be managed by the anticoagulation clinic; continue on keflex; will also add topical mupirocin for mrsa coverage; continue other dressing and frequency per Dr. Enciso; notified the charge nurse of the changes and she will update the patient and care givers. Pt will follow up with Dr. Enciso in 1 week/

## 2023-03-27 NOTE — TELEPHONE ENCOUNTER
Spoke to María the nurse at [facility]. Let her know the new wound care orders were faxed over. María has no further questions.

## 2023-03-27 NOTE — TELEPHONE ENCOUNTER
Caller: María    Provider: MD Wily Enciso    Detailed reason for call: María is calling requesting updated orders for Taylor, she is a her home care RN with [facility]    Best phone number to contact: [phone]    Best time to contact: any    Ok to leave a detailed message: Yes    Ok to speak to authorized person if needed: No      (Noted to patient if reason is related to wound or incision, to please send a photo via email or [employer].)

## 2023-03-27 NOTE — PROGRESS NOTES
Lynnette Durán, Tashi Santa, RN  I will add doxycyline to her regimen to cover the MRSA take 100mg BID for 10 days; avoid dairy products; avoid sun exposure     She can continue on keflex as well     She is on coumadin will need INR 3-4 days after initiation of antibiotics and manage through coag clinic     I have also added Mupirocin ointment to be applied to toe wounds daily with gauze dressings that Fernie has ordered     I sent prescriptions to [facility]     Can you update the wound care instructions and let her facility or care givers know???       LK         Writer called and notified patient. Will fax updated orders to [facility]. Patient is seeing Dr. Enciso again in 4 days. She understands instructions to get the antibiotics started.

## 2023-03-31 ENCOUNTER — OFFICE VISIT (OUTPATIENT)
Dept: VASCULAR SURGERY | Facility: CLINIC | Age: 83
End: 2023-03-31
Attending: PODIATRIST
Payer: COMMERCIAL

## 2023-03-31 VITALS — HEIGHT: 66 IN | HEART RATE: 104 BPM | WEIGHT: 214 LBS | BODY MASS INDEX: 34.39 KG/M2 | OXYGEN SATURATION: 97 %

## 2023-03-31 DIAGNOSIS — L97.511 DIABETIC ULCER OF TOE OF RIGHT FOOT ASSOCIATED WITH TYPE 2 DIABETES MELLITUS, LIMITED TO BREAKDOWN OF SKIN (H): Primary | ICD-10-CM

## 2023-03-31 DIAGNOSIS — E11.621 DIABETIC ULCER OF TOE OF RIGHT FOOT ASSOCIATED WITH TYPE 2 DIABETES MELLITUS, LIMITED TO BREAKDOWN OF SKIN (H): Primary | ICD-10-CM

## 2023-03-31 PROCEDURE — 97597 DBRDMT OPN WND 1ST 20 CM/<: CPT | Performed by: PODIATRIST

## 2023-03-31 RX ORDER — GABAPENTIN 300 MG/1
1 CAPSULE ORAL 3 TIMES DAILY
COMMUNITY
Start: 2023-03-27

## 2023-03-31 ASSESSMENT — PAIN SCALES - GENERAL: PAINLEVEL: NO PAIN (0)

## 2023-03-31 NOTE — PROGRESS NOTES
FOOT AND ANKLE SURGERY/PODIATRY Progress Note      ASSESSMENT:   Diabetic ulceration third digit right foot  [place]      TREATMENT:  -Discussed with the patient that the ulceration on the third digit right foot is improved from her previous visit.  Also no signs of infection on exam today.    -I recommend she continue with the mupirocin ointment and finish current course of Keflex.    -Recommend nonweightbearing on the right foot.    -After discussion of risk factors and consent obtained 2% Lidocaine HCL jelly was applied, under clean conditions, the third digit right foot ulceration(s) were debrided using #15 blade scalpel.  Devitalized and nonviable tissue, along with any fibrin and slough, was removed to improve granulation tissue formation, stimulate wound healing, decrease overall bacteria load, disrupt biofilm formation and decrease edge senescence. Wound drainage was scant No. Total excisional debridement was 0.06 sq cm from the epidermis/dermis area with a depth of 0.1 cm.   Ulcers were improved afterwards and .  Measures were as noted on the flow sheet.  Mupirocin ointment with gauze dressing applied today which she will continue to apply as directed.    -She will follow-up in 3 weeks    Wily Enciso DPM  [facility] [place]      HPI: Taylor Sanford was seen again today for a sore in the third digit right foot.  She has tried to remain limited walking on the right foot with a surgical shoe.    Past Medical History:   Diagnosis Date     Benign essential hypertension      CAD (coronary artery disease)      Mixed hyperlipidemia      Paroxysmal atrial fibrillation (H)      Type 2 diabetes mellitus with skin complication, without long-term current use of insulin (H)        Past Surgical History:   Procedure Laterality Date     APPENDECTOMY       CARDIAC SURGERY       CHOLECYSTECTOMY         Allergies   Allergen Reactions     Amiodarone      Developed Thyroid toxicity      Sulfonylureas Rash     Amantadine Unknown     Amlodipine Unknown     Sulfa Drugs Other (See Comments)         Current Outpatient Medications:      allopurinol (ZYLOPRIM) 100 MG tablet, Take 200 mg by mouth, Disp: , Rfl:      amLODIPine (NORVASC) 5 MG tablet, amlodipine 5 mg tablet, Disp: , Rfl:      aspirin (ASA) 81 MG chewable tablet, Asprin Ec Low Dose, Disp: , Rfl:      atorvastatin (LIPITOR) 80 MG tablet, Take 80 mg by mouth, Disp: , Rfl:      blood glucose (ACCU-CHEK ADIS PLUS) test strip, 1 strip by In Vitro route daily, Disp: , Rfl:      buPROPion (WELLBUTRIN) 75 MG tablet, One and a half tablets bid, Disp: , Rfl:      Calcium Carb-Cholecalciferol (CALCIUM CARBONATE-VITAMIN D3) 600-400 MG-UNIT TABS, Take 1 tablet by mouth 2 times daily, Disp: , Rfl:      Calcium Citrate (CITRACAL OR), Take 1 tablet by mouth daily, Disp: , Rfl:      cephALEXin (KEFLEX) 500 MG capsule, Take 1 capsule (500 mg) by mouth 3 times daily, Disp: 30 capsule, Rfl: 0     cholestyramine (QUESTRAN) 4 g packet, , Disp: , Rfl:      doxycycline monohydrate (MONODOX) 100 MG capsule, Take 1 capsule (100 mg) by mouth 2 times daily for 10 days, Disp: 20 capsule, Rfl: 0     dulaglutide (TRULICITY) 0.75 MG/0.5ML pen, Inject 0.75 mg Subcutaneous every 7 days, Disp: 2 mL, Rfl: 3     furosemide (LASIX) 20 MG tablet, 40 mg daily, Disp: , Rfl:      gabapentin (NEURONTIN) 300 MG capsule, Take 1 capsule by mouth 3 times daily, Disp: , Rfl:      gabapentin (NEURONTIN) 300 MG capsule, 300 mg 2 times daily, Disp: , Rfl:      glipiZIDE (GLUCOTROL XL) 10 MG 24 hr tablet, Take 10 mg by mouth 2 times daily, Disp: , Rfl:      hyoscyamine (LEVSIN) 0.125 MG tablet, Take 0.125 mg by mouth, Disp: , Rfl:      lisinopril (ZESTRIL) 5 MG tablet, 5 mg daily, Disp: , Rfl:      magnesium 250 MG tablet, 2 tablets 2 times daily, Disp: , Rfl:      metoprolol succinate ER (TOPROL-XL) 50 MG 24 hr tablet, 150 mg, Disp: , Rfl:      mupirocin (BACTROBAN) 2 % external ointment,  "Apply topically daily, Disp: 30 g, Rfl: 3     nitroFURantoin macrocrystal-monohydrate (MACROBID) 100 MG capsule, Take 100 mg by mouth daily, Disp: , Rfl:      omeprazole (PRILOSEC) 20 MG DR capsule, Take 20 mg by mouth daily, Disp: , Rfl:      sodium fluoride dental gel (PREVIDENT) 1.1 % GEL topical gel, , Disp: , Rfl:      trospium (SANCTURA) 20 MG tablet, , Disp: , Rfl:      Vibegron (GEMTESA) 75 MG TABS tablet, , Disp: , Rfl:      warfarin ANTICOAGULANT (COUMADIN) 2 MG tablet, warfarin 2 mg tablet, Disp: , Rfl:      cilostazol (PLETAL) 100 MG tablet, Take 100 mg by mouth 2 times daily, Disp: , Rfl:     Review of Systems - 10 point Review of Systems is negative except for ulceration third digit right foot which is noted in HPI.      OBJECTIVE:  Pulse 104   Ht 5' 6\" (1.676 m)   Wt 214 lb (97.1 kg)   SpO2 97%   BMI 34.54 kg/m    General appearance: Patient is alert and fully cooperative with history & exam.  No sign of distress is noted during the visit.    Vascular: Dorsalis pedis non-palpableRight.  Dermatologic:    VASC Wound RIGHT HALLUX (Active)   Post Size Length 0 12/09/22 0800   Post Size Width 0 12/09/22 0800   Post Size Depth 0 12/09/22 0800   Post Total Sq cm 0 12/09/22 0800   Description callous 03/31/23 0900       VASC Wound right tthird toe (Active)   Pre Size Length 1 03/22/23 0930   Pre Size Width 2 03/22/23 0930   Pre Size Depth 0.3 03/22/23 0930   Pre Total Sq cm 2 03/22/23 0930   Post Size Length 0.3 03/31/23 0900   Post Size Width 0.2 03/31/23 0900   Post Size Depth 0.1 03/31/23 0900   Post Total Sq cm 0.06 03/31/23 0900   Description callous 03/31/23 0900   Granular base distal third digit right foot ulceration, no erythema.  Neurologic: Diminished to light touch Right.  Musculoskeletal: Contracted digits noted Right.    Imaging:     No results found.       Picture:         "

## 2023-03-31 NOTE — PATIENT INSTRUCTIONS
"Important lnstructions  Stop up stairs to get a wheelchair!     Start your antibiotics today!     WEIGHT BEARING STATUS: You are to remain NON WEIGHT BEARING on your right foot. NON WEIGHT BEARING MEANS NO PRESSURE ON YOUR FOOT OR HEEL AT ANY TIME FOR ANY REASON!     2. OFFLOADING DEVICE: Must use a A WHEELCHAIR at all times! (do not use affected foot to push wheelchair)     3. STABILIZATION DEVICE: Use a POST OP SHOE . You will need to WEAR THIS ANYTIME YOU ARE UP AND OUT OF BED, IT IS OKAY TO REMOVE WHEN YOU ARE SLEEPING..         4. ELEVATE: Elevating your leg means laying with your head on a pillow and your foot ABOVE YOUR WAIST.      5. DO NOT MOVE YOUR FOOT.  There is a risk of worsening the wound or incision. To give yourself a higher chance of healing, please DO NOT swing foot back and forth and wiggle foot/toes especially when inside a stabilization device.          Dressing Change lnstructions                                                                                                           DAILY and as needed     Primary Dressing: Apply dry gauze into/onto the wounds     Secondary dressing: Cover with dry gauze     Secure with non-sterile roll gauze (4\" x 75\" roll) and tape (1\" roll tape) as needed; avoid adhesive directly on the skin     It IS NOT ok to get your wound wet in the bath or shower     SEEK MEDICAL CARE IF:  You have an increase in swelling, pain, or redness around the wound.  You have an increase in the amount of pus coming from the wound.  There is a bad smell coming from the wound.  The wound appears to be worsening/enlarging  You have a fever greater than 101.5 F        It is ok to continue current wound care treatment/products for the next 2-3 days until new wound care supplies are ordered and arrive. If longer than this please contact our office at [phone].         We want to hear from you!   In the next few weeks, you should receive a call or email to complete a survey " about your visit at [facility]. Please help us improve your appointment experience by letting us know how we did today. We strive to make your experience good and value any ways in which we could do better.       We value your input and suggestions.     Thank you for choosing the [facility]!

## 2023-04-20 ENCOUNTER — LAB (OUTPATIENT)
Dept: LAB | Facility: CLINIC | Age: 83
End: 2023-04-20
Payer: COMMERCIAL

## 2023-04-20 DIAGNOSIS — E11.65 TYPE 2 DIABETES MELLITUS WITH HYPERGLYCEMIA, WITHOUT LONG-TERM CURRENT USE OF INSULIN (H): ICD-10-CM

## 2023-04-20 LAB
ANION GAP SERPL CALCULATED.3IONS-SCNC: 10 MMOL/L (ref 7–15)
BUN SERPL-MCNC: 24.5 MG/DL (ref 8–23)
CALCIUM SERPL-MCNC: 9.5 MG/DL (ref 8.8–10.2)
CHLORIDE SERPL-SCNC: 99 MMOL/L (ref 98–107)
CREAT SERPL-MCNC: 1.5 MG/DL (ref 0.51–0.95)
DEPRECATED HCO3 PLAS-SCNC: 26 MMOL/L (ref 22–29)
GFR SERPL CREATININE-BSD FRML MDRD: 35 ML/MIN/1.73M2
GLUCOSE SERPL-MCNC: 158 MG/DL (ref 70–99)
HBA1C MFR BLD: 6.5 % (ref 0–5.6)
POTASSIUM SERPL-SCNC: 4.6 MMOL/L (ref 3.4–5.3)
SODIUM SERPL-SCNC: 135 MMOL/L (ref 136–145)

## 2023-04-20 PROCEDURE — 83036 HEMOGLOBIN GLYCOSYLATED A1C: CPT

## 2023-04-20 PROCEDURE — 36415 COLL VENOUS BLD VENIPUNCTURE: CPT

## 2023-04-20 PROCEDURE — 80048 BASIC METABOLIC PNL TOTAL CA: CPT

## 2023-04-21 ENCOUNTER — OFFICE VISIT (OUTPATIENT)
Dept: VASCULAR SURGERY | Facility: CLINIC | Age: 83
End: 2023-04-21
Attending: PODIATRIST
Payer: COMMERCIAL

## 2023-04-21 VITALS
SYSTOLIC BLOOD PRESSURE: 120 MMHG | DIASTOLIC BLOOD PRESSURE: 60 MMHG | HEART RATE: 88 BPM | RESPIRATION RATE: 18 BRPM | TEMPERATURE: 97.5 F

## 2023-04-21 DIAGNOSIS — M20.41 HAMMERTOE OF RIGHT FOOT: ICD-10-CM

## 2023-04-21 DIAGNOSIS — E11.621 DIABETIC ULCER OF TOE OF RIGHT FOOT ASSOCIATED WITH TYPE 2 DIABETES MELLITUS, LIMITED TO BREAKDOWN OF SKIN (H): Primary | ICD-10-CM

## 2023-04-21 DIAGNOSIS — L97.511 DIABETIC ULCER OF TOE OF RIGHT FOOT ASSOCIATED WITH TYPE 2 DIABETES MELLITUS, LIMITED TO BREAKDOWN OF SKIN (H): Primary | ICD-10-CM

## 2023-04-21 PROCEDURE — G0463 HOSPITAL OUTPT CLINIC VISIT: HCPCS | Performed by: PODIATRIST

## 2023-04-21 PROCEDURE — 99212 OFFICE O/P EST SF 10 MIN: CPT | Performed by: PODIATRIST

## 2023-04-21 ASSESSMENT — PAIN SCALES - GENERAL: PAINLEVEL: NO PAIN (0)

## 2023-04-21 NOTE — PROGRESS NOTES
FOOT AND ANKLE SURGERY/PODIATRY Progress Note      ASSESSMENT:   Diabetic ulceration third digit right foot  [place]      TREATMENT:  -Discussed with the patient that the ulceration on the third digit right foot has resolved.  I am pleased with her progress.    -We discussed that due to her anatomy, there is a pressure point which will continue to build callus tissue. If the callus tissue becomes too thick, skin breakdown will likely occur.     -I recommend use of a pumice stone x2-3 per week to remove callus tissue. I have asked that she return for sharp debridement of the callus prn.     -Also referred for a crest pad and gel sleeve to be used during the day to offload the distal digits.    -I have referred her to [facility] for diabetic shoes and inserts to offload the area of increased skin pressure.  She will continue to use the CAM boot until the diabetic inserts/shoes are available.     -She is discharged from my care at this time but encouraged to return as concerns develop.     Wily Enciso DPM  [facility] [place]      HPI: Taylor HOLLY Juvenal was seen again today for a sore on the third digit right foot.  She has remained limited walking while in a surgical shoe and denies new concerns.    Past Medical History:   Diagnosis Date     Benign essential hypertension      CAD (coronary artery disease)      Mixed hyperlipidemia      Paroxysmal atrial fibrillation (H)      Type 2 diabetes mellitus with skin complication, without long-term current use of insulin (H)        Past Surgical History:   Procedure Laterality Date     APPENDECTOMY       CARDIAC SURGERY       CHOLECYSTECTOMY         Allergies   Allergen Reactions     Amiodarone      Developed Thyroid toxicity     Sulfonylureas Rash     Amantadine Unknown     Amlodipine Unknown     Sulfa Drugs Other (See Comments)         Current Outpatient Medications:      allopurinol (ZYLOPRIM) 100 MG tablet, Take 200 mg by mouth, Disp: , Rfl:       amLODIPine (NORVASC) 5 MG tablet, amlodipine 5 mg tablet, Disp: , Rfl:      aspirin (ASA) 81 MG chewable tablet, Asprin Ec Low Dose, Disp: , Rfl:      atorvastatin (LIPITOR) 80 MG tablet, Take 80 mg by mouth, Disp: , Rfl:      blood glucose (ACCU-CHEK ADIS PLUS) test strip, 1 strip by In Vitro route daily, Disp: , Rfl:      buPROPion (WELLBUTRIN) 75 MG tablet, One and a half tablets bid, Disp: , Rfl:      Calcium Carb-Cholecalciferol (CALCIUM CARBONATE-VITAMIN D3) 600-400 MG-UNIT TABS, Take 1 tablet by mouth 2 times daily, Disp: , Rfl:      Calcium Citrate (CITRACAL OR), Take 1 tablet by mouth daily, Disp: , Rfl:      cephALEXin (KEFLEX) 500 MG capsule, Take 1 capsule (500 mg) by mouth 3 times daily, Disp: 30 capsule, Rfl: 0     cholestyramine (QUESTRAN) 4 g packet, , Disp: , Rfl:      dulaglutide (TRULICITY) 0.75 MG/0.5ML pen, Inject 0.75 mg Subcutaneous every 7 days, Disp: 2 mL, Rfl: 3     furosemide (LASIX) 20 MG tablet, 40 mg daily, Disp: , Rfl:      gabapentin (NEURONTIN) 300 MG capsule, Take 1 capsule by mouth 3 times daily, Disp: , Rfl:      gabapentin (NEURONTIN) 300 MG capsule, 300 mg 2 times daily, Disp: , Rfl:      glipiZIDE (GLUCOTROL XL) 10 MG 24 hr tablet, Take 10 mg by mouth 2 times daily, Disp: , Rfl:      hyoscyamine (LEVSIN) 0.125 MG tablet, Take 0.125 mg by mouth, Disp: , Rfl:      lisinopril (ZESTRIL) 5 MG tablet, 5 mg daily, Disp: , Rfl:      magnesium 250 MG tablet, 2 tablets 2 times daily, Disp: , Rfl:      metoprolol succinate ER (TOPROL-XL) 50 MG 24 hr tablet, 150 mg, Disp: , Rfl:      mupirocin (BACTROBAN) 2 % external ointment, Apply topically daily, Disp: 30 g, Rfl: 3     nitroFURantoin macrocrystal-monohydrate (MACROBID) 100 MG capsule, Take 100 mg by mouth daily, Disp: , Rfl:      omeprazole (PRILOSEC) 20 MG DR capsule, Take 20 mg by mouth daily, Disp: , Rfl:      sodium fluoride dental gel (PREVIDENT) 1.1 % GEL topical gel, , Disp: , Rfl:      trospium (SANCTURA) 20 MG tablet, , Disp:  , Rfl:      Vibegron (GEMTESA) 75 MG TABS tablet, , Disp: , Rfl:      warfarin ANTICOAGULANT (COUMADIN) 2 MG tablet, warfarin 2 mg tablet, Disp: , Rfl:      cilostazol (PLETAL) 100 MG tablet, Take 100 mg by mouth 2 times daily, Disp: , Rfl:     Review of Systems - 10 point Review of Systems is negative except for third digit right foot ulcer which is noted in HPI.      OBJECTIVE:  /60   Pulse 88   Temp 97.5  F (36.4  C)   Resp 18   General appearance: Patient is alert and fully cooperative with history & exam.  No sign of distress is noted during the visit.    Vascular: Dorsalis pedis non-palpableRight.  Dermatologic:    VASC Wound right tthird toe (Active)   Pre Size Length 0.6 04/21/23 0900   Pre Size Width 1 04/21/23 0900   Pre Size Depth 0.3 03/22/23 0930   Pre Total Sq cm 2 03/22/23 0930   Post Size Length 0 04/21/23 0900   Post Size Width 0 04/21/23 0900   Post Size Depth 0 04/21/23 0900   Post Total Sq cm 0 04/21/23 0900   Description callous 04/21/23 0900     Neurologic: Diminished to light touch Right.  Musculoskeletal: Contracted digits noted Right.    Imaging:     No results found.       Picture:

## 2023-04-21 NOTE — PATIENT INSTRUCTIONS
Go upstairs for crest pad, gel sleeve - apply in morning and remove at night.   Below is also a list of nail clinics    Your doctor recommends you use a pumice stone to get rid of your callous. You can purchase a pumice stone at your local drug store.    How to use your Pumice Stone        1. Soak your calloused skin in warm water. The most common part of the body to exfoliate with a pumice stone is the feet. Heels tend to develop a layer of hard, calloused skin that can become cracked or scaled. Soak the calloused body part in warm water for about five minutes to soften the skin.    2. Wait until your dry skin has softened. The skin will be easier to remove if it's soft and supple. Feel your skin after several minutes of soaking. If it still feels tough, wait a few more minutes (giving the water a warm-up if necessary). If it's soft, your skin is ready for the pumice stone.     3. Wet the stone. Wetting the stone will help it slide more easily across your skin, rather than catching on it. Run the stone under warm water, or dip it in the water where you're soaking your skin, in order to thoroughly wet it.    4. Rub it gently over the calloused area. Use a circular motion to start sloughing away the dead skin with the pumice stone. If the skin is nice and soft, it should start coming right off. Keep going until you remove the dead skin and get to the fresh, supple skin underneath.   Don't press too hard. Light pressure is all that is needed; let the surface of the stone do the work.   If you're working on your feet, focus on the heels, the sides of your toes, and other areas where dry skin tends to build up.    5. Rinse and repeat. Rinse off the dead skin and take a look to see if you need to keep going. If you still see bits of dead skin, go over the area again with the pumice stone. Continue using the stone on the area until you're satisfied with the results.   Since the pumice stone will wear down slightly while  you use it, you may need to turn it over to get a fresh surface you can use to exfoliate your skin.   Rinse the pumice stone often to keep its surface clean and effective.    6. Dry and moisturize your skin. When you're finished, use a towel to pat your skin dry. Coat the area with an oil or cream to prevent it from drying out too quickly. Your formerly calloused skin should now be soft, supple and gleaming.   Coconut oil, almond oil, or body lotion are all fine to use to condition your skin after pumicing.   Repeat as often as needed to keep your skin in good shape.    CARING FOR YOUR PUMICE STONE:    1. Scrub it after use. Dead skin will build up in the pores of the stone as you use it, so you'll want to clean the stone after use. Use a scrub brush to scrub the stone while holding it under running water. Add a bit of soap to help clean the stone completely. This way your stone will be clean and ready to use next time you need it.     2. Allow it to completely dry out. Set the pumice stone in a dry place so that it doesn't stay damp in between uses. Some pumice stones come with a string attached that allows you to hang the stone to dry. If you let the stone stay wet, bacteria could grow in the pores, making it unsafe to use.     3. Boil it if necessary. Every once in a while, you'll want to give the stone a deep cleaning to make sure it isn't harboring bacteria. Bring a small pot of water to a full boil, drop in the stone, and boil it for five minutes. Use tongs to remove the stone from the water and allow it to dry completely before storing.   If you use the stone frequently, boil it every two weeks to ensure it stays clean.   If you'd like, you can add a capful of bleach to the water to be certain all the bacteria is killed.    4. Replace the stone when it wears down. Pumice is a soft stone that will eventually wear away after you've used it for awhile. When it gets too small to handle easily, or the surface  becomes too smooth to be effective, purchase a new one. Pumice stones are inexpensive and can be found at any store that sells beauty supplies.     Toenail and Foot Care Providers  [facility]  [facility]  [phone]    [facility], [facility]  [phone]    [facility]  [place]  [phone]    [facility]  [facility] [place]  [phone]    [facility]  [phone]    [facility]  [phone]    [facility]  They have several locations and have a team of registered nurses that offer diabetic foot care.  They do not bill to insurance and the average cost per visit is $37.  [place] locations include:  [place], [place], [facility]  [phone]    [employer] - Phone [phone]  [place] (near [address])  Pedicure $24    [facility] - Phone [phone]  Pedicure $15

## 2023-04-26 ENCOUNTER — OFFICE VISIT (OUTPATIENT)
Dept: ENDOCRINOLOGY | Facility: CLINIC | Age: 83
End: 2023-04-26
Payer: COMMERCIAL

## 2023-04-26 VITALS
SYSTOLIC BLOOD PRESSURE: 110 MMHG | HEART RATE: 80 BPM | BODY MASS INDEX: 33.89 KG/M2 | WEIGHT: 210 LBS | DIASTOLIC BLOOD PRESSURE: 58 MMHG

## 2023-04-26 DIAGNOSIS — E11.65 TYPE 2 DIABETES MELLITUS WITH HYPERGLYCEMIA, WITHOUT LONG-TERM CURRENT USE OF INSULIN (H): Primary | ICD-10-CM

## 2023-04-26 PROCEDURE — 99214 OFFICE O/P EST MOD 30 MIN: CPT | Performed by: INTERNAL MEDICINE

## 2023-04-26 RX ORDER — GLIPIZIDE 10 MG/1
10 TABLET, FILM COATED, EXTENDED RELEASE ORAL DAILY
Qty: 60 TABLET | Refills: 4 | Status: SHIPPED | OUTPATIENT
Start: 2023-04-26 | End: 2023-06-02

## 2023-04-26 NOTE — LETTER
4/26/2023         RE: Taylor Sanford  [address] [id number]        Dear Colleague,    Thank you for referring your patient, Taylor Sanford, to the [facility]. Please see a copy of my visit note below.      ENDOCRINOLOGY FOLLOW-UP         HISTORY OF PRESENT ILLNESS    Taylor Sanford is seen in follow-up for the issues outlined below.     We started Trulicity and discontinued Tradjenta last visit in 1/2023.  Patient confirms she has made this change and has been tolerating Trulicity without side effect.    Current diabetes regimen: Trulicity 0.75 mg weekly, glipizide XL 10 mg twice daily.    Checking fingerstick glucose mostly once a day in the morning, rarely in the latter part of the day around noon time.  Average glucose 119 over the past 2 weeks, no documented hypoglycemia.  She does recall an episode of hypoglycemia with blood glucose of 54 in the interim since last visit.    Has been following with Dr. Enciso and podiatry: Ulceration in the third digit on the right foot had resolved at the time of follow-up earlier this month.  He recommended ongoing foot care/precautions and follow-up in podiatry as needed.    Pertinent endocrine and related history:  1.  Diabetes mellitus, type 2.  Diagnosed approximately in the late 1990s.  -Metformin had to be discontinued due to change in kidney function.  2. History of amiodarone-induced thyroiditis. 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 Thyroid ultrasound 2011 showed heterogenous thyroid parenchyma: two subcentimeter thyroid nodules were noted, one in left and another in right lobe.  -Most recent thyroid US 9/24/2013 showed the following:  Right lobe spongiform 0.4 x 0.3  x 0.4 cm nodule in the midpole, previously measuring 0.3 x 0.2 cm.   Left  lobe isoechoic nodule in the lower pole measuring 0.6 x 0.5 x 0.5 cm unchanged with little vascularity.   3. Chronic kidney disease.  4.  Osteoporosis.  Noted on DXA scan performed in the [employer] system in 2020.  History of right hip fracture after falling from standing height.  Has preferred to have osteoporosis managed by orthopedics and PCP.  5.  Atrial fibrillation.  6.  Coronary artery disease, status post PCI in 2002.  7.  Hyperlipidemia.  8.  Hypertension.  9.  Depression.    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  Had 2 children, a son and a daughter; her daughter passed away.    PAST MEDICAL HISTORY  Past Medical History:   Diagnosis Date     Benign essential hypertension      CAD (coronary artery disease)      Mixed hyperlipidemia      Paroxysmal atrial fibrillation (H)      Type 2 diabetes mellitus with skin complication, without long-term current use of insulin (H)        MEDICATIONS  Current Outpatient Medications   Medication Sig Dispense Refill     allopurinol (ZYLOPRIM) 100 MG tablet Take 200 mg by mouth       amLODIPine (NORVASC) 5 MG tablet amlodipine 5 mg tablet       aspirin (ASA) 81 MG chewable tablet Asprin Ec Low Dose       atorvastatin (LIPITOR) 80 MG tablet Take 80 mg by mouth       blood glucose (ACCU-CHEK ADIS PLUS) test strip 1 strip by In Vitro route daily       buPROPion (WELLBUTRIN) 75 MG tablet One and a half tablets bid       Calcium Carb-Cholecalciferol (CALCIUM CARBONATE-VITAMIN D3) 600-400 MG-UNIT TABS Take 1 tablet by mouth 2 times daily       Calcium Citrate (CITRACAL OR) Take 1 tablet by mouth daily       cephALEXin (KEFLEX) 500 MG capsule Take 1 capsule (500 mg) by mouth 3 times daily 30 capsule 0     cholestyramine (QUESTRAN) 4 g packet        cilostazol (PLETAL) 100 MG tablet Take 100 mg by mouth 2 times daily       dulaglutide (TRULICITY) 0.75 MG/0.5ML pen Inject 0.75 mg Subcutaneous every 7 days 2  mL 3     furosemide (LASIX) 20 MG tablet 40 mg daily       gabapentin (NEURONTIN) 300 MG capsule Take 1 capsule by mouth 3 times daily       gabapentin (NEURONTIN) 300 MG capsule 300 mg 2 times daily       glipiZIDE (GLUCOTROL XL) 10 MG 24 hr tablet Take 10 mg by mouth 2 times daily       hyoscyamine (LEVSIN) 0.125 MG tablet Take 0.125 mg by mouth       lisinopril (ZESTRIL) 5 MG tablet 5 mg daily       magnesium 250 MG tablet 2 tablets 2 times daily       metoprolol succinate ER (TOPROL-XL) 50 MG 24 hr tablet 150 mg       mupirocin (BACTROBAN) 2 % external ointment Apply topically daily 30 g 3     nitroFURantoin macrocrystal-monohydrate (MACROBID) 100 MG capsule Take 100 mg by mouth daily       omeprazole (PRILOSEC) 20 MG DR capsule Take 20 mg by mouth daily       sodium fluoride dental gel (PREVIDENT) 1.1 % GEL topical gel        trospium (SANCTURA) 20 MG tablet        Vibegron (GEMTESA) 75 MG TABS tablet        warfarin ANTICOAGULANT (COUMADIN) 2 MG tablet warfarin 2 mg tablet         Allergies, family, and social history were reviewed and documented as needed in EHR.     REVIEW OF SYSTEMS  A focused ROS was performed, with pertinent positives and negatives as noted in the HPI.    PHYSICAL EXAM  /58 (BP Location: Right arm, Patient Position: Sitting, Cuff Size: Adult Large)   Pulse 80   Wt 95.3 kg (210 lb)   BMI 33.89 kg/m    Body mass index is 33.89 kg/m .  Constitutional: Vital signs reviewed, as recorded above. Patient is alert, oriented and appears in no acute distress.  MSK: No clubbing or cyanosis; normal muscle bulk and tone. Using walker.  Neurological: Alert and oriented times 3.    DATA REVIEW  Each of the following laboratory and/or imaging studies were reviewed.    Component      Latest Ref Rng 4/20/2023  10:42 AM   Sodium      136 - 145 mmol/L 135 (L)    Potassium      3.4 - 5.3 mmol/L 4.6    Chloride      98 - 107 mmol/L 99    Carbon Dioxide (CO2)      22 - 29 mmol/L 26    Anion Gap      7 -  15 mmol/L 10    Urea Nitrogen      8.0 - 23.0 mg/dL 24.5 (H)    Creatinine      0.51 - 0.95 mg/dL 1.50 (H)    Calcium      8.8 - 10.2 mg/dL 9.5    Glucose      70 - 99 mg/dL 158 (H)    GFR Estimate      >60 mL/min/1.73m2 35 (L)    Hemoglobin A1C      0.0 - 5.6 % 6.5 (H)       Legend:  (L) Low  (H) High        ASSESSMENT  1.  Diabetes mellitus, type 2.  Hemoglobin A1c checked prior to this visit indicates significant improvement in glycemia.  Tolerating Trulicity without side effects.  We will adjust Trulicity dose upward and reduce sulfonylurea dose.  She will update me if noticing recurrent hypoglycemia or significant hyperglycemia.  Otherwise, we will plan on follow-up next available visit with labs prior to appointment.    2.  Diabetes preventive care.  -No known diabetic retinopathy, had annual eye exam at [facility] on 8/26/2022, no diabetic retinopathy  -CKD, has preferred to defer nephrology referral; negative urine microalbumin screen in 7/2022, recheck prior to next visit  -Has peripheral neuropathic symptoms, previously been followed by Dr. Enciso for foot ulcers    3.  Osteoporosis.  Based on DXA from 2020 and history of low trauma fracture.  Has now had additional fractures diagnosed.  Has preferred to follow-up on this with orthopedics and PCP.    4.  Hypertension.  Blood pressure controlled.    5.  Hyperlipidemia.  On statin therapy.    6.  Coronary artery disease and paroxysmal atrial fibrillation.  Following in cardiology.    7. History of hyperthyroidism. Clinically appears euthyroid.  Thyroid function tests checked in 7/2022 were normal.    8. History of thyroid nodules. Subcentimeter and stable based on last US report from 2013.  Could consider follow-up US in the future, although we would need to discuss patient's interested in any invasive measures for testing/treatment before proceeding with ultrasound.  Discuss at future visits.    PLAN  -Increase Trulicity to 1.5 mg weekly  -Keep well  hydrated  -With increase in Trulicity, then reduce glipizde to 10 mg once daily  -Check blood glucose once daily as much as possible at alternating times, first thing in the morning on one day and before dinner on another; also check with any unusual symptoms that could be due to low blood glucose  -Return for a follow-up visit in August, with labs before visit  -We will communicate results by letter, or if needed by phone       Orders Placed This Encounter   Procedures     Hemoglobin A1c     Comprehensive metabolic panel     Albumin Random Urine Quantitative with Creat Ratio       I spent a total of 30 minutes on the date of encounter reviewing medical records, evaluating the patient, coordinating care and documenting in the EHR, as detailed above.    Adriano Sampson MD   Division of Diabetes, Endocrinology and Metabolism  Department of Medicine      cc: Pelon Drew MD        Again, thank you for allowing me to participate in the care of your patient.        Sincerely,        ADRIANO Sampson MD

## 2023-04-26 NOTE — PATIENT INSTRUCTIONS
-Increase Trulicity to 1.5 mg weekly  -Keep well hydrated  -With increase in Trulicity, then reduce glipizide to 10 mg once daily  -Check blood glucose once daily as much as possible at alternating times, first thing in the morning on one day and before dinner on another; also check with any unusual symptoms that could be due to low blood glucose  -Return for a follow-up visit in August, with labs before visit  -We will communicate results by letter, or if needed by phone

## 2023-04-26 NOTE — PROGRESS NOTES
ENDOCRINOLOGY FOLLOW-UP         HISTORY OF PRESENT ILLNESS    Taylor Sanford is seen in follow-up for the issues outlined below.     We started Trulicity and discontinued Tradjenta last visit in 1/2023.  Patient confirms she has made this change and has been tolerating Trulicity without side effect.    Current diabetes regimen: Trulicity 0.75 mg weekly, glipizide XL 10 mg twice daily.    Checking fingerstick glucose mostly once a day in the morning, rarely in the latter part of the day around noon time.  Average glucose 119 over the past 2 weeks, no documented hypoglycemia.  She does recall an episode of hypoglycemia with blood glucose of 54 in the interim since last visit.    Has been following with Dr. Enciso and podiatry: Ulceration in the third digit on the right foot had resolved at the time of follow-up earlier this month.  He recommended ongoing foot care/precautions and follow-up in podiatry as needed.    Pertinent endocrine and related history:  1.  Diabetes mellitus, type 2.  Diagnosed approximately in the late 1990s.  -Metformin had to be discontinued due to change in kidney function.  2. History of amiodarone-induced thyroiditis. 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 Thyroid ultrasound 2011 showed heterogenous thyroid parenchyma: two subcentimeter thyroid nodules were noted, one in left and another in right lobe.  -Most recent thyroid US 9/24/2013 showed the following:  Right lobe spongiform 0.4 x 0.3  x 0.4 cm nodule in the midpole, previously measuring 0.3 x 0.2 cm.   Left lobe isoechoic nodule in the lower pole measuring 0.6 x 0.5 x 0.5 cm unchanged with little vascularity.   3. Chronic kidney disease.  4.  Osteoporosis.  [facility]  History of right hip fracture after falling  from standing height.  Has preferred to have osteoporosis managed by orthopedics and PCP.  5.  Atrial fibrillation.  6.  Coronary artery disease, status post PCI in 2002.  7.  Hyperlipidemia.  8.  Hypertension.  9.  Depression.    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  Had 2 children, a son and a daughter; her daughter passed away.    PAST MEDICAL HISTORY  Past Medical History:   Diagnosis Date     Benign essential hypertension      CAD (coronary artery disease)      Mixed hyperlipidemia      Paroxysmal atrial fibrillation (H)      Type 2 diabetes mellitus with skin complication, without long-term current use of insulin (H)        MEDICATIONS  Current Outpatient Medications   Medication Sig Dispense Refill     allopurinol (ZYLOPRIM) 100 MG tablet Take 200 mg by mouth       amLODIPine (NORVASC) 5 MG tablet amlodipine 5 mg tablet       aspirin (ASA) 81 MG chewable tablet Asprin Ec Low Dose       atorvastatin (LIPITOR) 80 MG tablet Take 80 mg by mouth       blood glucose (ACCU-CHEK ADIS PLUS) test strip 1 strip by In Vitro route daily       buPROPion (WELLBUTRIN) 75 MG tablet One and a half tablets bid       Calcium Carb-Cholecalciferol (CALCIUM CARBONATE-VITAMIN D3) 600-400 MG-UNIT TABS Take 1 tablet by mouth 2 times daily       Calcium Citrate (CITRACAL OR) Take 1 tablet by mouth daily       cephALEXin (KEFLEX) 500 MG capsule Take 1 capsule (500 mg) by mouth 3 times daily 30 capsule 0     cholestyramine (QUESTRAN) 4 g packet        cilostazol (PLETAL) 100 MG tablet Take 100 mg by mouth 2 times daily       dulaglutide (TRULICITY) 0.75 MG/0.5ML pen Inject 0.75 mg Subcutaneous every 7 days 2 mL 3     furosemide (LASIX) 20 MG tablet 40 mg daily       gabapentin (NEURONTIN) 300 MG capsule Take 1 capsule by mouth 3 times daily       gabapentin (NEURONTIN) 300 MG capsule 300 mg 2 times daily       glipiZIDE (GLUCOTROL XL) 10 MG 24 hr tablet Take 10 mg by  mouth 2 times daily       hyoscyamine (LEVSIN) 0.125 MG tablet Take 0.125 mg by mouth       lisinopril (ZESTRIL) 5 MG tablet 5 mg daily       magnesium 250 MG tablet 2 tablets 2 times daily       metoprolol succinate ER (TOPROL-XL) 50 MG 24 hr tablet 150 mg       mupirocin (BACTROBAN) 2 % external ointment Apply topically daily 30 g 3     nitroFURantoin macrocrystal-monohydrate (MACROBID) 100 MG capsule Take 100 mg by mouth daily       omeprazole (PRILOSEC) 20 MG DR capsule Take 20 mg by mouth daily       sodium fluoride dental gel (PREVIDENT) 1.1 % GEL topical gel        trospium (SANCTURA) 20 MG tablet        Vibegron (GEMTESA) 75 MG TABS tablet        warfarin ANTICOAGULANT (COUMADIN) 2 MG tablet warfarin 2 mg tablet         Allergies, family, and social history were reviewed and documented as needed in EHR.     REVIEW OF SYSTEMS  A focused ROS was performed, with pertinent positives and negatives as noted in the HPI.    PHYSICAL EXAM  /58 (BP Location: Right arm, Patient Position: Sitting, Cuff Size: Adult Large)   Pulse 80   Wt 95.3 kg (210 lb)   BMI 33.89 kg/m    Body mass index is 33.89 kg/m .  Constitutional: Vital signs reviewed, as recorded above. Patient is alert, oriented and appears in no acute distress.  MSK: No clubbing or cyanosis; normal muscle bulk and tone. Using walker.  Neurological: Alert and oriented times 3.    DATA REVIEW  Each of the following laboratory and/or imaging studies were reviewed.    Component      Latest Ref Rng 4/20/2023  10:42 AM   Sodium      136 - 145 mmol/L 135 (L)    Potassium      3.4 - 5.3 mmol/L 4.6    Chloride      98 - 107 mmol/L 99    Carbon Dioxide (CO2)      22 - 29 mmol/L 26    Anion Gap      7 - 15 mmol/L 10    Urea Nitrogen      8.0 - 23.0 mg/dL 24.5 (H)    Creatinine      0.51 - 0.95 mg/dL 1.50 (H)    Calcium      8.8 - 10.2 mg/dL 9.5    Glucose      70 - 99 mg/dL 158 (H)    GFR Estimate      >60 mL/min/1.73m2 35 (L)    Hemoglobin A1C      0.0 - 5.6 %  6.5 (H)       Legend:  (L) Low  (H) High        ASSESSMENT  1.  Diabetes mellitus, type 2.  Hemoglobin A1c checked prior to this visit indicates significant improvement in glycemia.  Tolerating Trulicity without side effects.  We will adjust Trulicity dose upward and reduce sulfonylurea dose.  She will update me if noticing recurrent hypoglycemia or significant hyperglycemia.  Otherwise, we will plan on follow-up next available visit with labs prior to appointment.    2.  Diabetes preventive care.  -No known diabetic retinopathy, had annual eye exam at [facility] on 8/26/2022, no diabetic retinopathy  -CKD, has preferred to defer nephrology referral; negative urine microalbumin screen in 7/2022, recheck prior to next visit  -Has peripheral neuropathic symptoms, previously been followed by Dr. Enciso for foot ulcers    3.  Osteoporosis.  Based on DXA from 2020 and history of low trauma fracture.  Has now had additional fractures diagnosed.  Has preferred to follow-up on this with orthopedics and PCP.    4.  Hypertension.  Blood pressure controlled.    5.  Hyperlipidemia.  On statin therapy.    6.  Coronary artery disease and paroxysmal atrial fibrillation.  Following in cardiology.    7. History of hyperthyroidism. Clinically appears euthyroid.  Thyroid function tests checked in 7/2022 were normal.    8. History of thyroid nodules. Subcentimeter and stable based on last US report from 2013.  Could consider follow-up US in the future, although we would need to discuss patient's interested in any invasive measures for testing/treatment before proceeding with ultrasound.  Discuss at future visits.    PLAN  -Increase Trulicity to 1.5 mg weekly  -Keep well hydrated  -With increase in Trulicity, then reduce glipizde to 10 mg once daily  -Check blood glucose once daily as much as possible at alternating times, first thing in the morning on one day and before dinner on another; also check with any unusual symptoms that  could be due to low blood glucose  -Return for a follow-up visit in August, with labs before visit  -We will communicate results by letter, or if needed by phone       Orders Placed This Encounter   Procedures     Hemoglobin A1c     Comprehensive metabolic panel     Albumin Random Urine Quantitative with Creat Ratio       I spent a total of 30 minutes on the date of encounter reviewing medical records, evaluating the patient, coordinating care and documenting in the EHR, as detailed above.    Clarissa Sampson MD   Division of Diabetes, Endocrinology and Metabolism  Department of Medicine      cc: Pelon Drew MD

## 2023-05-19 ENCOUNTER — MEDICAL CORRESPONDENCE (OUTPATIENT)
Dept: HEALTH INFORMATION MANAGEMENT | Facility: CLINIC | Age: 83
End: 2023-05-19

## 2023-05-19 ENCOUNTER — TELEPHONE (OUTPATIENT)
Dept: ENDOCRINOLOGY | Facility: CLINIC | Age: 83
End: 2023-05-19
Payer: COMMERCIAL

## 2023-05-19 NOTE — TELEPHONE ENCOUNTER
[facility] Call Center    Phone Message    May a detailed message be left on voicemail: yes     Reason for Call: Medication Question or concern regarding medication   Prescription Clarification  Name of Medication: dulaglutide (TRULICITY) 1.5 MG/0.5ML pen    Prescribing Provider: Dr. Sampson     Pharmacy:[id number] - [address]     What on the order needs clarification? Patient states she was instructed at her last visit to use up the insulin she had first, and then the provider will change her dosage. She is expected to finish her medication by Sunday and will need an order for her new dosage. Please follow up with patient to advise. Thank you.    Action Taken: Message routed to:  Other: Endo    Travel Screening: Not Applicable

## 2023-05-19 NOTE — TELEPHONE ENCOUNTER
Pt called to discuss. She understands that she will start the 1.5 mg dose. She will contact pharmacy as they should have refills available and she will let us know if there are any issues.    PLAN  -Increase Trulicity to 1.5 mg weekly  -Keep well hydrated  -With increase in Trulicity, then reduce glipizde to 10 mg once daily  -Check blood glucose once daily as much as possible at alternating times, first thing in the morning on one day and before dinner on another; also check with any unusual symptoms that could be due to low blood glucose  -Return for a follow-up visit in August, with labs before visit  -We will communicate results by letter, or if needed by phone

## 2023-06-02 ENCOUNTER — TELEPHONE (OUTPATIENT)
Dept: ENDOCRINOLOGY | Facility: CLINIC | Age: 83
End: 2023-06-02
Payer: COMMERCIAL

## 2023-06-02 DIAGNOSIS — E11.65 TYPE 2 DIABETES MELLITUS WITH HYPERGLYCEMIA, WITHOUT LONG-TERM CURRENT USE OF INSULIN (H): ICD-10-CM

## 2023-06-02 RX ORDER — GLIPIZIDE 10 MG/1
10 TABLET, FILM COATED, EXTENDED RELEASE ORAL 2 TIMES DAILY
Qty: 180 TABLET | Refills: 0 | Status: SHIPPED | OUTPATIENT
Start: 2023-06-02 | End: 2023-12-04

## 2023-06-02 NOTE — TELEPHONE ENCOUNTER
I called the pt, she increased the dose of trulicity on Sunday. The pt states that last night her mouth was dry and her tongue was swollen. The pt states that she was having these sx on the lower dose of trulicity, however did not correlate the two. The pt will NOT take trulicity this Sunday. The pt will increase her glipizide back to the dose she was on. The pt is advised to go to the ER if these symptoms persist or worsen. Pt understands.

## 2023-06-02 NOTE — TELEPHONE ENCOUNTER
Agree with recommendations: Discontinue Trulicity and increase glipizide XL to 10 mg twice daily (we can update prescription if needed).  Please add to allergy list.  Would add that if she notices tongue swelling is progressing, has wheezing or shortness of breath she needs to call 911 immediately.    If blood glucose values are 200s or higher consistently or under 70 twice or more in a week once she stops Trulicity and increase his glipizide, she should contact our clinic for guidance on how to adjust her regimen.

## 2023-06-02 NOTE — TELEPHONE ENCOUNTER
[facility] Call Center    Phone Message    May a detailed message be left on voicemail: yes     Reason for Call: Other: Per pt would like the team to know she is having swelling around her mouth like on her lips, blurred vision , and slur speaking for a few days. Per pt is worried and is wondering if she should not take the next dose on Sunday? Per pt would like to know what she should do at this point. Please and thank you!     Action Taken: Message routed to:  [facility] ENDO    Travel Screening: Not Applicable

## 2023-06-08 ENCOUNTER — TELEPHONE (OUTPATIENT)
Dept: VASCULAR SURGERY | Facility: CLINIC | Age: 83
End: 2023-06-08
Payer: COMMERCIAL

## 2023-06-08 NOTE — TELEPHONE ENCOUNTER
Caller: Patient     Provider: CARLITA Enciso    Detailed reason for call: Patient states she has bleeding around the nail bed on her right middle toe and possibly an open sore. She is scheduled next week to see Dr. Enicso, but wondering if she should be seen sooner/what she should do in the meantime.     Best phone number to contact: [phone]    Best time to contact: She will be unavailable in about an hour; she has an appt with O&P in [place]     Ok to leave a detailed message: Yes    Ok to speak to authorized person if needed: No      (Noted to patient if reason is related to wound or incision, to please send a photo via email or R&Vt.)

## 2023-06-08 NOTE — TELEPHONE ENCOUNTER
Spoke to Taylor. She thinks a sore has developed on the right 3rd toe which is the same toe that was being treated prior by Dr. Enciso. Let her know she should keep the toe covered and offload the toe by wearing open toe shoes or slippers. Watch for signs and symptoms of infection.  Taylor is seeing O&P for new orthotics in about an hour. Appointment with Dr. Enciso on 6/13.

## 2023-06-13 ENCOUNTER — OFFICE VISIT (OUTPATIENT)
Dept: VASCULAR SURGERY | Facility: CLINIC | Age: 83
End: 2023-06-13
Attending: PODIATRIST
Payer: COMMERCIAL

## 2023-06-13 VITALS
SYSTOLIC BLOOD PRESSURE: 132 MMHG | RESPIRATION RATE: 18 BRPM | TEMPERATURE: 98 F | DIASTOLIC BLOOD PRESSURE: 77 MMHG | HEART RATE: 93 BPM | OXYGEN SATURATION: 98 %

## 2023-06-13 DIAGNOSIS — M20.41 HAMMERTOE OF RIGHT FOOT: ICD-10-CM

## 2023-06-13 DIAGNOSIS — I73.9 PAD (PERIPHERAL ARTERY DISEASE) (H): ICD-10-CM

## 2023-06-13 DIAGNOSIS — L97.511 DIABETIC ULCER OF TOE OF RIGHT FOOT ASSOCIATED WITH TYPE 2 DIABETES MELLITUS, LIMITED TO BREAKDOWN OF SKIN (H): Primary | ICD-10-CM

## 2023-06-13 DIAGNOSIS — E11.621 DIABETIC ULCER OF TOE OF RIGHT FOOT ASSOCIATED WITH TYPE 2 DIABETES MELLITUS, LIMITED TO BREAKDOWN OF SKIN (H): Primary | ICD-10-CM

## 2023-06-13 PROCEDURE — 97597 DBRDMT OPN WND 1ST 20 CM/<: CPT | Mod: XU | Performed by: PODIATRIST

## 2023-06-13 PROCEDURE — 11042 DBRDMT SUBQ TIS 1ST 20SQCM/<: CPT | Performed by: PODIATRIST

## 2023-06-13 ASSESSMENT — PAIN SCALES - GENERAL: PAINLEVEL: NO PAIN (0)

## 2023-06-13 NOTE — PATIENT INSTRUCTIONS
Important lnstructions      WEIGHT BEARING STATUS: You are to remain NON WEIGHT BEARING on your right foot. NON WEIGHT BEARING MEANS NO PRESSURE ON YOUR FOOT OR HEEL AT ANY TIME FOR ANY REASON!    2. OFFLOADING DEVICE: Must use a A WHEELCHAIR at all times! (do not use affected foot to push wheelchair)    3. ELEVATE: Elevating your leg means laying with your head on a pillow and your foot ABOVE YOUR HEART.     4. DO NOT MOVE YOUR FOOT.  There is a risk of worsening the wound or incision. To give yourself a higher chance of healing, please DO NOT swing foot back and forth and wiggle foot/toes especially when inside a stabilization device. Limited movement is allowable with therapy as recommended by the doctor.     5. TAKE A PROTEIN SHAKE TWICE A DAY.  (For ex: Boost, Ensure, Glucerna)      Dressing Change lnstructions          - Dressing Application of  Endoform for toe wounds:    1. Endoform should be cut to the size of the wound.  It should touch the edges of the ulcer. It is okay if it overlap the edges a small amount.  Then, moisten the Endoform with saline.(If it is easier for you, you can moisten it before laying it in the wound)    2. Cover the wound with Endoform, followed by dry gauze, and secure with roll gauze if needed.     3. Endoform is naturally used by the body over time so you may not find it in the wound when you change your dressing.  If you do find some, gently cleanse the wound with saline. Do not remove the remaining Endoform, which may appear as an off-white to fontaine gel, just add Endoform on top.  Usually, more Endoform will need to be added every 5-7 days.     4. Change your top dressing every 1-2 days or as needed depending on drainage.    -Endoform is a collagen dressing created from ovine (sheep) fore-stomach tissue. The collagen extracellular matrix transforms into a soft conforming sheet, which is naturally incorporated into the wound over time.  Collagen dressings are used to  stimulate wound healing.   ,        It IS NOT ok to get your wound wet in the bath or shower    SEEK MEDICAL CARE IF:  You have an increase in swelling, pain, or redness around the wound.  You have an increase in the amount of pus coming from the wound.  There is a bad smell coming from the wound.  The wound appears to be worsening/enlarging  You have a fever greater than 101.5 F      It is ok to continue current wound care treatment/products for the next 2-3 days until new wound care supplies are ordered and arrive. If longer than this please contact our office at [phone].        We want to hear from you!   In the next few weeks, you should receive a call or email to complete a survey about your visit at [facility]. Please help us improve your appointment experience by letting us know how we did today. We strive to make your experience good and value any ways in which we could do better.      We value your input and suggestions.    Thank you for choosing the [facility]!

## 2023-06-13 NOTE — PROGRESS NOTES
FOOT AND ANKLE SURGERY/PODIATRY Progress Note      ASSESSMENT:   Diabetic ulceration third digit right foot  Diabetic ulceration right hallux  Hammertoe    A new wound was identified today: yes,  it is located Third digit and right hallux.    TREATMENT:  -I discussed with the patient that the ulceration of the distal third digit and right hallux are free from infection.  We discussed etiology of the ulceration along the distal third digit to include excessive pressure due to rigid hammertoe deformity.  Patient has tried to use a crest pad to relieve pressure at this location but this has been unsuccessful.  We discussed arthroplasty procedure to correct hammertoe deformity and prevent reulceration at this location.  Patient will consider this procedure and we will proceed with this after the ulceration of the third digit right foot has resolved.    -Recommend nonweightbearing in the right foot with wheelchair which she has in her possession.    -Referred for updated ABIs with toe pressures    -After discussion of risk factors, nursing staff removed dressing, cleansed wound and consent obtained 2% Lidocaine HCL jelly was applied, under clean conditions, the right hallux ulceration(s) were debrided using #15 blade scalpel.  Devitalized and nonviable tissue, along with any fibrin and slough, was removed to improve granulation tissue formation, stimulate wound healing, decrease overall bacteria load, disrupt biofilm formation and decrease edge senescence. Wound drainage was scant No. Total excisional debridement was 0.16 sq cm into the subcutaneous tissue with a depth of 0.2 cm.   Ulcers were improved afterwards and .  Measures were as noted on the flow sheet. Collagen with a gauze dresssing was applied. She will continue to apply Collagen with a gauze dresssing as directed.    -After discussion of risk factors, nursing staff removed dressing, cleansed wound and consent obtained 2% Lidocaine HCL jelly was  applied, under clean conditions, the third digit right foot ulceration(s) were debrided using #15 blade scalpel.  Devitalized and nonviable tissue, along with any fibrin and slough, was removed to improve granulation tissue formation, stimulate wound healing, decrease overall bacteria load, disrupt biofilm formation and decrease edge senescence. Wound drainage was scant No. Total excisional debridement was 0.12 sq cm from the epidermis/dermis area with a depth of 0.1 cm.   Ulcers were improved afterwards and .  Measures were as noted on the flow sheet. Collagen with a gauze dresssing was applied. She will continue to apply Collagen with a gauze dresssing as directed.    -She will follow-up in 3 to 4 weeks    Wily Enciso DPM  [facility]      HPI: Taylor Sanford was seen again today for concerns related to excessive callus buildup along the right hallux and third digit right foot.  She is try to use a pumice stone to remove the callus tissue but admits that it is difficult for her to trim the callus efficiently.    Past Medical History:   Diagnosis Date     Benign essential hypertension      CAD (coronary artery disease)      Mixed hyperlipidemia      Paroxysmal atrial fibrillation (H)      Type 2 diabetes mellitus with skin complication, without long-term current use of insulin (H)        Past Surgical History:   Procedure Laterality Date     APPENDECTOMY       CARDIAC SURGERY       CHOLECYSTECTOMY       IR LOWER EXTREMITY ANGIOGRAM RIGHT  9/24/2019     IR LOWER EXTREMITY ANGIOGRAM RIGHT  5/29/2018     IR LOWER EXTREMITY ANGIOGRAM RIGHT  12/26/2017     IR LOWER EXTREMITY ANGIOGRAM RIGHT  8/28/2017     IR LOWER EXTREMITY ANGIOGRAM RIGHT  8/5/2017     IR LOWER EXTREMITY ANGIOGRAM RIGHT  4/25/2017     IR LOWER EXTREMITY ANGIOGRAM RIGHT  6/9/2016       Allergies   Allergen Reactions     Amiodarone      Developed Thyroid toxicity     Sulfonylureas Rash     Amantadine Unknown      Amlodipine Unknown     Sulfa Antibiotics Other (See Comments)     Trulicity [Dulaglutide] Swelling     Swelling of the tongue         Current Outpatient Medications:      allopurinol (ZYLOPRIM) 100 MG tablet, Take 200 mg by mouth, Disp: , Rfl:      amLODIPine (NORVASC) 5 MG tablet, amlodipine 5 mg tablet, Disp: , Rfl:      aspirin (ASA) 81 MG chewable tablet, Asprin Ec Low Dose, Disp: , Rfl:      atorvastatin (LIPITOR) 80 MG tablet, Take 80 mg by mouth, Disp: , Rfl:      blood glucose (ACCU-CHEK ADIS PLUS) test strip, 1 strip by In Vitro route daily, Disp: , Rfl:      buPROPion (WELLBUTRIN) 75 MG tablet, One and a half tablets bid, Disp: , Rfl:      Calcium Carb-Cholecalciferol (CALCIUM CARBONATE-VITAMIN D3) 600-400 MG-UNIT TABS, Take 1 tablet by mouth 2 times daily, Disp: , Rfl:      Calcium Citrate (CITRACAL OR), Take 1 tablet by mouth daily, Disp: , Rfl:      cholestyramine (QUESTRAN) 4 g packet, , Disp: , Rfl:      furosemide (LASIX) 20 MG tablet, 40 mg daily, Disp: , Rfl:      gabapentin (NEURONTIN) 300 MG capsule, Take 1 capsule by mouth 3 times daily, Disp: , Rfl:      gabapentin (NEURONTIN) 300 MG capsule, 300 mg 2 times daily, Disp: , Rfl:      glipiZIDE (GLUCOTROL XL) 10 MG 24 hr tablet, Take 1 tablet (10 mg) by mouth 2 times daily for 90 days, Disp: 180 tablet, Rfl: 0     hyoscyamine (LEVSIN) 0.125 MG tablet, Take 0.125 mg by mouth, Disp: , Rfl:      lisinopril (ZESTRIL) 5 MG tablet, 5 mg daily, Disp: , Rfl:      magnesium 250 MG tablet, 2 tablets 2 times daily, Disp: , Rfl:      metoprolol succinate ER (TOPROL-XL) 50 MG 24 hr tablet, 150 mg, Disp: , Rfl:      mupirocin (BACTROBAN) 2 % external ointment, Apply topically daily, Disp: 30 g, Rfl: 3     nitroFURantoin macrocrystal-monohydrate (MACROBID) 100 MG capsule, Take 100 mg by mouth daily, Disp: , Rfl:      omeprazole (PRILOSEC) 20 MG DR capsule, Take 20 mg by mouth daily, Disp: , Rfl:      sodium fluoride dental gel (PREVIDENT) 1.1 % GEL topical gel,  , Disp: , Rfl:      trospium (SANCTURA) 20 MG tablet, , Disp: , Rfl:      Vibegron (GEMTESA) 75 MG TABS tablet, , Disp: , Rfl:      warfarin ANTICOAGULANT (COUMADIN) 2 MG tablet, warfarin 2 mg tablet, Disp: , Rfl:      cilostazol (PLETAL) 100 MG tablet, Take 100 mg by mouth 2 times daily, Disp: , Rfl:     Review of Systems - 10 point Review of Systems is negative except for ulcers right foot which is noted in HPI.      OBJECTIVE:  /77   Pulse 93   Temp 98  F (36.7  C)   Resp 18   SpO2 98%   General appearance: Patient is alert and fully cooperative with history & exam.  No sign of distress is noted during the visit.    Vascular: Dorsalis pedis non-palpableRight.  Dermatologic:    VASC Wound right 3rd toe (Active)   Post Size Length 0.4 06/13/23 1400   Post Size Width 0.3 06/13/23 1400   Post Size Depth 0.1 06/13/23 1400   Post Total Sq cm 0.12 06/13/23 1400       VASC Wound right hallux (Active)   Post Size Length 0.4 06/13/23 1400   Post Size Width 0.4 06/13/23 1400   Post Size Depth 0.2 06/13/23 1400   Post Total Sq cm 0.2 06/13/23 1400   After removal of callus buildup along distal third digit and medial right hallux, ulcerations identified at both locations with granular base.  No erythema right foot.  Neurologic: Diminished to light touch Right.  Musculoskeletal: Contracted digits noted Right.    Imaging:     CT Sacrum/Coccyx WO IV Cont    Result Date: 6/11/2023  EXAM: CT SACRUM/COCCYX WO IV CONT [facility]:  [facility] DATE/TIME: 6/9/2023 11:28 AM CDT INDICATION: Evaluate healing of sacral insufficiency fractures, Other fracture of sacrum, subsequent enc. COMPARISON: 08/01/2022 CT. TECHNIQUE: CT scan of the pelvis was performed without IV contrast. Multiplanar reformats were obtained. Dose reduction techniques were used. CONTRAST: None. FINDINGS: Bones: Long cannulated screws extending across the sacral ala and both sacroiliac joints. No evidence of hardware loosening. No evidence of an acute  sacral fracture. There are old healed bilateral sacral ala fractures. Advanced multilevel degenerative disc disease changes in the lower lumbar spine. Low-grade anterolisthesis of L4 on L5. Osteopenia. Dynamic compression screw fixation the right femur. Soft tissues: No free fluid or inflammatory changes in the visualized portions of the pelvis. IMPRESSION: 1.  Long cannulated screws extending across both sacral ala and the sacroiliac joints. 2.  No acute fracture. 3.  Old healed fractures of sacral ala. 4.  Advanced multilevel degenerative disc disease changes in the lower lumbar spine.    CT Lumbar Spine w/o Contrast    Result Date: 6/9/2023  EXAM: CT LUMBAR SPINE WO IV CONT [facility]:  [facility] DATE/TIME: 6/9/2023 11:25 AM CDT INDICATION: Low back pain COMPARISON: Lumbar spine plain films 07/26/2022 TECHNIQUE: Routine CT Lumbar Spine without IV contrast. Multiplanar reformats. Dose reduction techniques were used. FINDINGS: Nomenclature is based on 5 lumbar type vertebral bodies bodies. Chronic 20% L2 and 30% L4 compressions. No acute compression. Multilevel mild degenerative subluxations. Bilateral sacroiliac arthrodesis. Chronic S2 fracture. No acute pelvic abnormality. T12-L1: Normal disc height. No herniation. Normal facets. No spinal canal or neural foraminal stenosis. L1-L2: Chronic 20% superior endplate compression. Normal disc height. No herniation. Mild bilateral facet arthropathy. No spinal canal or neural foraminal stenosis. L2-L3: 4 mm degenerative retrolisthesis. Moderate disc height loss. Mild intradiscal gas. Mild circumferential disc osteophyte complex. Mild bilateral facet arthropathy. Mild effacement of the ventral spinal canal. Mild to moderate bilateral neural foraminal stenoses. L3-L4: 30% compression of the L4 superior endplate. Mild disc height loss. Mild intradiscal gas. Mild circumferential disc osteophyte complex. Moderate bilateral facet arthropathy and thickening of the  ligamenta flava. No spinal canal stenosis. Mild right and mild to moderate left neural foraminal stenoses. L4-L5: 8 mm degenerative anterolisthesis. Mild disc height loss. Mild intradiscal gas. Moderate unroofing of the disc. Severe bilateral facet arthropathy. Severe spinal canal stenosis. Moderate to severe bilateral neural foraminal stenoses. L5-S1: Normal disc height. Moderate circumferential disc osteophyte complex. Moderate bilateral facet arthropathy. No spinal canal stenosis. Moderate to severe bilateral neural foraminal stenoses. IMPRESSION: 1.  8 mm L4-L5 degenerative anterolisthesis with severe spinal canal stenosis and moderate to severe bilateral neural foraminal stenoses. 2.  Moderate to severe bilateral L5-S1 neural foraminal stenoses. 3.  Chronic 20% L2 and 30% L4 compression fractures, unchanged compared to 07/26/2022. 4.  Chronic S2 fractures. 5.  Interval, bilateral sacroiliac arthrodeses.         Picture:

## 2023-07-12 ENCOUNTER — ANCILLARY PROCEDURE (OUTPATIENT)
Dept: VASCULAR ULTRASOUND | Facility: CLINIC | Age: 83
End: 2023-07-12
Attending: PODIATRIST
Payer: COMMERCIAL

## 2023-07-12 ENCOUNTER — OFFICE VISIT (OUTPATIENT)
Dept: VASCULAR SURGERY | Facility: CLINIC | Age: 83
End: 2023-07-12
Attending: PODIATRIST
Payer: COMMERCIAL

## 2023-07-12 VITALS
OXYGEN SATURATION: 98 % | DIASTOLIC BLOOD PRESSURE: 70 MMHG | HEIGHT: 66 IN | BODY MASS INDEX: 33.75 KG/M2 | SYSTOLIC BLOOD PRESSURE: 140 MMHG | WEIGHT: 210 LBS | RESPIRATION RATE: 16 BRPM | HEART RATE: 70 BPM

## 2023-07-12 DIAGNOSIS — E11.621 DIABETIC ULCER OF TOE OF RIGHT FOOT ASSOCIATED WITH TYPE 2 DIABETES MELLITUS, LIMITED TO BREAKDOWN OF SKIN (H): Primary | ICD-10-CM

## 2023-07-12 DIAGNOSIS — L97.511 DIABETIC ULCER OF TOE OF RIGHT FOOT ASSOCIATED WITH TYPE 2 DIABETES MELLITUS, LIMITED TO BREAKDOWN OF SKIN (H): Primary | ICD-10-CM

## 2023-07-12 DIAGNOSIS — I73.9 PAD (PERIPHERAL ARTERY DISEASE) (H): ICD-10-CM

## 2023-07-12 DIAGNOSIS — M20.41 HAMMERTOE OF RIGHT FOOT: ICD-10-CM

## 2023-07-12 PROCEDURE — G0463 HOSPITAL OUTPT CLINIC VISIT: HCPCS | Mod: 25 | Performed by: PODIATRIST

## 2023-07-12 PROCEDURE — 93923 UPR/LXTR ART STDY 3+ LVLS: CPT

## 2023-07-12 PROCEDURE — 99212 OFFICE O/P EST SF 10 MIN: CPT | Performed by: PODIATRIST

## 2023-07-12 RX ORDER — METFORMIN HCL 500 MG
TABLET, EXTENDED RELEASE 24 HR ORAL
COMMUNITY
End: 2023-08-02

## 2023-07-12 ASSESSMENT — PAIN SCALES - GENERAL: PAINLEVEL: NO PAIN (0)

## 2023-07-12 NOTE — PROGRESS NOTES
FOOT AND ANKLE SURGERY/PODIATRY Progress Note      ASSESSMENT:   Diabetic ulceration third digit right foot  Diabetic ulceration right hallux  Hammertoe        TREATMENT:  -The third digit and right hallux ulcerations have resolved.  I am pleased with her progress.    -We discussed that due to her anatomy, there is a pressure point which will continue to build callus tissue. If the callus tissue becomes too thick, skin breakdown will likely occur.     -I recommend use of a pumice stone x2-3 per week to remove callus tissue. I have asked that she return for sharp debridement of the callus prn.     -She has been measured for diabetic shoes and inserts and I recommend she continue to remain limited to nonweightbearing on the right foot additional 2 weeks to allow for skin remodeling.  After this period of time she will resume use of new diabetic shoes and inserts.    -She is discharged from my care at this time but encouraged to return as concerns develop.     Wily Enciso DPM  [facility]      HPI: Taylor Sanford was seen again today for right foot ulcerations.  Patient reports she is try to remain limited weightbearing on her right foot.    Past Medical History:   Diagnosis Date     Benign essential hypertension      CAD (coronary artery disease)      Mixed hyperlipidemia      Paroxysmal atrial fibrillation (H)      Type 2 diabetes mellitus with skin complication, without long-term current use of insulin (H)        Past Surgical History:   Procedure Laterality Date     APPENDECTOMY       CARDIAC SURGERY       CHOLECYSTECTOMY       IR LOWER EXTREMITY ANGIOGRAM RIGHT  9/24/2019     IR LOWER EXTREMITY ANGIOGRAM RIGHT  5/29/2018     IR LOWER EXTREMITY ANGIOGRAM RIGHT  12/26/2017     IR LOWER EXTREMITY ANGIOGRAM RIGHT  8/28/2017     IR LOWER EXTREMITY ANGIOGRAM RIGHT  8/5/2017     IR LOWER EXTREMITY ANGIOGRAM RIGHT  4/25/2017     IR LOWER EXTREMITY ANGIOGRAM RIGHT  6/9/2016       Allergies    Allergen Reactions     Amiodarone      Developed Thyroid toxicity     Sulfonylureas Rash     Amantadine Unknown     Amlodipine Unknown     Sulfa Antibiotics Other (See Comments)     Trulicity [Dulaglutide] Swelling     Swelling of the tongue         Current Outpatient Medications:      allopurinol (ZYLOPRIM) 100 MG tablet, Take 200 mg by mouth, Disp: , Rfl:      amLODIPine (NORVASC) 5 MG tablet, amlodipine 5 mg tablet, Disp: , Rfl:      aspirin (ASA) 81 MG chewable tablet, Asprin Ec Low Dose, Disp: , Rfl:      atorvastatin (LIPITOR) 80 MG tablet, Take 80 mg by mouth, Disp: , Rfl:      blood glucose (ACCU-CHEK ADIS PLUS) test strip, 1 strip by In Vitro route daily, Disp: , Rfl:      buPROPion (WELLBUTRIN) 75 MG tablet, One and a half tablets bid, Disp: , Rfl:      Calcium Carb-Cholecalciferol (CALCIUM CARBONATE-VITAMIN D3) 600-400 MG-UNIT TABS, Take 1 tablet by mouth 2 times daily, Disp: , Rfl:      Calcium Citrate (CITRACAL OR), Take 1 tablet by mouth daily, Disp: , Rfl:      cholestyramine (QUESTRAN) 4 g packet, , Disp: , Rfl:      furosemide (LASIX) 20 MG tablet, 40 mg daily, Disp: , Rfl:      gabapentin (NEURONTIN) 300 MG capsule, Take 1 capsule by mouth 3 times daily, Disp: , Rfl:      gabapentin (NEURONTIN) 300 MG capsule, 300 mg 2 times daily, Disp: , Rfl:      glipiZIDE (GLUCOTROL XL) 10 MG 24 hr tablet, Take 1 tablet (10 mg) by mouth 2 times daily for 90 days, Disp: 180 tablet, Rfl: 0     hyoscyamine (LEVSIN) 0.125 MG tablet, Take 0.125 mg by mouth, Disp: , Rfl:      lisinopril (ZESTRIL) 5 MG tablet, 5 mg daily, Disp: , Rfl:      magnesium 250 MG tablet, 2 tablets 2 times daily, Disp: , Rfl:      metFORMIN (GLUCOPHAGE XR) 500 MG 24 hr tablet, , Disp: , Rfl:      metoprolol succinate ER (TOPROL-XL) 50 MG 24 hr tablet, 150 mg, Disp: , Rfl:      mupirocin (BACTROBAN) 2 % external ointment, Apply topically daily, Disp: 30 g, Rfl: 3     nitroFURantoin macrocrystal-monohydrate (MACROBID) 100 MG capsule, Take  "100 mg by mouth daily, Disp: , Rfl:      omeprazole (PRILOSEC) 20 MG DR capsule, Take 20 mg by mouth daily, Disp: , Rfl:      sodium fluoride dental gel (PREVIDENT) 1.1 % GEL topical gel, , Disp: , Rfl:      trospium (SANCTURA) 20 MG tablet, , Disp: , Rfl:      Vibegron (GEMTESA) 75 MG TABS tablet, , Disp: , Rfl:      warfarin ANTICOAGULANT (COUMADIN) 2 MG tablet, warfarin 2 mg tablet, Disp: , Rfl:      cilostazol (PLETAL) 100 MG tablet, Take 100 mg by mouth 2 times daily, Disp: , Rfl:     Review of Systems - 10 point Review of Systems is negative except for right foot ulcers which is noted in HPI.      OBJECTIVE:  BP (!) 140/70   Pulse 70   Resp 16   Ht 5' 6\" (1.676 m)   Wt 210 lb (95.3 kg)   SpO2 98%   BMI 33.89 kg/m    General appearance: Patient is alert and fully cooperative with history & exam.  No sign of distress is noted during the visit.    Vascular: Dorsalis pedis non-palpableRight.  Dermatologic:    VASC Wound right 3rd toe (Active)   Post Size Length 0 07/12/23 1101   Post Size Width 0 07/12/23 1101   Post Size Depth 0 07/12/23 1101   Post Total Sq cm 0 07/12/23 1101   Description callous 07/12/23 1101       VASC Wound right hallux (Active)   Post Size Length 0 07/12/23 1101   Post Size Width 0 07/12/23 1101   Post Size Depth 0 07/12/23 1101   Post Total Sq cm 0 07/12/23 1101   Description callous 07/12/23 1101     Neurologic: Diminished to light touch Right.  Musculoskeletal: Contracted digits noted Right.        "

## 2023-07-12 NOTE — PATIENT INSTRUCTIONS
Dry gauze dressing placed on your right third toe today may be removed tomorrow. No need to redress.    Until you receive your new diabetic shoes & inserts Dr. Enciso would like you to remain LIMITED WEIGHT BEARING MEANS THAT IT IS ONLY OKAY FOR YOU TO APPLY LIGHT PRESSURE ON THE AFFECTED FOOT WHEN TRANSFERRING FROM YOUR ASSISTIVE DEVICE TO A CHAIR OR BED. On your right foot to allow the newly healed skin to become stronger.    You may begin showering as normal in 3 weeks    You should avoid soaking your right foot for the next  4 weeks, this includes swimming and hot tubs.    Continue to monitor the area for breakdown & call us if your wound reopens. Follow up with us as needed.    Your doctor recommends you use a pumice stone to get rid of your callous. You can purchase a pumice stone at your local drug store.    How to use your Pumice Stone        1. Soak your calloused skin in warm water. The most common part of the body to exfoliate with a pumice stone is the feet. Heels tend to develop a layer of hard, calloused skin that can become cracked or scaled. Soak the calloused body part in warm water for about five minutes to soften the skin.    2. Wait until your dry skin has softened. The skin will be easier to remove if it's soft and supple. Feel your skin after several minutes of soaking. If it still feels tough, wait a few more minutes (giving the water a warm-up if necessary). If it's soft, your skin is ready for the pumice stone.     3. Wet the stone. Wetting the stone will help it slide more easily across your skin, rather than catching on it. Run the stone under warm water, or dip it in the water where you're soaking your skin, in order to thoroughly wet it.    4. Rub it gently over the calloused area. Use a circular motion to start sloughing away the dead skin with the pumice stone. If the skin is nice and soft, it should start coming right off. Keep going until you remove the dead skin and get to the  fresh, supple skin underneath.   Don't press too hard. Light pressure is all that is needed; let the surface of the stone do the work.   If you're working on your feet, focus on the heels, the sides of your toes, and other areas where dry skin tends to build up.    5. Rinse and repeat. Rinse off the dead skin and take a look to see if you need to keep going. If you still see bits of dead skin, go over the area again with the pumice stone. Continue using the stone on the area until you're satisfied with the results.   Since the pumice stone will wear down slightly while you use it, you may need to turn it over to get a fresh surface you can use to exfoliate your skin.   Rinse the pumice stone often to keep its surface clean and effective.    6. Dry and moisturize your skin. When you're finished, use a towel to pat your skin dry. Coat the area with an oil or cream to prevent it from drying out too quickly. Your formerly calloused skin should now be soft, supple and gleaming.   Coconut oil, almond oil, or body lotion are all fine to use to condition your skin after pumicing.   Repeat as often as needed to keep your skin in good shape.    CARING FOR YOUR PUMICE STONE:    1. Scrub it after use. Dead skin will build up in the pores of the stone as you use it, so you'll want to clean the stone after use. Use a scrub brush to scrub the stone while holding it under running water. Add a bit of soap to help clean the stone completely. This way your stone will be clean and ready to use next time you need it.     2. Allow it to completely dry out. Set the pumice stone in a dry place so that it doesn't stay damp in between uses. Some pumice stones come with a string attached that allows you to hang the stone to dry. If you let the stone stay wet, bacteria could grow in the pores, making it unsafe to use.     3. Boil it if necessary. Every once in a while, you'll want to give the stone a deep cleaning to make sure it isn't  harboring bacteria. Bring a small pot of water to a full boil, drop in the stone, and boil it for five minutes. Use tongs to remove the stone from the water and allow it to dry completely before storing.   If you use the stone frequently, boil it every two weeks to ensure it stays clean.   If you'd like, you can add a capful of bleach to the water to be certain all the bacteria is killed.    4. Replace the stone when it wears down. Pumice is a soft stone that will eventually wear away after you've used it for awhile. When it gets too small to handle easily, or the surface becomes too smooth to be effective, purchase a new one. Pumice stones are inexpensive and can be found at any store that sells beauty supplies.           We want to hear from you!   In the next few weeks, you should receive a call or email to complete a survey about your visit at [facility]. Please help us improve your appointment experience by letting us know how we did today. We strive to make your experience good and value any ways in which we could do better.      We value your input and suggestions.    Thank you for choosing the [facility]!

## 2023-07-26 ENCOUNTER — LAB (OUTPATIENT)
Dept: LAB | Facility: CLINIC | Age: 83
End: 2023-07-26
Payer: COMMERCIAL

## 2023-07-26 DIAGNOSIS — E11.65 TYPE 2 DIABETES MELLITUS WITH HYPERGLYCEMIA, WITHOUT LONG-TERM CURRENT USE OF INSULIN (H): ICD-10-CM

## 2023-07-26 LAB
ALBUMIN SERPL BCG-MCNC: 4.4 G/DL (ref 3.5–5.2)
ALP SERPL-CCNC: 95 U/L (ref 35–104)
ALT SERPL W P-5'-P-CCNC: 19 U/L (ref 0–50)
ANION GAP SERPL CALCULATED.3IONS-SCNC: 10 MMOL/L (ref 7–15)
AST SERPL W P-5'-P-CCNC: 27 U/L (ref 0–45)
BILIRUB SERPL-MCNC: 0.5 MG/DL
BUN SERPL-MCNC: 25.5 MG/DL (ref 8–23)
CALCIUM SERPL-MCNC: 9 MG/DL (ref 8.8–10.2)
CHLORIDE SERPL-SCNC: 104 MMOL/L (ref 98–107)
CREAT SERPL-MCNC: 1.36 MG/DL (ref 0.51–0.95)
CREAT UR-MCNC: 60.1 MG/DL
DEPRECATED HCO3 PLAS-SCNC: 26 MMOL/L (ref 22–29)
GFR SERPL CREATININE-BSD FRML MDRD: 39 ML/MIN/1.73M2
GLUCOSE SERPL-MCNC: 168 MG/DL (ref 70–99)
HBA1C MFR BLD: 8.1 % (ref 0–5.6)
MICROALBUMIN UR-MCNC: <12 MG/L
MICROALBUMIN/CREAT UR: NORMAL MG/G{CREAT}
POTASSIUM SERPL-SCNC: 5.3 MMOL/L (ref 3.4–5.3)
PROT SERPL-MCNC: 6.7 G/DL (ref 6.4–8.3)
SODIUM SERPL-SCNC: 140 MMOL/L (ref 136–145)

## 2023-07-26 PROCEDURE — 83036 HEMOGLOBIN GLYCOSYLATED A1C: CPT

## 2023-07-26 PROCEDURE — 82043 UR ALBUMIN QUANTITATIVE: CPT

## 2023-07-26 PROCEDURE — 80053 COMPREHEN METABOLIC PANEL: CPT

## 2023-07-26 PROCEDURE — 82570 ASSAY OF URINE CREATININE: CPT

## 2023-07-26 PROCEDURE — 36415 COLL VENOUS BLD VENIPUNCTURE: CPT

## 2023-08-02 ENCOUNTER — OFFICE VISIT (OUTPATIENT)
Dept: ENDOCRINOLOGY | Facility: CLINIC | Age: 83
End: 2023-08-02
Payer: COMMERCIAL

## 2023-08-02 VITALS
SYSTOLIC BLOOD PRESSURE: 118 MMHG | HEART RATE: 72 BPM | DIASTOLIC BLOOD PRESSURE: 64 MMHG | WEIGHT: 210 LBS | BODY MASS INDEX: 33.89 KG/M2

## 2023-08-02 DIAGNOSIS — E11.65 TYPE 2 DIABETES MELLITUS WITH HYPERGLYCEMIA, WITHOUT LONG-TERM CURRENT USE OF INSULIN (H): Primary | ICD-10-CM

## 2023-08-02 DIAGNOSIS — N18.32 CHRONIC KIDNEY DISEASE, STAGE 3B (H): ICD-10-CM

## 2023-08-02 PROCEDURE — 99215 OFFICE O/P EST HI 40 MIN: CPT | Performed by: INTERNAL MEDICINE

## 2023-08-02 NOTE — LETTER
8/2/2023         RE: Taylor Sanford  [address] [facility] [address]        Dear Colleague,    Thank you for referring your patient, Taylor Sanford, to the [facility]. Please see a copy of my visit note below.      ENDOCRINOLOGY FOLLOW-UP         HISTORY OF PRESENT ILLNESS    Taylor Sanford is seen in follow-up.     At last visit 10/2023, we increased Trulicity dose to 1.5 mg daily and reduced glipizide XL to 10 mg daily.    She contacted our clinic in 6/2023 to report dry mouth and tongue swelling with increased dose of Trulicity: Had been noting more subtle symptoms with start of Trulicity that she did not correlate with the medication originally.  We therefore discontinue Trulicity altogether and increased glipizide XL back to 20 mg twice daily.    She notes today that she actually has not noticed any change in dry mouth or tongue swelling with discontinuation of Trulicity.  She is uncertain how long she has been noticing the symptoms.  She does not think tongue is overtly swollen, more that it feels uncomfortable and subjectively swollen.    Current diabetes regimen: Glipizide XL 10 mg twice daily.    Reviewed glucose data downloaded from meter.  Checking an average of 1.3 times per day.  Mostly checking in the morning so limited glucose data in the latter part of the day.  Average glucose 142.    Has been following with Dr. Enciso and podiatry: Ulceration in the third digit on the right foot had resolved at the time of follow-up earlier this month.  He recommended ongoing foot care/precautions and follow-up in podiatry as needed.    Recalls that she has started every 6-month injection (question Prolia) through [facility].    Pertinent endocrine and related history:  1.  Diabetes mellitus, type 2.  Diagnosed approximately in the late 1990s.  -Metformin had to be discontinued due to change in kidney function.  -Transitioned from Tradjenta to Trulicity in  1/2023.  2. History of amiodarone-induced thyroiditis. 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 Thyroid ultrasound 2011 showed heterogenous thyroid parenchyma: two subcentimeter thyroid nodules were noted, one in left and another in right lobe.  -Most recent thyroid US 9/24/2013 showed the following:  Right lobe spongiform 0.4 x 0.3  x 0.4 cm nodule in the midpole, previously measuring 0.3 x 0.2 cm.   Left lobe isoechoic nodule in the lower pole measuring 0.6 x 0.5 x 0.5 cm unchanged with little vascularity.   3. Chronic kidney disease.  4.  Osteoporosis.  Noted on DXA scan performed in the [facility] system in 2020.  History of right hip fracture after falling from standing height.  Has preferred to have osteoporosis managed by orthopedics and PCP.  5.  Atrial fibrillation.  6.  Coronary artery disease, status post PCI in 2002.  7.  Hyperlipidemia.  8.  Hypertension.  9.  Depression.    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  Had 2 children, a son and a daughter; her daughter passed away.    PAST MEDICAL HISTORY  Past Medical History:   Diagnosis Date     Benign essential hypertension      CAD (coronary artery disease)      Mixed hyperlipidemia      Paroxysmal atrial fibrillation (H)      Type 2 diabetes mellitus with skin complication, without long-term current use of insulin (H)        MEDICATIONS  Current Outpatient Medications   Medication Sig Dispense Refill     allopurinol (ZYLOPRIM) 100 MG tablet Take 200 mg by mouth       amLODIPine (NORVASC) 5 MG tablet amlodipine 5 mg tablet       aspirin (ASA) 81 MG chewable tablet Asprin Ec Low Dose       atorvastatin (LIPITOR) 80 MG tablet Take 80 mg by mouth       blood glucose (ACCU-CHEK ADIS PLUS) test strip 1 strip by In Vitro route  daily       buPROPion (WELLBUTRIN) 75 MG tablet One and a half tablets bid       Calcium Carb-Cholecalciferol (CALCIUM CARBONATE-VITAMIN D3) 600-400 MG-UNIT TABS Take 1 tablet by mouth 2 times daily       Calcium Citrate (CITRACAL OR) Take 1 tablet by mouth daily       cholestyramine (QUESTRAN) 4 g packet        cilostazol (PLETAL) 100 MG tablet Take 100 mg by mouth 2 times daily       furosemide (LASIX) 20 MG tablet 40 mg daily       gabapentin (NEURONTIN) 300 MG capsule Take 1 capsule by mouth 3 times daily       gabapentin (NEURONTIN) 300 MG capsule 300 mg 2 times daily       glipiZIDE (GLUCOTROL XL) 10 MG 24 hr tablet Take 1 tablet (10 mg) by mouth 2 times daily for 90 days 180 tablet 0     hyoscyamine (LEVSIN) 0.125 MG tablet Take 0.125 mg by mouth       lisinopril (ZESTRIL) 5 MG tablet 5 mg daily       magnesium 250 MG tablet 2 tablets 2 times daily       metFORMIN (GLUCOPHAGE XR) 500 MG 24 hr tablet        metoprolol succinate ER (TOPROL-XL) 50 MG 24 hr tablet 150 mg       mupirocin (BACTROBAN) 2 % external ointment Apply topically daily 30 g 3     nitroFURantoin macrocrystal-monohydrate (MACROBID) 100 MG capsule Take 100 mg by mouth daily       omeprazole (PRILOSEC) 20 MG DR capsule Take 20 mg by mouth daily       sodium fluoride dental gel (PREVIDENT) 1.1 % GEL topical gel        trospium (SANCTURA) 20 MG tablet        Vibegron (GEMTESA) 75 MG TABS tablet        warfarin ANTICOAGULANT (COUMADIN) 2 MG tablet warfarin 2 mg tablet         Allergies, family, and social history were reviewed and documented as needed in EHR.     REVIEW OF SYSTEMS  A focused ROS was performed, with pertinent positives and negatives as noted in the HPI.    PHYSICAL EXAM  /64 (BP Location: Right arm, Patient Position: Sitting, Cuff Size: Adult Large)   Pulse 72   Wt 95.3 kg (210 lb)   BMI 33.89 kg/m    Body mass index is 33.89 kg/m .  Constitutional: Vital signs reviewed, as recorded above. Patient is alert, oriented and  appears in no acute distress.  Eyes: PER, EOMI, no stare, lid lag, or retraction; no conjunctival injection.  Respiratory: Normal chest wall motion and respiratory effort.  ENTM: Mucous membranes clear, no tongue swelling, no angioedema.  MSK: No clubbing or cyanosis; normal muscle bulk and tone.  Skin: No lesions on visible skin,  Neurological: Alert and oriented times 3. No tremor.      DATA REVIEW  Each of the following laboratory and/or imaging studies were reviewed.      Component      Latest Ref Rng 7/26/2023  10:34 AM 7/26/2023  10:45 AM   Sodium      136 - 145 mmol/L 140     Potassium      3.4 - 5.3 mmol/L 5.3     Chloride      98 - 107 mmol/L 104     Carbon Dioxide (CO2)      22 - 29 mmol/L 26     Anion Gap      7 - 15 mmol/L 10     Urea Nitrogen      8.0 - 23.0 mg/dL 25.5 (H)     Creatinine      0.51 - 0.95 mg/dL 1.36 (H)     Calcium      8.8 - 10.2 mg/dL 9.0     Glucose      70 - 99 mg/dL 168 (H)     Alkaline Phosphatase      35 - 104 U/L 95     AST      0 - 45 U/L 27     ALT      0 - 50 U/L 19     Protein Total      6.4 - 8.3 g/dL 6.7     Albumin      3.5 - 5.2 g/dL 4.4     Bilirubin Total      <=1.2 mg/dL 0.5     GFR Estimate      >60 mL/min/1.73m2 39 (L)     Creatinine Urine      mg/dL  60.1    Albumin Urine mg/L      mg/L  <12.0    Albumin Urine mg/g Cr  --    Hemoglobin A1C      0.0 - 5.6 % 8.1 (H)        Legend:  (H) High  (L) Low        ASSESSMENT  1.  Diabetes mellitus, type 2.  Hemoglobin A1c checked prior to this visit indicates increasing hyperglycemia.  We have the option of either reintroducing Tradjenta or a low-dose of Trulicity--it appears the symptoms that we were concerned were related to Trulicity persist off the medication and she does not have objective tongue swelling on physical exam.  Patient prefers the latter approach so we will resume low-dose of Trulicity.  Continue glipizide without changes, although we discussed reducing dose if she notes hypoglycemia.  Also discussed  importance of stopping Trulicity and updating me if she notices worsening of tongue swelling or dry mouth.    2.  Diabetes preventive care.  -No known diabetic retinopathy, had annual eye exam at [facility] on 8/26/2022, no diabetic retinopathy  -CKD, in the past has preferred to defer nephrology referral but she is now open to evaluation so I will refer accordingly; negative urine microalbumin screen  on previsit labs on 7/26/2023.  I would consider SGLT2 inhibitor given CKD, although we would need to balance this with her coexisting urinary incontinence issues (would consider discussing SGLT2 inhibitor next visit).  -Has peripheral neuropathic symptoms; followed by Dr. Enciso for foot ulcers, discharged from his care in 7/2023 after ulcers healed (she was measured for diabetic shoes and inserts)    3.  Question glossitis.  I wonder if the tongue swelling she has been describing may be related to glossitis.  No overt swelling on today's exam.  B12 level in 2021 was low.  Recheck with next lab draw to see if supplementation is needed.    4.  Osteoporosis.  Based on DXA from 2020 and history of low trauma fracture.  Has preferred to follow-up on this with orthopedics and PCP.  Notes that she has been prescribed medication through [facility] (perhaps Prolia based on her description).  Deferred.    5.  Hypertension.  Blood pressure controlled.    6.  Hyperlipidemia.  On statin therapy.    7.  Coronary artery disease and paroxysmal atrial fibrillation.  Following in cardiology.    8.  History of hyperthyroidism. Clinically appears euthyroid.  Thyroid function tests checked in 7/2022 were normal.  Recheck with next lab draw.    9.  History of thyroid nodules. Subcentimeter and stable based on last US report from 2013.  Could consider follow-up US in the future, although we would need to discuss patient's interested in any invasive measures for testing/treatment before proceeding with ultrasound.    PLAN  -We  will fax order for lab draw to check B12 to [facility] labs in [place]  -Resume Trulicity at a lower dose of 0.75 mg weekly--stop if swelling of tongue or dry mouth worsen and update our clinic  -Keep well hydrated  -For now, continue glipizde 10 mg twice daily  -If noticing blood glucose is under 70 once or more a week, reduce glipizide to 10 mg once daily  -Check blood glucose once daily as much as possible at alternating times, first thing in the morning on one day and before dinner on another; also check with any unusual symptoms that could be due to low blood glucose  -Referral to nephrology  -Return for a follow-up visit in November, with labs before visit  -We will communicate results by letter, or if needed by phone       Orders Placed This Encounter   Procedures     Vitamin B12     Hemoglobin A1c     Comprehensive metabolic panel     TSH with free T4 reflex     Adult Nephrology  Referral       I spent a total of 42 minutes on the date of encounter reviewing medical records, evaluating the patient, coordinating care and documenting in the EHR, as detailed above.    Adriano Sampson MD   Division of Diabetes, Endocrinology and Metabolism  Department of Medicine      cc: Pelon Drew MD    Again, thank you for allowing me to participate in the care of your patient.        Sincerely,        ADRIANO Sampson MD

## 2023-08-02 NOTE — PROGRESS NOTES
ENDOCRINOLOGY FOLLOW-UP         HISTORY OF PRESENT ILLNESS    Taylor Sanford is seen in follow-up.     At last visit 10/2023, we increased Trulicity dose to 1.5 mg daily and reduced glipizide XL to 10 mg daily.    She contacted our clinic in 6/2023 to report dry mouth and tongue swelling with increased dose of Trulicity: Had been noting more subtle symptoms with start of Trulicity that she did not correlate with the medication originally.  We therefore discontinue Trulicity altogether and increased glipizide XL back to 20 mg twice daily.    She notes today that she actually has not noticed any change in dry mouth or tongue swelling with discontinuation of Trulicity.  She is uncertain how long she has been noticing the symptoms.  She does not think tongue is overtly swollen, more that it feels uncomfortable and subjectively swollen.    Current diabetes regimen: Glipizide XL 10 mg twice daily.    Reviewed glucose data downloaded from meter.  Checking an average of 1.3 times per day.  Mostly checking in the morning so limited glucose data in the latter part of the day.  Average glucose 142.    Has been following with Dr. Enciso and podiatry: Ulceration in the third digit on the right foot had resolved at the time of follow-up earlier this month.  He recommended ongoing foot care/precautions and follow-up in podiatry as needed.    Recalls that she has started every 6-month injection (question Prolia) through [facility].    Pertinent endocrine and related history:  1.  Diabetes mellitus, type 2.  Diagnosed approximately in the late 1990s.  -Metformin had to be discontinued due to change in kidney function.  -Transitioned from Tradjenta to Trulicity in 1/2023.  2. History of amiodarone-induced thyroiditis. 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  Thyroid ultrasound 2011 showed heterogenous thyroid parenchyma: two subcentimeter thyroid nodules were noted, one in left and another in right lobe.  -Most recent thyroid US 9/24/2013 showed the following:  Right lobe spongiform 0.4 x 0.3  x 0.4 cm nodule in the midpole, previously measuring 0.3 x 0.2 cm.   Left lobe isoechoic nodule in the lower pole measuring 0.6 x 0.5 x 0.5 cm unchanged with little vascularity.   3. Chronic kidney disease.  4.  Osteoporosis.  Noted on DXA scan performed in the [facility] system in 2020.  History of right hip fracture after falling from standing height.  Has preferred to have osteoporosis managed by orthopedics and PCP.  5.  Atrial fibrillation.  6.  Coronary artery disease, status post PCI in 2002.  7.  Hyperlipidemia.  8.  Hypertension.  9.  Depression.    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, lives alone.  Had 2 children, a son and a daughter; her daughter passed away.    PAST MEDICAL HISTORY  Past Medical History:   Diagnosis Date    Benign essential hypertension     CAD (coronary artery disease)     Mixed hyperlipidemia     Paroxysmal atrial fibrillation (H)     Type 2 diabetes mellitus with skin complication, without long-term current use of insulin (H)        MEDICATIONS  Current Outpatient Medications   Medication Sig Dispense Refill    allopurinol (ZYLOPRIM) 100 MG tablet Take 200 mg by mouth      amLODIPine (NORVASC) 5 MG tablet amlodipine 5 mg tablet      aspirin (ASA) 81 MG chewable tablet Asprin Ec Low Dose      atorvastatin (LIPITOR) 80 MG tablet Take 80 mg by mouth      blood glucose (ACCU-CHEK ADIS PLUS) test strip 1 strip by In Vitro route daily      buPROPion (WELLBUTRIN) 75 MG tablet One and a half tablets bid      Calcium Carb-Cholecalciferol (CALCIUM CARBONATE-VITAMIN D3) 600-400 MG-UNIT TABS Take 1 tablet by mouth 2 times daily      Calcium Citrate (CITRACAL OR) Take 1 tablet by mouth daily       cholestyramine (QUESTRAN) 4 g packet       cilostazol (PLETAL) 100 MG tablet Take 100 mg by mouth 2 times daily      furosemide (LASIX) 20 MG tablet 40 mg daily      gabapentin (NEURONTIN) 300 MG capsule Take 1 capsule by mouth 3 times daily      gabapentin (NEURONTIN) 300 MG capsule 300 mg 2 times daily      glipiZIDE (GLUCOTROL XL) 10 MG 24 hr tablet Take 1 tablet (10 mg) by mouth 2 times daily for 90 days 180 tablet 0    hyoscyamine (LEVSIN) 0.125 MG tablet Take 0.125 mg by mouth      lisinopril (ZESTRIL) 5 MG tablet 5 mg daily      magnesium 250 MG tablet 2 tablets 2 times daily      metFORMIN (GLUCOPHAGE XR) 500 MG 24 hr tablet       metoprolol succinate ER (TOPROL-XL) 50 MG 24 hr tablet 150 mg      mupirocin (BACTROBAN) 2 % external ointment Apply topically daily 30 g 3    nitroFURantoin macrocrystal-monohydrate (MACROBID) 100 MG capsule Take 100 mg by mouth daily      omeprazole (PRILOSEC) 20 MG DR capsule Take 20 mg by mouth daily      sodium fluoride dental gel (PREVIDENT) 1.1 % GEL topical gel       trospium (SANCTURA) 20 MG tablet       Vibegron (GEMTESA) 75 MG TABS tablet       warfarin ANTICOAGULANT (COUMADIN) 2 MG tablet warfarin 2 mg tablet         Allergies, family, and social history were reviewed and documented as needed in EHR.     REVIEW OF SYSTEMS  A focused ROS was performed, with pertinent positives and negatives as noted in the HPI.    PHYSICAL EXAM  /64 (BP Location: Right arm, Patient Position: Sitting, Cuff Size: Adult Large)   Pulse 72   Wt 95.3 kg (210 lb)   BMI 33.89 kg/m    Body mass index is 33.89 kg/m .  Constitutional: Vital signs reviewed, as recorded above. Patient is alert, oriented and appears in no acute distress.  Eyes: PER, EOMI, no stare, lid lag, or retraction; no conjunctival injection.  Respiratory: Normal chest wall motion and respiratory effort.  ENTM: Mucous membranes clear, no tongue swelling, no angioedema.  MSK: No clubbing or cyanosis; normal muscle bulk  and tone.  Skin: No lesions on visible skin,  Neurological: Alert and oriented times 3. No tremor.      DATA REVIEW  Each of the following laboratory and/or imaging studies were reviewed.      Component      Latest Ref Rng 7/26/2023  10:34 AM 7/26/2023  10:45 AM   Sodium      136 - 145 mmol/L 140     Potassium      3.4 - 5.3 mmol/L 5.3     Chloride      98 - 107 mmol/L 104     Carbon Dioxide (CO2)      22 - 29 mmol/L 26     Anion Gap      7 - 15 mmol/L 10     Urea Nitrogen      8.0 - 23.0 mg/dL 25.5 (H)     Creatinine      0.51 - 0.95 mg/dL 1.36 (H)     Calcium      8.8 - 10.2 mg/dL 9.0     Glucose      70 - 99 mg/dL 168 (H)     Alkaline Phosphatase      35 - 104 U/L 95     AST      0 - 45 U/L 27     ALT      0 - 50 U/L 19     Protein Total      6.4 - 8.3 g/dL 6.7     Albumin      3.5 - 5.2 g/dL 4.4     Bilirubin Total      <=1.2 mg/dL 0.5     GFR Estimate      >60 mL/min/1.73m2 39 (L)     Creatinine Urine      mg/dL  60.1    Albumin Urine mg/L      mg/L  <12.0    Albumin Urine mg/g Cr  --    Hemoglobin A1C      0.0 - 5.6 % 8.1 (H)        Legend:  (H) High  (L) Low        ASSESSMENT  1.  Diabetes mellitus, type 2.  Hemoglobin A1c checked prior to this visit indicates increasing hyperglycemia.  We have the option of either reintroducing Tradjenta or a low-dose of Trulicity--it appears the symptoms that we were concerned were related to Trulicity persist off the medication and she does not have objective tongue swelling on physical exam.  Patient prefers the latter approach so we will resume low-dose of Trulicity.  Continue glipizide without changes, although we discussed reducing dose if she notes hypoglycemia.  Also discussed importance of stopping Trulicity and updating me if she notices worsening of tongue swelling or dry mouth.    2.  Diabetes preventive care.  -No known diabetic retinopathy, had annual eye exam at [facility] on 8/26/2022, no diabetic retinopathy  -CKD, in the past has preferred to defer  nephrology referral but she is now open to evaluation so I will refer accordingly; negative urine microalbumin screen  on previsit labs on 7/26/2023.  I would consider SGLT2 inhibitor given CKD, although we would need to balance this with her coexisting urinary incontinence issues (would consider discussing SGLT2 inhibitor next visit).  -Has peripheral neuropathic symptoms; followed by Dr. Enciso for foot ulcers, discharged from his care in 7/2023 after ulcers healed (she was measured for diabetic shoes and inserts)    3.  Question glossitis.  I wonder if the tongue swelling she has been describing may be related to glossitis.  No overt swelling on today's exam.  B12 level in 2021 was low.  Recheck with next lab draw to see if supplementation is needed.    4.  Osteoporosis.  Based on DXA from 2020 and history of low trauma fracture.  Has preferred to follow-up on this with orthopedics and PCP.  Notes that she has been prescribed medication through [facility] (perhaps Prolia based on her description).  Deferred.    5.  Hypertension.  Blood pressure controlled.    6.  Hyperlipidemia.  On statin therapy.    7.  Coronary artery disease and paroxysmal atrial fibrillation.  Following in cardiology.    8.  History of hyperthyroidism. Clinically appears euthyroid.  Thyroid function tests checked in 7/2022 were normal.  Recheck with next lab draw.    9.  History of thyroid nodules. Subcentimeter and stable based on last US report from 2013.  Could consider follow-up US in the future, although we would need to discuss patient's interested in any invasive measures for testing/treatment before proceeding with ultrasound.    PLAN  -We will fax order for lab draw to check B12 to [facility] in [place]  -Resume Trulicity at a lower dose of 0.75 mg weekly--stop if swelling of tongue or dry mouth worsen and update our clinic  -Keep well hydrated  -For now, continue glipizde 10 mg twice daily  -If noticing blood  glucose is under 70 once or more a week, reduce glipizide to 10 mg once daily  -Check blood glucose once daily as much as possible at alternating times, first thing in the morning on one day and before dinner on another; also check with any unusual symptoms that could be due to low blood glucose  -Referral to nephrology  -Return for a follow-up visit in November, with labs before visit  -We will communicate results by letter, or if needed by phone       Orders Placed This Encounter   Procedures    Vitamin B12    Hemoglobin A1c    Comprehensive metabolic panel    TSH with free T4 reflex    Adult Nephrology  Referral       I spent a total of 42 minutes on the date of encounter reviewing medical records, evaluating the patient, coordinating care and documenting in the EHR, as detailed above.    Clarissa Sampson MD   Division of Diabetes, Endocrinology and Metabolism  Department of Medicine      cc: Pelon Drew MD

## 2023-08-04 ENCOUNTER — TELEPHONE (OUTPATIENT)
Dept: ENDOCRINOLOGY | Facility: CLINIC | Age: 83
End: 2023-08-04
Payer: COMMERCIAL

## 2023-08-04 NOTE — TELEPHONE ENCOUNTER
I called th margaux, there is not a Nephrologist in the area with [facility]. She would like the referral sent to [facility]. She will call and see if [facility] has a location near her with a Nephrologist and if so call us back with the fax number.

## 2023-08-04 NOTE — TELEPHONE ENCOUNTER
Per Patient calling back to provide the fax number to [facility] Fax: [phone] Attn: Nephrology. Please advise.

## 2023-08-04 NOTE — TELEPHONE ENCOUNTER
[facility] Call Center    Phone Message    May a detailed message be left on voicemail: yes     Reason for Call: Other: Referral   Taylor states she has a question about the referral, and would like a call back from the team to discuss. Please review and advise. Thank you.     Action Taken: Other: Endo    Travel Screening: Not Applicable

## 2023-08-25 ENCOUNTER — TELEPHONE (OUTPATIENT)
Dept: ENDOCRINOLOGY | Facility: CLINIC | Age: 83
End: 2023-08-25
Payer: COMMERCIAL

## 2023-08-25 NOTE — TELEPHONE ENCOUNTER
Called pt to check on status of B12 lab. Pt states she had an INR done on 8/1/23 and was under the assumption that they also héctor the B12 but no results found. Pt scheduled lab appt for 8/28/23. Orders are placed. Pt will verify with [facility] and cancel lab appt if already done.

## 2023-08-25 NOTE — TELEPHONE ENCOUNTER
----- Message from Clarissa Sampson MD sent at 8/24/2023 10:08 PM CDT -----    Patient was going to have B12 level checked in the [employer] system last week: I do not see it in Care Everywhere.  Would you be able to check with her if she ever had it drawn or, if she did not, to see if she would get it scheduled in the next few weeks.  Thank you.

## 2023-08-25 NOTE — TELEPHONE ENCOUNTER
[facility] Call Center    Phone Message    May a detailed message be left on voicemail: yes     Reason for Call: Other: Taylor called to informed she had a b12 check 08/08/23 on [facility] has these records and should be sending them over .       Action Taken: Other: endo    Travel Screening: Not Applicable

## 2023-09-18 ENCOUNTER — TELEPHONE (OUTPATIENT)
Dept: ENDOCRINOLOGY | Facility: CLINIC | Age: 83
End: 2023-09-18
Payer: COMMERCIAL

## 2023-09-18 DIAGNOSIS — E11.65 TYPE 2 DIABETES MELLITUS WITH HYPERGLYCEMIA, WITHOUT LONG-TERM CURRENT USE OF INSULIN (H): Primary | ICD-10-CM

## 2023-09-18 RX ORDER — LINAGLIPTIN 5 MG/1
5 TABLET, FILM COATED ORAL DAILY
Qty: 30 TABLET | Refills: 5 | Status: SHIPPED | OUTPATIENT
Start: 2023-09-18 | End: 2024-02-14 | Stop reason: ALTCHOICE

## 2023-09-18 NOTE — TELEPHONE ENCOUNTER
I called pt back to discuss. Pt states that she took her Trulicity last Sunday and notified some slight heartburn following dose. Pt took another dose yesterday and have severe heartburn the rest of the day. She found minimal relief with antacids. Denies any changes in diet.

## 2023-09-18 NOTE — TELEPHONE ENCOUNTER
[facility] Call Center    Phone Message    May a detailed message be left on voicemail: yes     Reason for Call: Other: patient had terrible heartburn yesterday from her Trulicity, please advise patient on what to do, thank you     Action Taken: Other: endo    Travel Screening: Not Applicable

## 2023-09-18 NOTE — TELEPHONE ENCOUNTER
Would recommend the following:  -Discontinue Trulicity   -Start Tradjenta 5 mg daily: I have sent prescription to pharmacy  -Continue glipizide without changes  -Follow-up in November as planned, with labs before visit

## 2023-11-01 ENCOUNTER — LAB (OUTPATIENT)
Dept: LAB | Facility: CLINIC | Age: 83
End: 2023-11-01
Payer: COMMERCIAL

## 2023-11-01 DIAGNOSIS — N18.32 CHRONIC KIDNEY DISEASE, STAGE 3B (H): ICD-10-CM

## 2023-11-01 DIAGNOSIS — E11.65 TYPE 2 DIABETES MELLITUS WITH HYPERGLYCEMIA, WITHOUT LONG-TERM CURRENT USE OF INSULIN (H): ICD-10-CM

## 2023-11-01 LAB
ALBUMIN SERPL BCG-MCNC: 4.2 G/DL (ref 3.5–5.2)
ALP SERPL-CCNC: 94 U/L (ref 35–104)
ALT SERPL W P-5'-P-CCNC: 17 U/L (ref 0–50)
ANION GAP SERPL CALCULATED.3IONS-SCNC: 11 MMOL/L (ref 7–15)
AST SERPL W P-5'-P-CCNC: 22 U/L (ref 0–45)
BILIRUB SERPL-MCNC: 0.6 MG/DL
BUN SERPL-MCNC: 30.8 MG/DL (ref 8–23)
CALCIUM SERPL-MCNC: 9.6 MG/DL (ref 8.8–10.2)
CHLORIDE SERPL-SCNC: 105 MMOL/L (ref 98–107)
CREAT SERPL-MCNC: 1.34 MG/DL (ref 0.51–0.95)
DEPRECATED HCO3 PLAS-SCNC: 27 MMOL/L (ref 22–29)
EGFRCR SERPLBLD CKD-EPI 2021: 40 ML/MIN/1.73M2
GLUCOSE SERPL-MCNC: 174 MG/DL (ref 70–99)
HBA1C MFR BLD: 7.7 % (ref 0–5.6)
POTASSIUM SERPL-SCNC: 5.2 MMOL/L (ref 3.4–5.3)
PROT SERPL-MCNC: 6.6 G/DL (ref 6.4–8.3)
SODIUM SERPL-SCNC: 143 MMOL/L (ref 135–145)
TSH SERPL DL<=0.005 MIU/L-ACNC: 2.01 UIU/ML (ref 0.3–4.2)

## 2023-11-01 PROCEDURE — 83036 HEMOGLOBIN GLYCOSYLATED A1C: CPT

## 2023-11-01 PROCEDURE — 84443 ASSAY THYROID STIM HORMONE: CPT

## 2023-11-01 PROCEDURE — 80053 COMPREHEN METABOLIC PANEL: CPT

## 2023-11-01 PROCEDURE — 36415 COLL VENOUS BLD VENIPUNCTURE: CPT

## 2023-11-01 NOTE — LETTER
Patient:  Taylor Sanford  :   1940  MRN:     [id number]        Ms.Marilyn ELAYNE Sanford  [address] [facility] [address]        2023    Dear ,    Thank you for having your follow-up lab draw: We were going to see each other in November but I see that you needed to cancel your appointment.  I hope you are well.    Your lab results show hemoglobin A1c is improving: Now at 7.7%.    Your thyroid function test is normal.    Your kidney function is overall stable.  Electrolytes and liver function tests are normal.    I look forward to seeing you in follow-up in February: Please have lab draw prior to that appointment (I have placed orders in the [employer] system).    Please contact our clinic sooner if more urgent questions, up.    Sincerely,  Clarissa Sampson MD        Resulted Orders   Hemoglobin A1c   Result Value Ref Range    Hemoglobin A1C 7.7 (H) 0.0 - 5.6 %      Comment:      Normal <5.7%   Prediabetes 5.7-6.4%    Diabetes 6.5% or higher     Note: Adopted from ADA consensus guidelines.   Comprehensive metabolic panel   Result Value Ref Range    Sodium 143 135 - 145 mmol/L      Comment:      Reference intervals for this test were updated on 2023 to more accurately reflect our healthy population. There may be differences in the flagging of prior results with similar values performed with this method. Interpretation of those prior results can be made in the context of the updated reference intervals.     Potassium 5.2 3.4 - 5.3 mmol/L    Carbon Dioxide (CO2) 27 22 - 29 mmol/L    Anion Gap 11 7 - 15 mmol/L    Urea Nitrogen 30.8 (H) 8.0 - 23.0 mg/dL    Creatinine 1.34 (H) 0.51 - 0.95 mg/dL    GFR Estimate 40 (L) >60 mL/min/1.73m2    Calcium 9.6 8.8 - 10.2 mg/dL    Chloride 105 98 - 107 mmol/L    Glucose 174 (H) 70 - 99 mg/dL    Alkaline Phosphatase 94 35 - 104 U/L    AST 22 0 - 45 U/L      Comment:      Reference intervals for this test were updated on 2023  to more accurately reflect our healthy population. There may be differences in the flagging of prior results with similar values performed with this method. Interpretation of those prior results can be made in the context of the updated reference intervals.    ALT 17 0 - 50 U/L      Comment:      Reference intervals for this test were updated on 6/12/2023 to more accurately reflect our healthy population. There may be differences in the flagging of prior results with similar values performed with this method. Interpretation of those prior results can be made in the context of the updated reference intervals.      Protein Total 6.6 6.4 - 8.3 g/dL    Albumin 4.2 3.5 - 5.2 g/dL    Bilirubin Total 0.6 <=1.2 mg/dL   TSH with free T4 reflex   Result Value Ref Range    TSH 2.01 0.30 - 4.20 uIU/mL

## 2023-11-14 ENCOUNTER — LAB REQUISITION (OUTPATIENT)
Dept: LAB | Facility: CLINIC | Age: 83
End: 2023-11-14
Payer: COMMERCIAL

## 2023-11-14 DIAGNOSIS — D63.1 ANEMIA IN CHRONIC KIDNEY DISEASE (CODE): ICD-10-CM

## 2023-11-14 DIAGNOSIS — I50.22 CHRONIC SYSTOLIC (CONGESTIVE) HEART FAILURE (H): ICD-10-CM

## 2023-11-15 LAB
ANION GAP SERPL CALCULATED.3IONS-SCNC: 10 MMOL/L (ref 7–15)
BUN SERPL-MCNC: 23.8 MG/DL (ref 8–23)
CALCIUM SERPL-MCNC: 9.3 MG/DL (ref 8.8–10.2)
CHLORIDE SERPL-SCNC: 100 MMOL/L (ref 98–107)
CREAT SERPL-MCNC: 1.32 MG/DL (ref 0.51–0.95)
DEPRECATED HCO3 PLAS-SCNC: 29 MMOL/L (ref 22–29)
EGFRCR SERPLBLD CKD-EPI 2021: 40 ML/MIN/1.73M2
ERYTHROCYTE [DISTWIDTH] IN BLOOD BY AUTOMATED COUNT: 19.8 % (ref 10–15)
GLUCOSE SERPL-MCNC: 102 MG/DL (ref 70–99)
HCT VFR BLD AUTO: 28.7 % (ref 35–47)
HGB BLD-MCNC: 8.2 G/DL (ref 11.7–15.7)
MCH RBC QN AUTO: 23.2 PG (ref 26.5–33)
MCHC RBC AUTO-ENTMCNC: 28.6 G/DL (ref 31.5–36.5)
MCV RBC AUTO: 81 FL (ref 78–100)
PLATELET # BLD AUTO: 376 10E3/UL (ref 150–450)
POTASSIUM SERPL-SCNC: 4.1 MMOL/L (ref 3.4–5.3)
RBC # BLD AUTO: 3.53 10E6/UL (ref 3.8–5.2)
SODIUM SERPL-SCNC: 139 MMOL/L (ref 135–145)
WBC # BLD AUTO: 8 10E3/UL (ref 4–11)

## 2023-11-15 PROCEDURE — P9604 ONE-WAY ALLOW PRORATED TRIP: HCPCS | Mod: ORL

## 2023-11-15 PROCEDURE — 85027 COMPLETE CBC AUTOMATED: CPT | Mod: ORL | Performed by: INTERNAL MEDICINE

## 2023-11-15 PROCEDURE — 80048 BASIC METABOLIC PNL TOTAL CA: CPT | Mod: ORL

## 2023-11-15 PROCEDURE — 36415 COLL VENOUS BLD VENIPUNCTURE: CPT | Mod: ORL | Performed by: INTERNAL MEDICINE

## 2023-11-15 PROCEDURE — P9604 ONE-WAY ALLOW PRORATED TRIP: HCPCS | Mod: ORL | Performed by: INTERNAL MEDICINE

## 2023-11-15 PROCEDURE — 85027 COMPLETE CBC AUTOMATED: CPT | Mod: ORL

## 2023-11-15 PROCEDURE — 80048 BASIC METABOLIC PNL TOTAL CA: CPT | Mod: ORL | Performed by: INTERNAL MEDICINE

## 2023-11-15 PROCEDURE — 36415 COLL VENOUS BLD VENIPUNCTURE: CPT | Mod: ORL

## 2023-12-04 DIAGNOSIS — E11.65 TYPE 2 DIABETES MELLITUS WITH HYPERGLYCEMIA, WITHOUT LONG-TERM CURRENT USE OF INSULIN (H): ICD-10-CM

## 2023-12-04 RX ORDER — GLIPIZIDE 10 MG/1
10 TABLET, FILM COATED, EXTENDED RELEASE ORAL 2 TIMES DAILY
Qty: 180 TABLET | Refills: 0 | Status: SHIPPED | OUTPATIENT
Start: 2023-12-04

## 2023-12-04 NOTE — TELEPHONE ENCOUNTER
"      Requested Prescriptions   Pending Prescriptions Disp Refills    glipiZIDE (GLUCOTROL XL) 10 MG 24 hr tablet 180 tablet 1     Sig: Take 1 tablet (10 mg) by mouth 2 times daily       Sulfonylurea Agents Failed - 12/4/2023 12:05 PM        Failed - Patient has a recent creatinine (normal) within the past 12 mos.     Recent Labs   Lab Test 11/15/23  0709   CR 1.32*       Ok to refill medication if creatinine is low          Passed - Patient has documented A1c within the specified period of time.     If HgbA1C is 8 or greater, it needs to be on file within the past 3 months.  If less than 8, must be on file within the past 6 months.     Recent Labs   Lab Test 11/01/23  1105   A1C 7.7*             Passed - Medication is active on med list        Passed - Patient is age 18 or older        Passed - No active pregnancy on record        Passed - Patient has not had a positive pregnancy test within the past 12 mos.        Passed - Recent (6 mo) or future (30 days) visit within the authorizing provider's specialty     Patient had office visit in the last 6 months or has a visit in the next 30 days with authorizing provider or within the authorizing provider's specialty.  See \"Patient Info\" tab in inbasket, or \"Choose Columns\" in Meds & Orders section of the refill encounter.                 "

## 2023-12-22 NOTE — TELEPHONE ENCOUNTER
Based on this glucose data, can remain off acarbose and continue Tradjenta and glipizide.  Follow-up as planned in January, with labs before visit.  Based on A1c and her glucose data at that time we can decide if we need to make any other changes.    Thank you    
Lm for pt to c/b     Per notes from 10/19/22:  Can try holding Acarbose for 1 week to see if gastrointestinal symptoms improve; if improved, can remain off Acarbose (in that case, please call clinic after 1 month and report glucose values)  -If gastrointestinal symptoms do not improve after 1 week off Acarbose, can resume Acarbose 25 mg with first bite of breakfast and dinner    Did patient's GI symptoms resolve when she came off the Acarbose?  Need 7 days of BG numbers.  
[facility] Call Center    Phone Message    May a detailed message be left on voicemail: yes     Reason for Call: Other: Per patient blood sugar levels have started to be elevated again after stopping the   acarbose (PRECOSE) 25 MG tablet she wants to know if she should get back on it?    Action Taken: Other: ENDO    Travel Screening: Not Applicable                                                                      
Spoke to pt and informed of below. Pt verbalized understanding.   
Spoke to pt she did hold medication and GI symptoms did improve. Pt forgot to call to check in for blood sugar numbers.    Pts recent BG numbers, pt states all numbers were taken in the morning.    12/8-  160    12/7  163    12/6  158    12/5  163    12/4  142    12/3  140    12/2  139    12/1  129    11/30  139    11/29  121    11/28  143    Pt is going to a doctors appt, when returning call please call later and if does not answer okay to leave message  
clear

## 2024-02-08 ENCOUNTER — LAB (OUTPATIENT)
Dept: LAB | Facility: CLINIC | Age: 84
End: 2024-02-08
Payer: COMMERCIAL

## 2024-02-08 DIAGNOSIS — N18.32 CHRONIC KIDNEY DISEASE, STAGE 3B (H): ICD-10-CM

## 2024-02-08 DIAGNOSIS — E11.65 TYPE 2 DIABETES MELLITUS WITH HYPERGLYCEMIA, WITHOUT LONG-TERM CURRENT USE OF INSULIN (H): ICD-10-CM

## 2024-02-08 LAB
ANION GAP SERPL CALCULATED.3IONS-SCNC: 10 MMOL/L (ref 7–15)
BUN SERPL-MCNC: 25.4 MG/DL (ref 8–23)
CALCIUM SERPL-MCNC: 9.6 MG/DL (ref 8.8–10.2)
CHLORIDE SERPL-SCNC: 104 MMOL/L (ref 98–107)
CREAT SERPL-MCNC: 1.23 MG/DL (ref 0.51–0.95)
DEPRECATED HCO3 PLAS-SCNC: 26 MMOL/L (ref 22–29)
EGFRCR SERPLBLD CKD-EPI 2021: 44 ML/MIN/1.73M2
GLUCOSE SERPL-MCNC: 211 MG/DL (ref 70–99)
HBA1C MFR BLD: 9.1 % (ref 0–5.6)
POTASSIUM SERPL-SCNC: 4.7 MMOL/L (ref 3.4–5.3)
SODIUM SERPL-SCNC: 140 MMOL/L (ref 135–145)

## 2024-02-08 PROCEDURE — 80048 BASIC METABOLIC PNL TOTAL CA: CPT

## 2024-02-08 PROCEDURE — 83036 HEMOGLOBIN GLYCOSYLATED A1C: CPT

## 2024-02-08 PROCEDURE — 36415 COLL VENOUS BLD VENIPUNCTURE: CPT

## 2024-02-14 ENCOUNTER — TELEPHONE (OUTPATIENT)
Dept: ENDOCRINOLOGY | Facility: CLINIC | Age: 84
End: 2024-02-14

## 2024-02-14 ENCOUNTER — OFFICE VISIT (OUTPATIENT)
Dept: ENDOCRINOLOGY | Facility: CLINIC | Age: 84
End: 2024-02-14
Payer: COMMERCIAL

## 2024-02-14 VITALS
DIASTOLIC BLOOD PRESSURE: 62 MMHG | WEIGHT: 203 LBS | HEART RATE: 84 BPM | SYSTOLIC BLOOD PRESSURE: 110 MMHG | BODY MASS INDEX: 32.77 KG/M2

## 2024-02-14 DIAGNOSIS — E04.2 MULTIPLE THYROID NODULES: ICD-10-CM

## 2024-02-14 DIAGNOSIS — N18.32 CHRONIC KIDNEY DISEASE, STAGE 3B (H): ICD-10-CM

## 2024-02-14 DIAGNOSIS — E11.65 TYPE 2 DIABETES MELLITUS WITH HYPERGLYCEMIA, WITHOUT LONG-TERM CURRENT USE OF INSULIN (H): Primary | ICD-10-CM

## 2024-02-14 PROCEDURE — 99215 OFFICE O/P EST HI 40 MIN: CPT | Performed by: INTERNAL MEDICINE

## 2024-02-14 RX ORDER — TIRZEPATIDE 2.5 MG/.5ML
2.5 INJECTION, SOLUTION SUBCUTANEOUS
Qty: 2 ML | Refills: 1 | Status: SHIPPED | OUTPATIENT
Start: 2024-02-14 | End: 2024-03-26 | Stop reason: SINTOL

## 2024-02-14 RX ORDER — LOPERAMIDE HCL 2 MG
2 CAPSULE ORAL DAILY
COMMUNITY
Start: 2023-02-14

## 2024-02-14 NOTE — TELEPHONE ENCOUNTER
PA Initiation    Medication: MOUNJARO 2.5 MG/0.5ML SC SOPN  Insurance Company: [employer] - Phone [phone] Fax [phone]  Pharmacy Filling the Rx: [id number] - [place], [place] - [address] [place] [address] E AT [place] [address] & [facility]  Filling Pharmacy Phone: [phone]  Filling Pharmacy Fax: [phone]  Start Date: 2/14/2024

## 2024-02-14 NOTE — LETTER
2/14/2024         RE: Taylor Sanford  [address] [facility] [address]        Dear Colleague,    Thank you for referring your patient, Taylor Sanford, to the [facility]. Please see a copy of my visit note below.      ENDOCRINOLOGY FOLLOW-UP         HISTORY OF PRESENT ILLNESS    Taylor Sanford is seen in follow-up.     Recently discovered to have mildly displaced fracture of the right clavicle on CT on 1/20/2024 after developing chest pain after a fall; CT also incidentally revealed a complex appearing lesion in the upper pole of the left kidney.  Renal ultrasound performed on 2/1/2024 showed a hypoechoic lesion with some internal complexity, unclear if cyst.  Renal MRI was suggested by radiology.    We resumed Trulicity at last visit in 8/2023.  However, patient contacted our clinic in 9/2023 with progressing GI symptoms (heartburn) after resuming Trulicity.  We therefore discontinued Trulicity and started Tradjenta at that time.    Current diabetes regimen: Glipizide XL 10 mg twice daily, Tradjenta 5 mg daily.    Reviewed glucose data downloaded from meter.  In the past 2 weeks, average glucose has been 171.      Reports that she continues on Prolia through [facility].    Pertinent endocrine and related history:  1.  Diabetes mellitus, type 2.  Diagnosed approximately in the late 1990s.  -Metformin had to be discontinued due to change in kidney function.  -Transitioned from Tradjenta to Trulicity in 1/2023.  2. History of amiodarone-induced thyroiditis. 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 Thyroid ultrasound 2011 showed heterogenous thyroid parenchyma: two subcentimeter thyroid nodules were noted, one in left and another in right lobe.  -Most recent thyroid US 9/24/2013 showed the  following:  Right lobe spongiform 0.4 x 0.3  x 0.4 cm nodule in the midpole, previously measuring 0.3 x 0.2 cm.   Left lobe isoechoic nodule in the lower pole measuring 0.6 x 0.5 x 0.5 cm unchanged with little vascularity.   3. Chronic kidney disease.  4.  Osteoporosis.  Noted on DXA scan performed in the [facility] system in 2020.  History of right hip fracture after falling from standing height.  Has preferred to have osteoporosis managed by orthopedics and PCP.  5.  Atrial fibrillation.  6.  Coronary artery disease, status post PCI in 2002.  7.  Hyperlipidemia.  8.  Hypertension.  9.  Depression.    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  Had 2 children, a son and a daughter; her daughter passed away.    PAST MEDICAL HISTORY  Past Medical History:   Diagnosis Date     Benign essential hypertension      CAD (coronary artery disease)      Mixed hyperlipidemia      Paroxysmal atrial fibrillation (H)      Type 2 diabetes mellitus with skin complication, without long-term current use of insulin (H)        MEDICATIONS  Current Outpatient Medications   Medication Sig Dispense Refill     cholecalciferol 50 MCG (2000 UT) CAPS Take 2,000 Units by mouth daily       glipiZIDE (GLUCOTROL XL) 10 MG 24 hr tablet Take 1 tablet (10 mg) by mouth 2 times daily 180 tablet 0     loperamide (IMODIUM) 2 MG capsule Take 2 mg by mouth daily       tirzepatide (MOUNJARO) 2.5 MG/0.5ML pen Inject 2.5 mg Subcutaneous every 7 days 2 mL 1     Vibegron (GEMTESA) 75 MG TABS tablet        warfarin ANTICOAGULANT (COUMADIN) 2 MG tablet warfarin 2 mg tablet       allopurinol (ZYLOPRIM) 100 MG tablet Take 200 mg by mouth       amLODIPine (NORVASC) 5 MG tablet amlodipine 5 mg tablet       aspirin (ASA) 81 MG chewable tablet Asprin Ec Low Dose       atorvastatin (LIPITOR) 80 MG tablet Take 80 mg by mouth       blood glucose (ACCU-CHEK ADIS PLUS) test strip 1 strip by In Vitro route  daily       buPROPion (WELLBUTRIN) 75 MG tablet One and a half tablets bid       Calcium Carb-Cholecalciferol (CALCIUM CARBONATE-VITAMIN D3) 600-400 MG-UNIT TABS Take 1 tablet by mouth 2 times daily       Calcium Citrate (CITRACAL OR) Take 1 tablet by mouth daily       cilostazol (PLETAL) 100 MG tablet Take 100 mg by mouth 2 times daily       furosemide (LASIX) 20 MG tablet 40 mg daily       gabapentin (NEURONTIN) 300 MG capsule Take 1 capsule by mouth 3 times daily Two tablets in the morning and one tablet in the evening       hyoscyamine (LEVSIN) 0.125 MG tablet Take 0.125 mg by mouth       lisinopril (ZESTRIL) 5 MG tablet 5 mg daily       magnesium 250 MG tablet 2 tablets 2 times daily       metoprolol succinate ER (TOPROL-XL) 50 MG 24 hr tablet 150 mg       mupirocin (BACTROBAN) 2 % external ointment Apply topically daily 30 g 3     nitroFURantoin macrocrystal-monohydrate (MACROBID) 100 MG capsule Take 100 mg by mouth daily       omeprazole (PRILOSEC) 20 MG DR capsule Take 20 mg by mouth daily       sodium fluoride dental gel (PREVIDENT) 1.1 % GEL topical gel          Allergies, family, and social history were reviewed and documented as needed in EHR.     REVIEW OF SYSTEMS  A focused ROS was performed, with pertinent positives and negatives as noted in the HPI.    PHYSICAL EXAM  /62 (BP Location: Right arm, Patient Position: Sitting, Cuff Size: Adult Regular)   Pulse 84   Wt 92.1 kg (203 lb)   BMI 32.77 kg/m    Body mass index is 32.77 kg/m .  Constitutional: Vital signs reviewed, as recorded above. Patient is alert, oriented and appears in no acute distress.  Eyes: PER, EOMI, no stare, lid lag, or retraction; no conjunctival injection.  Respiratory: Normal chest wall motion and respiratory effort.  ENTM: Mucous membranes clear, no tongue swelling, no angioedema.  MSK: No clubbing or cyanosis; normal muscle bulk and tone.  Skin: No lesions on visible skin,  Neurological: Alert and oriented times 3. No  tremor.      DATA REVIEW  Each of the following laboratory and/or imaging studies were reviewed.            ASSESSMENT  1.  Diabetes mellitus, type 2.  Hemoglobin A1c checked prior to this visit has risen significantly and fingerstick glucose values are outside of ideal range.  Remains hesitant to use insulin.  Her kidney function limits our options: I would consider challenging with GIP/GLP-1 RA (she has had side effect with Trulicity in the past but may be able to tolerate lower dosages of Mounjaro).  We reviewed potential side effects and importance of keeping well-hydrated with this medication.  Ms. Sanford is agreeable to moving forward.    2.  Diabetes preventive care.  -No known diabetic retinopathy, [facility], no diabetic retinopathy  -CKD, I referred to nephrology at last visit in 9 8/2023 however patient notes that this was delayed after her fracture and when she reached out to reschedule, nephrology denied appointment; for now, I will defer rereferral since she is currently undergoing workup for renal mass.  I had considered SGLT2 inhibitor but we did not start immediately given her urinary continence issues.  Defer for now.  -Has peripheral neuropathic symptoms; followed by Dr. Enciso for foot ulcers, discharged from his care in 7/2023 after ulcers healed (she was measured for diabetic shoes and inserts)    3.  Osteoporosis.  Based on DXA from 2020 and history of low trauma fractures.  Followed in orthopedics clinic.  Therefore deferred.    5.  Hypertension.  Blood pressure controlled.    6.  Hyperlipidemia.  On statin therapy.    7.  Coronary artery disease and paroxysmal atrial fibrillation.  Following in cardiology.    8.  History of hyperthyroidism. Clinically appears euthyroid.  Thyroid function test checked in 11/2023 was normal.    9.  History of thyroid nodules. Subcentimeter and stable based on last US report from 2013.  We will request follow-up  ultrasound prior to next visit.    PLAN  -We will start Mounjaro 2.5 mg weekly  -When starting Mounjaro, stop Tradjenta  -Continue glipizide without changes, 10 mg twice daily  -Visit with diabetes education in about 1 month to review blood glucose; at that time, we can consider increasing Mounjaro dose  -Check blood glucose once daily as much as possible at alternating times, first thing in the morning on one day and before dinner on another; also check with any unusual symptoms that could be due to low blood glucose  -Return for a follow-up visit in summer 2024, with labs and thyroid ultrasound before visit  -We will communicate results by letter, or if needed by phone       Orders Placed This Encounter   Procedures     Basic metabolic panel     Hemoglobin A1c     Adult Diabetes Education  Referral       I spent a total of 48 minutes on the date of encounter reviewing medical records, evaluating the patient, coordinating care and documenting in the EHR, as detailed above.    Adriano Sampson MD   Division of Diabetes, Endocrinology and Metabolism  Department of Medicine      cc: Pelon Drew MD    Again, thank you for allowing me to participate in the care of your patient.        Sincerely,        ADRIANO Sampson MD

## 2024-02-14 NOTE — PATIENT INSTRUCTIONS
-We will start Mounjaro 2.5 mg weekly  -When starting Mounjaro, stop Tradjenta  -Continue glipizide without changes, 10 mg twice daily  -Visit with diabetes education in about 1 month to review blood glucose; at that time, we can consider increasing Mounjaro dose  -Check blood glucose once daily as much as possible at alternating times, first thing in the morning on one day and before dinner on another; also check with any unusual symptoms that could be due to low blood glucose  -Return for a follow-up visit in summer 2024, with labs and thyroid ultrasound before visit  -We will communicate results by letter, or if needed by phone

## 2024-02-15 NOTE — TELEPHONE ENCOUNTER
Brief Operative Note  Patient Name: Liz Magaña  MRN: [id number]  YOB: 1978  Admit Date: 6/20/2023  #0  Date of Procedure: 06/20/2023     Procedure: Laparoscopic cholecystectomy     Surgeon(s) and Role:  Staff: Dr. Astorga   Resident(s): Tona Doshi MD PGY4    Pre-Operative Diagnosis: Acute cholecystitis     Post-Operative Diagnosis: Same    Anesthesia: GETA    Operative Findings: Acutely inflamed gallbladder with thickened rind     Description of Technical Procedures: Refer to full dictated operative report    Estimated Blood Loss (EBL): 50 cc           Implants: None    Drains: None    Specimens: Gallbladder     Complications: None             Condition: Good     Disposition: Hospital jernigan       PA Approved for Mounjaro. Order sent to pharmacy. Closing encounter

## 2024-02-15 NOTE — TELEPHONE ENCOUNTER
Prior Authorization Approval    Medication: MOUNJARO 2.5 MG/0.5ML SC SOPN  Authorization Effective Date: 2/15/2024  Authorization Expiration Date: 2/15/2025  Approved Dose/Quantity: 1 month  Reference #: [id number] [id number]   Insurance Company: [employer] - Phone [phone] Fax [phone]  Expected CoPay: $    CoPay Card Available:      Financial Assistance Needed: n/a  Which Pharmacy is filling the prescription: [id number] - [place], [place] - [address] [place] [address] E AT [place] [address] & [facility]  Pharmacy Notified: order sent to pharmacy  Patient Notified: pharmacy to call pt when ready

## 2024-03-11 ENCOUNTER — ALLIED HEALTH/NURSE VISIT (OUTPATIENT)
Dept: EDUCATION SERVICES | Facility: CLINIC | Age: 84
End: 2024-03-11
Payer: COMMERCIAL

## 2024-03-11 DIAGNOSIS — E11.65 TYPE 2 DIABETES MELLITUS WITH HYPERGLYCEMIA, WITHOUT LONG-TERM CURRENT USE OF INSULIN (H): ICD-10-CM

## 2024-03-11 PROCEDURE — G0108 DIAB MANAGE TRN  PER INDIV: HCPCS

## 2024-03-11 NOTE — LETTER
"    3/11/2024         RE: Taylor Sanford  [address]  [facility] [address]        Dear Colleague,    Thank you for referring your patient, Taylor Sanford, to the [facility]. Please see a copy of my visit note below.    Diabetes Self-Management Education & Support    Presents for: Individual review    Type of Service: In Person Visit      ASSESSMENT:    Patient seen today for DM edu.   She started Mounjaro on 24, took her 4th injection yesterday. Pt reports she is tolerating fine and denies any side effects. She stopped tradjenta and is still taking Glipizide XL 10 mg BID.     She brings in BG meter, readings starting with most recent.     FB, 138, 124, 71, 117, 73, 75, 81, 130, 66, 117, 61, 64, 96, 119, 95, 83, 96, 117, 126, 102, 117, 114  11pm: 66  2pm: 153   4pm: 125, 126, 194, 118    Readings prior to 24 (prior to starting Mounjaro)    FB, 160, 140, 1445, 156, 140, 150,171, 177, 137, 170    2pm: 227, 165  4pm: 179, 207, 243    Discussed significant improvement with Mounjaro. Also discussed low BG. Pt reports she has felt completely fine. She states \"only once, about a week ago I felt just a little shaky.\" She states all other times her readings have been in the 60's and 70's she has felt fine. Reviewed signs and symptoms of hypoglycemia and treatment. Pt notes the time she did feel shaky, it was not difficult to treat.     Pt report she is eating oatmeal and fruit for breakfast, usually cottage cheese and fruit for lunch and dinner can vary but typically includes a protein and vegetable.     She is using a walking today, limited mobility.     Discussed concerns over low BG readings and possibly decreasing glipizide. Pt states she would \" Can't I just add a snack at bedtime, I can do that instead.\" Advised we could try that. However, if low BG continue or readings continue to show up below 70 she is to let CDE know. She states she will. Also " "discussed not going long periods w/o eating, not skipping lunch. Pt verbalized understanding.       Patient's most recent   Lab Results   Component Value Date    A1C 9.1 02/08/2024     is not meeting goal of <8.0    Diabetes knowledge and skills assessment:   Patient is knowledgeable in diabetes management concepts related to: Healthy Eating, Being Active, Monitoring, Taking Medication, Problem Solving, Reducing Risks, and Healthy Coping    Continue education with the following diabetes management concepts: all as needed     Based on learning assessment above, most appropriate setting for further diabetes education would be: Individual setting.      PLAN    Doing well!   Add bedtime snack.   Do not go long periods without eating or skip meals.   If readings continue <80 to let CDE know. Pt agrees and was given phone number.     Follow-up: 5/9/24 as scheduled, will have A1c done that day as well     See Care Plan for co-developed, patient-state behavior change goals.  AVS provided for patient today.    SUBJECTIVE/OBJECTIVE:  Presents for: Individual review  Accompanied by: Self  Diabetes education in the past 24mo: No  Diabetes type: Type 2  Date of diagnosis: around 20 years per patient  Disease course: Improving  Difficulty affording diabetes medication?: No  Cultural Influences/Ethnic Background:  Not  or     Diabetes Symptoms & Complications:  Disease course: Improving       Patient Problem List and Family Medical History reviewed for relevant medical history, current medical status, and diabetes risk factors.    Vitals:  There were no vitals taken for this visit.  Estimated body mass index is 32.77 kg/m  as calculated from the following:    Height as of 7/12/23: 1.676 m (5' 6\").    Weight as of 2/14/24: 92.1 kg (203 lb).   Last 3 BP:   BP Readings from Last 3 Encounters:   02/14/24 110/62   08/02/23 118/64   07/12/23 (!) 140/70       History   Smoking Status     Never   Smokeless Tobacco     Never " "      Labs:  Lab Results   Component Value Date    A1C 9.1 02/08/2024     Lab Results   Component Value Date     02/08/2024     12/31/2019     No results found for: \"LDL\"  No results found for: \"HDL\"]  GFR Estimate   Date Value Ref Range Status   02/08/2024 44 (L) >60 mL/min/1.73m2 Final   12/31/2019 49 (L) >60 mL/min/1.73m2 Final     GFR Estimate If Black   Date Value Ref Range Status   12/31/2019 59 (L) >60 mL/min/1.73m2 Final     Lab Results   Component Value Date    CR 1.23 02/08/2024     No results found for: \"MICROALBUMIN\"    Healthy Eating:  Healthy Eating Assessed Today: Yes  Meals include: Breakfast, Dinner  Beverages: Water    Being Active:  Being Active Assessed Today: Yes  Barrier to exercise: Physical limitation    Monitoring:  Monitoring Assessed Today: Yes  Did patient bring glucose meter to appointment? : Yes  Blood Glucose Meter: Accu-chek  Times checking blood sugar at home (number): 1  Times checking blood sugar at home (per): Day      Taking Medications:  Diabetes Medication(s)       Sulfonylureas       glipiZIDE (GLUCOTROL XL) 10 MG 24 hr tablet Take 1 tablet (10 mg) by mouth 2 times daily       Incretin Mimetic Agents       tirzepatide (MOUNJARO) 2.5 MG/0.5ML pen Inject 2.5 mg Subcutaneous every 7 days            Taking Medication Assessed Today: Yes  Current Treatments: Oral Medication (taken by mouth), Non-insulin Injectables  Problems taking diabetes medications regularly?: No  Diabetes medication side effects?: No         Healthy Coping:  Healthy Coping Assessed Today: Yes  Emotional response to diabetes: Ready to learn  Informal Support system:: Family, Friends  Stage of change: ACTION (Actively working towards change)  Patient Activation Measure Survey Score:       No data to display                  Care Plan and Education Provided:  Care Plan: Diabetes   Updates made by María Putnam, RN since 3/11/2024 12:00 AM        Problem: HbA1C Not In Goal         Goal: Establish " Regular Follow-Ups with PCP         Task: Discuss with PCP the recommended timing for patient's next follow up visit(s)    Responsible User: María Putnam RN        Task: Discuss schedule for PCP visits with patient Completed 3/11/2024   Responsible User: María Putnam RN        Goal: Get HbA1C Level in Goal         Task: Educate patient on diabetes education self-management topics Completed 3/11/2024   Responsible User: María Putnam RN        Task: Educate patient on benefits of regular glucose monitoring Completed 3/11/2024   Responsible User: María Putnam RN        Task: Refer patient to appropriate extended care team member, as needed (Medication Therapy Management, Behavioral Health, Physical Therapy, etc.)    Responsible User: María Putnam RN        Task: Discuss diabetes treatment plan with patient Completed 3/11/2024   Responsible User: María Putnam RN        Problem: Diabetes Self-Management Education Needed to Optimize Self-Care Behaviors         Goal: Understand diabetes pathophysiology and disease progression         Task: Provide education on diabetes pathophysiology and disease progression specfic to patient's diabetes type Completed 3/11/2024   Responsible User: María Putnam RN        Goal: Healthy Eating - follow a healthy eating pattern for diabetes    Note:    Add snack at bedtime        Task: Provide education on portion control and consistency in amount, composition and timing of food intake Completed 3/11/2024   Responsible User: María Putnam RN        Task: Provide education on managing carbohydrate intake (carbohydrate counting, plate planning method, etc.) Completed 3/11/2024   Responsible User: María Putnam RN        Task: Provide education on weight management    Responsible User: María Putnam RN        Task: Provide education on heart healthy eating    Responsible User: María Putnam RN        Task: Provide education on eating out    Responsible User: [place]  DOMINIQUE Daniel        Task: Develop individualized healthy eating plan with patient    Responsible User: María Putnam RN        Goal: Being Active - get regular physical activity, working up to at least 150 minutes per week         Task: Provide education on relationship of activity to glucose and precautions to take if at risk for low glucose    Responsible User: María Putnam RN        Task: Discuss barriers to physical activity with patient Completed 3/11/2024   Responsible User: María Putnam RN        Task: Develop physical activity plan with patient    Responsible User: María Putnam RN        Task: Explore community resources including walking groups, assistance programs, and home videos    Responsible User: María Putnam RN        Goal: Monitoring - monitor glucose and ketones as directed    Note:    Check BG 1-2x/day        Task: Provide education on blood glucose monitoring (purpose, proper technique, frequency, glucose targets, interpreting results, when to use glucose control solution, sharps disposal) Completed 3/11/2024   Responsible User: María Putnam RN        Task: Provide education on continuous glucose monitoring (sensor placement, use of krish or /reader, understanding glucose trends, alerts and alarms, differences between sensor glucose and blood glucose)    Responsible User: María Putnam RN        Task: Provide education on ketone monitoring (when to monitor, frequency, etc.)    Responsible User: María Putnam RN        Goal: Taking Medication - patient is consistently taking medications as directed    Note:    Take medications as prescribed        Task: Provide education on action of prescribed medication, including when to take and possible side effects Completed 3/11/2024   Responsible User: María Putnam RN        Task: Provide education on insulin and injectable diabetes medications, including administration, storage, site selection and rotation for injection sites  Completed 3/11/2024   Responsible User: María Putnam RN        Task: Discuss barriers to medication adherence with patient and provide management technique ideas as appropriate    Responsible User: María Putnam RN        Task: Provide education on frequency and refill details of medications    Responsible User: María Putnam RN        Goal: Problem Solving - know how to prevent and manage short-term diabetes complications         Task: Provide education on high blood glucose - causes, signs/symptoms, prevention and treatment Completed 3/11/2024   Responsible User: María Putnam RN        Task: Provide education on low blood glucose - causes, signs/symptoms, prevention, treatment, carrying a carbohydrate source at all times, and medical identification Completed 3/11/2024   Responsible User: María Putnam RN        Task: Provide education on safe travel with diabetes    Responsible User: María Putnam RN        Task: Provide education on how to care for diabetes on sick days    Responsible User: María Putnam RN        Task: Provide education on when to call a health care provider    Responsible User: María Putnam RN        Goal: Reducing Risks - know how to prevent and treat long-term diabetes complications         Task: Provide education on major complications of diabetes, prevention, early diagnostic measures and treatment of complications    Responsible User: María Putnam RN        Task: Provide education on recommended care for dental, eye and foot health    Responsible User: María Putnam RN        Task: Provide education on Hemoglobin A1c - goals and relationship to blood glucose levels Completed 3/11/2024   Responsible User: María Putnam RN        Task: Provide education on recommendations for heart health - lipid levels and goals, blood pressure and goals, and aspirin therapy, if indicated    Responsible User: María Putnam RN        Task: Provide education on tobacco cessation     Responsible User: María Putnam RN        Goal: Healthy Coping - use available resources to cope with the challenges of managing diabetes         Task: Discuss recognizing feelings about having diabetes Completed 3/11/2024   Responsible User: María Putnam RN        Task: Provide education on the benefits of making appropriate lifestyle changes Completed 3/11/2024   Responsible User: María Putnam RN        Task: Provide education on benefits of utilizing support systems    Responsible User: María Putnam RN        Task: Discuss methods for coping with stress    Responsible User: María Putnam RN        Task: Provide education on when to seek professional counseling    Responsible User: María Putnam RN            Time Spent: 30 minutes  Encounter Type: Individual    Any diabetes medication dose changes were made via the CDE Protocol per the patient's referring provider. A copy of this encounter was shared with the provider.

## 2024-03-11 NOTE — PROGRESS NOTES
"Diabetes Self-Management Education & Support    Presents for: Individual review    Type of Service: In Person Visit      ASSESSMENT:    Patient seen today for DM edu.   She started Mounjaro on 24, took her 4th injection yesterday. Pt reports she is tolerating fine and denies any side effects. She stopped tradjenta and is still taking Glipizide XL 10 mg BID.     She brings in BG meter, readings starting with most recent.     FB, 138, 124, 71, 117, 73, 75, 81, 130, 66, 117, 61, 64, 96, 119, 95, 83, 96, 117, 126, 102, 117, 114  11pm: 66  2pm: 153   4pm: 125, 126, 194, 118    Readings prior to 24 (prior to starting Mounjaro)    FB, 160, 140, 1445, 156, 140, 150,171, 177, 137, 170    2pm: 227, 165  4pm: 179, 207, 243    Discussed significant improvement with Mounjaro. Also discussed low BG. Pt reports she has felt completely fine. She states \"only once, about a week ago I felt just a little shaky.\" She states all other times her readings have been in the 60's and 70's she has felt fine. Reviewed signs and symptoms of hypoglycemia and treatment. Pt notes the time she did feel shaky, it was not difficult to treat.     Pt report she is eating oatmeal and fruit for breakfast, usually cottage cheese and fruit for lunch and dinner can vary but typically includes a protein and vegetable.     She is using a walking today, limited mobility.     Discussed concerns over low BG readings and possibly decreasing glipizide. Pt states she would \" Can't I just add a snack at bedtime, I can do that instead.\" Advised we could try that. However, if low BG continue or readings continue to show up below 70 she is to let CDE know. She states she will. Also discussed not going long periods w/o eating, not skipping lunch. Pt verbalized understanding.       Patient's most recent   Lab Results   Component Value Date    A1C 9.1 2024     is not meeting goal of <8.0    Diabetes knowledge and skills assessment: " "  Patient is knowledgeable in diabetes management concepts related to: Healthy Eating, Being Active, Monitoring, Taking Medication, Problem Solving, Reducing Risks, and Healthy Coping    Continue education with the following diabetes management concepts: all as needed     Based on learning assessment above, most appropriate setting for further diabetes education would be: Individual setting.      PLAN    Doing well!   Add bedtime snack.   Do not go long periods without eating or skip meals.   If readings continue <80 to let CDE know. Pt agrees and was given phone number.     Follow-up: 5/9/24 as scheduled, will have A1c done that day as well     See Care Plan for co-developed, patient-state behavior change goals.  AVS provided for patient today.    SUBJECTIVE/OBJECTIVE:  Presents for: Individual review  Accompanied by: Self  Diabetes education in the past 24mo: No  Diabetes type: Type 2  Date of diagnosis: around 20 years per patient  Disease course: Improving  Difficulty affording diabetes medication?: No  Cultural Influences/Ethnic Background:  Not  or     Diabetes Symptoms & Complications:  Disease course: Improving       Patient Problem List and Family Medical History reviewed for relevant medical history, current medical status, and diabetes risk factors.    Vitals:  There were no vitals taken for this visit.  Estimated body mass index is 32.77 kg/m  as calculated from the following:    Height as of 7/12/23: 1.676 m (5' 6\").    Weight as of 2/14/24: 92.1 kg (203 lb).   Last 3 BP:   BP Readings from Last 3 Encounters:   02/14/24 110/62   08/02/23 118/64   07/12/23 (!) 140/70       History   Smoking Status    Never   Smokeless Tobacco    Never       Labs:  Lab Results   Component Value Date    A1C 9.1 02/08/2024     Lab Results   Component Value Date     02/08/2024     12/31/2019     No results found for: \"LDL\"  No results found for: \"HDL\"]  GFR Estimate   Date Value Ref Range Status " "  02/08/2024 44 (L) >60 mL/min/1.73m2 Final   12/31/2019 49 (L) >60 mL/min/1.73m2 Final     GFR Estimate If Black   Date Value Ref Range Status   12/31/2019 59 (L) >60 mL/min/1.73m2 Final     Lab Results   Component Value Date    CR 1.23 02/08/2024     No results found for: \"MICROALBUMIN\"    Healthy Eating:  Healthy Eating Assessed Today: Yes  Meals include: Breakfast, Dinner  Beverages: Water    Being Active:  Being Active Assessed Today: Yes  Barrier to exercise: Physical limitation    Monitoring:  Monitoring Assessed Today: Yes  Did patient bring glucose meter to appointment? : Yes  Blood Glucose Meter: Accu-chek  Times checking blood sugar at home (number): 1  Times checking blood sugar at home (per): Day      Taking Medications:  Diabetes Medication(s)       Sulfonylureas       glipiZIDE (GLUCOTROL XL) 10 MG 24 hr tablet Take 1 tablet (10 mg) by mouth 2 times daily       Incretin Mimetic Agents       tirzepatide (MOUNJARO) 2.5 MG/0.5ML pen Inject 2.5 mg Subcutaneous every 7 days            Taking Medication Assessed Today: Yes  Current Treatments: Oral Medication (taken by mouth), Non-insulin Injectables  Problems taking diabetes medications regularly?: No  Diabetes medication side effects?: No         Healthy Coping:  Healthy Coping Assessed Today: Yes  Emotional response to diabetes: Ready to learn  Informal Support system:: Family, Friends  Stage of change: ACTION (Actively working towards change)  Patient Activation Measure Survey Score:       No data to display                  Care Plan and Education Provided:  Care Plan: Diabetes   Updates made by María Putnam RN since 3/11/2024 12:00 AM        Problem: HbA1C Not In Goal         Goal: Establish Regular Follow-Ups with PCP         Task: Discuss with PCP the recommended timing for patient's next follow up visit(s)    Responsible User: Mraía Putnam RN        Task: Discuss schedule for PCP visits with patient Completed 3/11/2024   Responsible User: " María Putnam RN        Goal: Get HbA1C Level in Goal         Task: Educate patient on diabetes education self-management topics Completed 3/11/2024   Responsible User: María Putnam RN        Task: Educate patient on benefits of regular glucose monitoring Completed 3/11/2024   Responsible User: María Putnam RN        Task: Refer patient to appropriate extended care team member, as needed (Medication Therapy Management, Behavioral Health, Physical Therapy, etc.)    Responsible User: María Putnam RN        Task: Discuss diabetes treatment plan with patient Completed 3/11/2024   Responsible User: María Putnam RN        Problem: Diabetes Self-Management Education Needed to Optimize Self-Care Behaviors         Goal: Understand diabetes pathophysiology and disease progression         Task: Provide education on diabetes pathophysiology and disease progression specfic to patient's diabetes type Completed 3/11/2024   Responsible User: María Putnam RN        Goal: Healthy Eating - follow a healthy eating pattern for diabetes    Note:    Add snack at bedtime        Task: Provide education on portion control and consistency in amount, composition and timing of food intake Completed 3/11/2024   Responsible User: María Putnam RN        Task: Provide education on managing carbohydrate intake (carbohydrate counting, plate planning method, etc.) Completed 3/11/2024   Responsible User: María Putnam RN        Task: Provide education on weight management    Responsible User: María Putnam RN        Task: Provide education on heart healthy eating    Responsible User: María Putnam RN        Task: Provide education on eating out    Responsible User: María Putnam RN        Task: Develop individualized healthy eating plan with patient    Responsible User: María Putnam RN        Goal: Being Active - get regular physical activity, working up to at least 150 minutes per week         Task: Provide education on  relationship of activity to glucose and precautions to take if at risk for low glucose    Responsible User: María Putnam RN        Task: Discuss barriers to physical activity with patient Completed 3/11/2024   Responsible User: María Putnam RN        Task: Develop physical activity plan with patient    Responsible User: María Putnam RN        Task: Explore community resources including walking groups, assistance programs, and home videos    Responsible User: aMría Putnam RN        Goal: Monitoring - monitor glucose and ketones as directed    Note:    Check BG 1-2x/day        Task: Provide education on blood glucose monitoring (purpose, proper technique, frequency, glucose targets, interpreting results, when to use glucose control solution, sharps disposal) Completed 3/11/2024   Responsible User: María Putnam RN        Task: Provide education on continuous glucose monitoring (sensor placement, use of krish or /reader, understanding glucose trends, alerts and alarms, differences between sensor glucose and blood glucose)    Responsible User: María Putnam RN        Task: Provide education on ketone monitoring (when to monitor, frequency, etc.)    Responsible User: María Putnam RN        Goal: Taking Medication - patient is consistently taking medications as directed    Note:    Take medications as prescribed        Task: Provide education on action of prescribed medication, including when to take and possible side effects Completed 3/11/2024   Responsible User: María Putnam RN        Task: Provide education on insulin and injectable diabetes medications, including administration, storage, site selection and rotation for injection sites Completed 3/11/2024   Responsible User: María Ptunam RN        Task: Discuss barriers to medication adherence with patient and provide management technique ideas as appropriate    Responsible User: María Putnam RN        Task: Provide education on  frequency and refill details of medications    Responsible User: María Putnam RN        Goal: Problem Solving - know how to prevent and manage short-term diabetes complications         Task: Provide education on high blood glucose - causes, signs/symptoms, prevention and treatment Completed 3/11/2024   Responsible User: María Putnam RN        Task: Provide education on low blood glucose - causes, signs/symptoms, prevention, treatment, carrying a carbohydrate source at all times, and medical identification Completed 3/11/2024   Responsible User: María Putnam RN        Task: Provide education on safe travel with diabetes    Responsible User: María Putnam RN        Task: Provide education on how to care for diabetes on sick days    Responsible User: María Putnam RN        Task: Provide education on when to call a health care provider    Responsible User: María Putnam RN        Goal: Reducing Risks - know how to prevent and treat long-term diabetes complications         Task: Provide education on major complications of diabetes, prevention, early diagnostic measures and treatment of complications    Responsible User: María Putnam RN        Task: Provide education on recommended care for dental, eye and foot health    Responsible User: María Putnam RN        Task: Provide education on Hemoglobin A1c - goals and relationship to blood glucose levels Completed 3/11/2024   Responsible User: María Putnam RN        Task: Provide education on recommendations for heart health - lipid levels and goals, blood pressure and goals, and aspirin therapy, if indicated    Responsible User: María Putnam RN        Task: Provide education on tobacco cessation    Responsible User: María Putnam RN        Goal: Healthy Coping - use available resources to cope with the challenges of managing diabetes         Task: Discuss recognizing feelings about having diabetes Completed 3/11/2024   Responsible User: [place]  DOMINIQUE Daniel        Task: Provide education on the benefits of making appropriate lifestyle changes Completed 3/11/2024   Responsible User: María Putnam RN        Task: Provide education on benefits of utilizing support systems    Responsible User: María Putnam RN        Task: Discuss methods for coping with stress    Responsible User: María Putnam RN        Task: Provide education on when to seek professional counseling    Responsible User: María Putnam RN            Time Spent: 30 minutes  Encounter Type: Individual    Any diabetes medication dose changes were made via the CDE Protocol per the patient's referring provider. A copy of this encounter was shared with the provider.

## 2024-03-11 NOTE — LETTER
"    3/11/2024         RE: Taylor Sanford  [address]  [facility] [address]        Dear Colleague,    Thank you for referring your patient, Taylor Sanford, to the [facility]. Please see a copy of my visit note below.    Diabetes Self-Management Education & Support    Presents for: Individual review    Type of Service: In Person Visit      ASSESSMENT:    Patient seen today for DM edu.   She started Mounjaro on 24, took her 4th injection yesterday. Pt reports she is tolerating fine and denies any side effects. She stopped tradjenta and is still taking Glipizide XL 10 mg BID.     She brings in BG meter, readings starting with most recent.     FB, 138, 124, 71, 117, 73, 75, 81, 130, 66, 117, 61, 64, 96, 119, 95, 83, 96, 117, 126, 102, 117, 114  11pm: 66  2pm: 153   4pm: 125, 126, 194, 118    Readings prior to 24 (prior to starting Mounjaro)    FB, 160, 140, 1445, 156, 140, 150,171, 177, 137, 170    2pm: 227, 165  4pm: 179, 207, 243    Discussed significant improvement with Mounjaro. Also discussed low BG. Pt reports she has felt completely fine. She states \"only once, about a week ago I felt just a little shaky.\" She states all other times her readings have been in the 60's and 70's she has felt fine. Reviewed signs and symptoms of hypoglycemia and treatment. Pt notes the time she did feel shaky, it was not difficult to treat.     Pt report she is eating oatmeal and fruit for breakfast, usually cottage cheese and fruit for lunch and dinner can vary but typically includes a protein and vegetable.     She is using a walking today, limited mobility.     Discussed concerns over low BG readings and possibly decreasing glipizide. Pt states she would \" Can't I just add a snack at bedtime, I can do that instead.\" Advised we could try that. However, if low BG continue or readings continue to show up below 70 she is to let CDE know. She states she will. Also " "discussed not going long periods w/o eating, not skipping lunch. Pt verbalized understanding.       Patient's most recent   Lab Results   Component Value Date    A1C 9.1 02/08/2024     is not meeting goal of <8.0    Diabetes knowledge and skills assessment:   Patient is knowledgeable in diabetes management concepts related to: Healthy Eating, Being Active, Monitoring, Taking Medication, Problem Solving, Reducing Risks, and Healthy Coping    Continue education with the following diabetes management concepts: all as needed     Based on learning assessment above, most appropriate setting for further diabetes education would be: Individual setting.      PLAN    Doing well!   Add bedtime snack.   Do not go long periods without eating or skip meals.   If readings continue <80 to let CDE know. Pt agrees and was given phone number.     Follow-up: 5/9/24 as scheduled, will have A1c done that day as well     See Care Plan for co-developed, patient-state behavior change goals.  AVS provided for patient today.    SUBJECTIVE/OBJECTIVE:  Presents for: Individual review  Accompanied by: Self  Diabetes education in the past 24mo: No  Diabetes type: Type 2  Date of diagnosis: around 20 years per patient  Disease course: Improving  Difficulty affording diabetes medication?: No  Cultural Influences/Ethnic Background:  Not  or     Diabetes Symptoms & Complications:  Disease course: Improving       Patient Problem List and Family Medical History reviewed for relevant medical history, current medical status, and diabetes risk factors.    Vitals:  There were no vitals taken for this visit.  Estimated body mass index is 32.77 kg/m  as calculated from the following:    Height as of 7/12/23: 1.676 m (5' 6\").    Weight as of 2/14/24: 92.1 kg (203 lb).   Last 3 BP:   BP Readings from Last 3 Encounters:   02/14/24 110/62   08/02/23 118/64   07/12/23 (!) 140/70       History   Smoking Status    Never   Smokeless Tobacco    Never " "      Labs:  Lab Results   Component Value Date    A1C 9.1 02/08/2024     Lab Results   Component Value Date     02/08/2024     12/31/2019     No results found for: \"LDL\"  No results found for: \"HDL\"]  GFR Estimate   Date Value Ref Range Status   02/08/2024 44 (L) >60 mL/min/1.73m2 Final   12/31/2019 49 (L) >60 mL/min/1.73m2 Final     GFR Estimate If Black   Date Value Ref Range Status   12/31/2019 59 (L) >60 mL/min/1.73m2 Final     Lab Results   Component Value Date    CR 1.23 02/08/2024     No results found for: \"MICROALBUMIN\"    Healthy Eating:  Healthy Eating Assessed Today: Yes  Meals include: Breakfast, Dinner  Beverages: Water    Being Active:  Being Active Assessed Today: Yes  Barrier to exercise: Physical limitation    Monitoring:  Monitoring Assessed Today: Yes  Did patient bring glucose meter to appointment? : Yes  Blood Glucose Meter: Accu-chek  Times checking blood sugar at home (number): 1  Times checking blood sugar at home (per): Day      Taking Medications:  Diabetes Medication(s)       Sulfonylureas       glipiZIDE (GLUCOTROL XL) 10 MG 24 hr tablet Take 1 tablet (10 mg) by mouth 2 times daily       Incretin Mimetic Agents       tirzepatide (MOUNJARO) 2.5 MG/0.5ML pen Inject 2.5 mg Subcutaneous every 7 days            Taking Medication Assessed Today: Yes  Current Treatments: Oral Medication (taken by mouth), Non-insulin Injectables  Problems taking diabetes medications regularly?: No  Diabetes medication side effects?: No         Healthy Coping:  Healthy Coping Assessed Today: Yes  Emotional response to diabetes: Ready to learn  Informal Support system:: Family, Friends  Stage of change: ACTION (Actively working towards change)  Patient Activation Measure Survey Score:       No data to display                  Care Plan and Education Provided:  Care Plan: Diabetes   Updates made by María Putnam, RN since 3/11/2024 12:00 AM        Problem: HbA1C Not In Goal         Goal: Establish " Regular Follow-Ups with PCP         Task: Discuss with PCP the recommended timing for patient's next follow up visit(s)    Responsible User: María Putnam RN        Task: Discuss schedule for PCP visits with patient Completed 3/11/2024   Responsible User: María Putnam RN        Goal: Get HbA1C Level in Goal         Task: Educate patient on diabetes education self-management topics Completed 3/11/2024   Responsible User: María Putnam RN        Task: Educate patient on benefits of regular glucose monitoring Completed 3/11/2024   Responsible User: María Putnam RN        Task: Refer patient to appropriate extended care team member, as needed (Medication Therapy Management, Behavioral Health, Physical Therapy, etc.)    Responsible User: María Putnam RN        Task: Discuss diabetes treatment plan with patient Completed 3/11/2024   Responsible User: María Putnam RN        Problem: Diabetes Self-Management Education Needed to Optimize Self-Care Behaviors         Goal: Understand diabetes pathophysiology and disease progression         Task: Provide education on diabetes pathophysiology and disease progression specfic to patient's diabetes type Completed 3/11/2024   Responsible User: María Putnam RN        Goal: Healthy Eating - follow a healthy eating pattern for diabetes    Note:    Add snack at bedtime        Task: Provide education on portion control and consistency in amount, composition and timing of food intake Completed 3/11/2024   Responsible User: María Putnam RN        Task: Provide education on managing carbohydrate intake (carbohydrate counting, plate planning method, etc.) Completed 3/11/2024   Responsible User: María Putnam RN        Task: Provide education on weight management    Responsible User: María Putnam RN        Task: Provide education on heart healthy eating    Responsible User: María Putnam RN        Task: Provide education on eating out    Responsible User: [place]  DOMINIQUE Daniel        Task: Develop individualized healthy eating plan with patient    Responsible User: María Putnam RN        Goal: Being Active - get regular physical activity, working up to at least 150 minutes per week         Task: Provide education on relationship of activity to glucose and precautions to take if at risk for low glucose    Responsible User: María Putnam RN        Task: Discuss barriers to physical activity with patient Completed 3/11/2024   Responsible User: María Putnam RN        Task: Develop physical activity plan with patient    Responsible User: María Putnam RN        Task: Explore community resources including walking groups, assistance programs, and home videos    Responsible User: María Putnam RN        Goal: Monitoring - monitor glucose and ketones as directed    Note:    Check BG 1-2x/day        Task: Provide education on blood glucose monitoring (purpose, proper technique, frequency, glucose targets, interpreting results, when to use glucose control solution, sharps disposal) Completed 3/11/2024   Responsible User: María Putnam RN        Task: Provide education on continuous glucose monitoring (sensor placement, use of krish or /reader, understanding glucose trends, alerts and alarms, differences between sensor glucose and blood glucose)    Responsible User: María Putnam RN        Task: Provide education on ketone monitoring (when to monitor, frequency, etc.)    Responsible User: María Putnam RN        Goal: Taking Medication - patient is consistently taking medications as directed    Note:    Take medications as prescribed        Task: Provide education on action of prescribed medication, including when to take and possible side effects Completed 3/11/2024   Responsible User: María Putnam RN        Task: Provide education on insulin and injectable diabetes medications, including administration, storage, site selection and rotation for injection sites  Completed 3/11/2024   Responsible User: María Putnam RN        Task: Discuss barriers to medication adherence with patient and provide management technique ideas as appropriate    Responsible User: María Putnam RN        Task: Provide education on frequency and refill details of medications    Responsible User: María Putnam RN        Goal: Problem Solving - know how to prevent and manage short-term diabetes complications         Task: Provide education on high blood glucose - causes, signs/symptoms, prevention and treatment Completed 3/11/2024   Responsible User: María Putnam RN        Task: Provide education on low blood glucose - causes, signs/symptoms, prevention, treatment, carrying a carbohydrate source at all times, and medical identification Completed 3/11/2024   Responsible User: María Putnam RN        Task: Provide education on safe travel with diabetes    Responsible User: María Putnam RN        Task: Provide education on how to care for diabetes on sick days    Responsible User: María Putnam RN        Task: Provide education on when to call a health care provider    Responsible User: María Putnam RN        Goal: Reducing Risks - know how to prevent and treat long-term diabetes complications         Task: Provide education on major complications of diabetes, prevention, early diagnostic measures and treatment of complications    Responsible User: María Putnam RN        Task: Provide education on recommended care for dental, eye and foot health    Responsible User: María Putnam RN        Task: Provide education on Hemoglobin A1c - goals and relationship to blood glucose levels Completed 3/11/2024   Responsible User: María Putnam RN        Task: Provide education on recommendations for heart health - lipid levels and goals, blood pressure and goals, and aspirin therapy, if indicated    Responsible User: María Putnam RN        Task: Provide education on tobacco cessation     Responsible User: María Putnam RN        Goal: Healthy Coping - use available resources to cope with the challenges of managing diabetes         Task: Discuss recognizing feelings about having diabetes Completed 3/11/2024   Responsible User: María Putnam RN        Task: Provide education on the benefits of making appropriate lifestyle changes Completed 3/11/2024   Responsible User: María Putnam RN        Task: Provide education on benefits of utilizing support systems    Responsible User: María Putnam RN        Task: Discuss methods for coping with stress    Responsible User: María Putnam RN        Task: Provide education on when to seek professional counseling    Responsible User: María Putnam RN            Time Spent: 30 minutes  Encounter Type: Individual    Any diabetes medication dose changes were made via the CDE Protocol per the patient's referring provider. A copy of this encounter was shared with the provider.

## 2024-03-13 ENCOUNTER — TELEPHONE (OUTPATIENT)
Dept: EDUCATION SERVICES | Facility: CLINIC | Age: 84
End: 2024-03-13
Payer: COMMERCIAL

## 2024-03-13 NOTE — TELEPHONE ENCOUNTER
Called pt at both numbers listed. No answer at either number. LM on both lines for pt to call back.

## 2024-03-13 NOTE — TELEPHONE ENCOUNTER
[facility] Call Center    Phone Message    May a detailed message be left on voicemail: yes     Reason for Call: Other: Per pt wanted to report that after taking the medication tirzepatide (MOUNJARO) 2.5 MG/0.5ML pen and having a injection shot for her hearing she started with diarrhea yesterday. Per pt also suppose to get a shot injection for her knees today and isnt sure if she should get the shot injection? Per pt will stop taking the medication tirzepatide (MOUNJARO) 2.5 MG/0.5ML pen to see if that's the cause for the diarrhea. Please call pt back to let her know what she should do. Thank you!      Action Taken: Message routed to:  [facility] ENDO    Travel Screening: Not Applicable

## 2024-03-14 NOTE — TELEPHONE ENCOUNTER
"Spoke with pt. She states with the most recent Mounjaro shot she had \"bad diarrhea all day.\"     Previously, did not have diarrhea with injection. Advised pt it is worth trying again, if similar situation happens to let CDE know and will go from there. Pt verbalized understanding.   "

## 2024-03-26 ENCOUNTER — TELEPHONE (OUTPATIENT)
Dept: ENDOCRINOLOGY | Facility: CLINIC | Age: 84
End: 2024-03-26
Payer: COMMERCIAL

## 2024-03-26 DIAGNOSIS — E11.65 TYPE 2 DIABETES MELLITUS WITH HYPERGLYCEMIA, WITHOUT LONG-TERM CURRENT USE OF INSULIN (H): Primary | ICD-10-CM

## 2024-03-26 RX ORDER — LINAGLIPTIN 5 MG/1
5 TABLET, FILM COATED ORAL DAILY
Qty: 90 TABLET | Refills: 1 | Status: SHIPPED | OUTPATIENT
Start: 2024-03-26

## 2024-03-26 NOTE — TELEPHONE ENCOUNTER
I called the pt, she spoke to the CDE on 3/13/24. The pt states that she experienced diarrhea again after injecting Mounjaro. I informed I will pass this information along and have the CDE follow up.

## 2024-03-26 NOTE — TELEPHONE ENCOUNTER
[facility] Call Center    Phone Message    May a detailed message be left on voicemail: yes     Reason for Call: Medication Question or concern regarding medication   Prescription Clarification  Name of Medication: Mounjaro  Prescribing Provider: Adrián      Patient has some questions about Mounjaro and would like a call back from Dr Alberts team.    Action Taken: Message routed to:  [facility] endo    Travel Screening: Not Applicable

## 2024-03-26 NOTE — TELEPHONE ENCOUNTER
Given side effects with Mounjaro, I would recommend remaining off it.  Please keep well-hydrated since dehydration could affect kidney function.  We can restart Tradjenta 5 mg daily: I have renewed prescription with pharmacy.  If blood glucose values remain abnormal on follow-up, we may need to consider addition of insulin.

## 2024-03-26 NOTE — TELEPHONE ENCOUNTER
Spoke with patient. She states she took Mounjaro on Jasen 3/17 and had diarrhea all week M-F. Therefore she did not take it again on Jasen 3/24.     Advised pt will discuss with Dr. Sampson.

## 2024-04-18 ENCOUNTER — LAB (OUTPATIENT)
Dept: LAB | Facility: CLINIC | Age: 84
End: 2024-04-18
Payer: COMMERCIAL

## 2024-04-18 DIAGNOSIS — L02.612: Primary | ICD-10-CM

## 2024-04-18 PROCEDURE — 87205 SMEAR GRAM STAIN: CPT

## 2024-04-20 LAB
BACTERIA WND CULT: ABNORMAL
GRAM STAIN RESULT: ABNORMAL
GRAM STAIN RESULT: ABNORMAL

## 2024-05-09 ENCOUNTER — ALLIED HEALTH/NURSE VISIT (OUTPATIENT)
Dept: EDUCATION SERVICES | Facility: CLINIC | Age: 84
End: 2024-05-09
Payer: COMMERCIAL

## 2024-05-09 ENCOUNTER — LAB (OUTPATIENT)
Dept: LAB | Facility: CLINIC | Age: 84
End: 2024-05-09
Payer: COMMERCIAL

## 2024-05-09 DIAGNOSIS — E11.65 TYPE 2 DIABETES MELLITUS WITH HYPERGLYCEMIA, WITHOUT LONG-TERM CURRENT USE OF INSULIN (H): ICD-10-CM

## 2024-05-09 DIAGNOSIS — Z51.81 MONITORING FOR ANTICOAGULANT USE: ICD-10-CM

## 2024-05-09 DIAGNOSIS — E11.9 DIABETES MELLITUS (H): Primary | ICD-10-CM

## 2024-05-09 DIAGNOSIS — Z79.01 LONG TERM (CURRENT) USE OF ANTICOAGULANTS: ICD-10-CM

## 2024-05-09 DIAGNOSIS — Z79.01 MONITORING FOR ANTICOAGULANT USE: ICD-10-CM

## 2024-05-09 LAB
HBA1C MFR BLD: 8.4 % (ref 0–5.6)
INR BLD: 2.6 (ref 0.9–1.1)

## 2024-05-09 PROCEDURE — 83036 HEMOGLOBIN GLYCOSYLATED A1C: CPT

## 2024-05-09 PROCEDURE — G0108 DIAB MANAGE TRN  PER INDIV: HCPCS

## 2024-05-09 PROCEDURE — 85610 PROTHROMBIN TIME: CPT

## 2024-05-09 PROCEDURE — 36415 COLL VENOUS BLD VENIPUNCTURE: CPT

## 2024-05-09 NOTE — LETTER
5/9/2024         RE: Taylor Sanford  [address]  [facility] [address]        Dear Colleague,    Thank you for referring your patient, Taylor Sanford, to the [facility]. Please see a copy of my visit note below.    Diabetes Self-Management Education & Support    Presents for: Follow-up    Type of Service: In Person Visit      ASSESSMENT:      Patient seen today for follow up. She was  unable to tolerate Mounjaro, this was discontinued end of March. Pt was restarted on Tradjenta 5mg and is still taking Glipizide XL 10 mg BID.      She brings in BG meter, only checking FBG, readings since our last visit, starting with most recent (today).     193, 216, 152, 115, 134, 140, 125, 169, 139, 170, 143, 152, 144, 135, 206, 178, 250, 168, 166, 196, 156, 142, 138, 146, 175, 164, 155, 141, 196, 147, 192, 205, 155, 140, 119, 172, 98, 128, 176, 1221, 117, 146, 115, 158, 133, 136, 135, 126, 131, 112, 94, 119, 82, 117, 141, 165, 127, 120, 67, 71.      Patient denies any low BG. Reviewed signs and symptoms of hypoglycemia and treatment. Pt notes higher FBG correlate with eating a couple cookies at HS.      No significant changes to diet since our last visit. Reports she is eating oatmeal and fruit for breakfast, usually cottage cheese and fruit for lunch and dinner can vary but typically includes a protein and vegetable. Sometimes may have a bigger lunch and then a smaller dinner.      She is using a walker today, limited mobility. She had a fall since our last visit and fractured her ankle, she is wearing a boot today. Pt reports they did not believe she will need surgery.   Her sister also passed away recently.     A1c improved today at 8.4%, however still not at goal. Pt is aware Dr. Sampson was considering insulin and she is hoping she doesn't have to do that. Her FBG are in range about 50% of the time.   Discussed trying to limit sweets (cookies).     Patient's most recent   Lab  "Results   Component Value Date    A1C 8.4 05/09/2024    A1C 9.1 02/08/2024    A1C 7.7 11/01/2023    A1C 8.1 07/26/2023    A1C 6.5 04/20/2023       is not meeting goal of <8.0    Diabetes knowledge and skills assessment:   Patient is knowledgeable in diabetes management concepts related to: Healthy Eating, Being Active, Monitoring, Taking Medication, Problem Solving, and Healthy Coping    Continue education with the following diabetes management concepts: Healthy Eating, Being Active, Monitoring, Taking Medication, Problem Solving, Reducing Risks, and Healthy Coping    Based on learning assessment above, most appropriate setting for further diabetes education would be: Individual setting.      PLAN    Will try to limit cookies a bit.   Continue with glipizide and tradjenta.   Continue to monitor BG daily.      Follow-up: August as scheduled with Dr. Sampson Sept with CDE. To call sooner w/ concerns.     See Care Plan for co-developed, patient-state behavior change goals.  AVS provided for patient today.      SUBJECTIVE/OBJECTIVE:  Presents for: Follow-up  Accompanied by: Self  Diabetes education in the past 24mo: Yes  Diabetes type: Type 2  Date of diagnosis: around 20 years per patient  Difficulty affording diabetes medication?: No  Cultural Influences/Ethnic Background:  Not  or     Diabetes Symptoms & Complications:          Patient Problem List and Family Medical History reviewed for relevant medical history, current medical status, and diabetes risk factors.    Vitals:  There were no vitals taken for this visit.  Estimated body mass index is 32.77 kg/m  as calculated from the following:    Height as of 7/12/23: 1.676 m (5' 6\").    Weight as of 2/14/24: 92.1 kg (203 lb).   Last 3 BP:   BP Readings from Last 3 Encounters:   02/14/24 110/62   08/02/23 118/64   07/12/23 (!) 140/70       History   Smoking Status    Never   Smokeless Tobacco    Never       Labs:  Lab Results   Component Value Date    " "A1C 9.1 02/08/2024     Lab Results   Component Value Date     02/08/2024     12/31/2019     No results found for: \"LDL\"  No results found for: \"HDL\"]  GFR Estimate   Date Value Ref Range Status   02/08/2024 44 (L) >60 mL/min/1.73m2 Final   12/31/2019 49 (L) >60 mL/min/1.73m2 Final     GFR Estimate If Black   Date Value Ref Range Status   12/31/2019 59 (L) >60 mL/min/1.73m2 Final     Lab Results   Component Value Date    CR 1.23 02/08/2024     No results found for: \"MICROALBUMIN\"    Healthy Eating:  Healthy Eating Assessed Today: Yes  Meals include: Breakfast, Dinner  Beverages: Water    Being Active:  Being Active Assessed Today: Yes  Barrier to exercise: Physical limitation    Monitoring:  Monitoring Assessed Today: Yes  Did patient bring glucose meter to appointment? : Yes  Blood Glucose Meter: Accu-chek  Times checking blood sugar at home (number): 1  Times checking blood sugar at home (per): Day    Taking Medications:  Diabetes Medication(s)       Dipeptidyl Peptidase-4 (DPP-4) Inhibitors       linagliptin (TRADJENTA) 5 MG TABS tablet Take 1 tablet (5 mg) by mouth daily       Sulfonylureas       glipiZIDE (GLUCOTROL XL) 10 MG 24 hr tablet Take 1 tablet (10 mg) by mouth 2 times daily            Taking Medication Assessed Today: Yes  Current Treatments: Oral Medication (taken by mouth)  Problems taking diabetes medications regularly?: No  Diabetes medication side effects?: No    Problem Solving:  Problem Solving Assessed Today: Yes              Reducing Risks:       Healthy Coping:  Healthy Coping Assessed Today: Yes  Emotional response to diabetes: Ready to learn  Informal Support system:: Family, Friends  Stage of change: ACTION (Actively working towards change)  Patient Activation Measure Survey Score:       No data to display                  Care Plan and Education Provided:  Healthy Eating: Balanced meals, Carbohydrate Counting, Consistency in amount and timing of carbohydrate intake, Plate " planning method, and Portion control, Being Active: Finding a physical activity routine that works for you, Precautions to take with exercise, and Relationship of activity to glucose, Monitoring: Frequency of monitoring and Individual glucose targets, Taking Medication: Side effects of prescribed medication(s) and When to take medication(s), Problem Solving: High glucose - causes, signs/symptoms, treatment and prevention, Low glucose - causes, signs/symptoms, treatment and prevention, Rule of 15 and carrying a carbohydrate source at all times in case of low glucose, and When to call a health care provider, Reducing Risks: A1C - goals, relating to blood glucose levels, how often to check, and Healthy Coping: Benefits of making appropriate lifestyle changes        Time Spent: 30 minutes  Encounter Type: Individual    Any diabetes medication dose changes were made via the C[facility] Protocol per the patient's referring provider. A copy of this encounter was shared with the provider.

## 2024-05-09 NOTE — LETTER
5/9/2024         RE: Taylor Sanford  [address]  [facility] [address]        Dear Colleague,    Thank you for referring your patient, Taylor Sanford, to the [facility]. Please see a copy of my visit note below.    Diabetes Self-Management Education & Support    Presents for: Follow-up    Type of Service: In Person Visit      ASSESSMENT:      Patient seen today for follow up. She was  unable to tolerate Mounjaro, this was discontinued end of March. Pt was restarted on Tradjenta 5mg and is still taking Glipizide XL 10 mg BID.      She brings in BG meter, only checking FBG, readings since our last visit, starting with most recent (today).     193, 216, 152, 115, 134, 140, 125, 169, 139, 170, 143, 152, 144, 135, 206, 178, 250, 168, 166, 196, 156, 142, 138, 146, 175, 164, 155, 141, 196, 147, 192, 205, 155, 140, 119, 172, 98, 128, 176, 1221, 117, 146, 115, 158, 133, 136, 135, 126, 131, 112, 94, 119, 82, 117, 141, 165, 127, 120, 67, 71.      Patient denies any low BG. Reviewed signs and symptoms of hypoglycemia and treatment. Pt notes higher FBG correlate with eating a couple cookies at HS.      No significant changes to diet since our last visit. Reports she is eating oatmeal and fruit for breakfast, usually cottage cheese and fruit for lunch and dinner can vary but typically includes a protein and vegetable. Sometimes may have a bigger lunch and then a smaller dinner.      She is using a walker today, limited mobility. She had a fall since our last visit and fractured her ankle, she is wearing a boot today. Pt reports they did not believe she will need surgery.   Her sister also passed away recently.     A1c improved today at 8.4%, however still not at goal. Pt is aware Dr. Sampson was considering insulin and she is hoping she doesn't have to do that. Her FBG are in range about 50% of the time.   Discussed trying to limit sweets (cookies).     Patient's most recent   Lab  "Results   Component Value Date    A1C 8.4 05/09/2024    A1C 9.1 02/08/2024    A1C 7.7 11/01/2023    A1C 8.1 07/26/2023    A1C 6.5 04/20/2023       is not meeting goal of <8.0    Diabetes knowledge and skills assessment:   Patient is knowledgeable in diabetes management concepts related to: Healthy Eating, Being Active, Monitoring, Taking Medication, Problem Solving, and Healthy Coping    Continue education with the following diabetes management concepts: Healthy Eating, Being Active, Monitoring, Taking Medication, Problem Solving, Reducing Risks, and Healthy Coping    Based on learning assessment above, most appropriate setting for further diabetes education would be: Individual setting.      PLAN    Will try to limit cookies a bit.   Continue with glipizide and tradjenta.   Continue to monitor BG daily.      Follow-up: August as scheduled with Dr. Sampson Sept with [facility]DE. To call sooner w/ concerns.     See Care Plan for co-developed, patient-state behavior change goals.  AVS provided for patient today.      SUBJECTIVE/OBJECTIVE:  Presents for: Follow-up  Accompanied by: Self  Diabetes education in the past 24mo: Yes  Diabetes type: Type 2  Date of diagnosis: around 20 years per patient  Difficulty affording diabetes medication?: No  Cultural Influences/Ethnic Background:  Not  or     Diabetes Symptoms & Complications:          Patient Problem List and Family Medical History reviewed for relevant medical history, current medical status, and diabetes risk factors.    Vitals:  There were no vitals taken for this visit.  Estimated body mass index is 32.77 kg/m  as calculated from the following:    Height as of 7/12/23: 1.676 m (5' 6\").    Weight as of 2/14/24: 92.1 kg (203 lb).   Last 3 BP:   BP Readings from Last 3 Encounters:   02/14/24 110/62   08/02/23 118/64   07/12/23 (!) 140/70       History   Smoking Status     Never   Smokeless Tobacco     Never       Labs:  Lab Results   Component Value Date    " "A1C 9.1 02/08/2024     Lab Results   Component Value Date     02/08/2024     12/31/2019     No results found for: \"LDL\"  No results found for: \"HDL\"]  GFR Estimate   Date Value Ref Range Status   02/08/2024 44 (L) >60 mL/min/1.73m2 Final   12/31/2019 49 (L) >60 mL/min/1.73m2 Final     GFR Estimate If Black   Date Value Ref Range Status   12/31/2019 59 (L) >60 mL/min/1.73m2 Final     Lab Results   Component Value Date    CR 1.23 02/08/2024     No results found for: \"MICROALBUMIN\"    Healthy Eating:  Healthy Eating Assessed Today: Yes  Meals include: Breakfast, Dinner  Beverages: Water    Being Active:  Being Active Assessed Today: Yes  Barrier to exercise: Physical limitation    Monitoring:  Monitoring Assessed Today: Yes  Did patient bring glucose meter to appointment? : Yes  Blood Glucose Meter: Accu-chek  Times checking blood sugar at home (number): 1  Times checking blood sugar at home (per): Day    Taking Medications:  Diabetes Medication(s)       Dipeptidyl Peptidase-4 (DPP-4) Inhibitors       linagliptin (TRADJENTA) 5 MG TABS tablet Take 1 tablet (5 mg) by mouth daily       Sulfonylureas       glipiZIDE (GLUCOTROL XL) 10 MG 24 hr tablet Take 1 tablet (10 mg) by mouth 2 times daily            Taking Medication Assessed Today: Yes  Current Treatments: Oral Medication (taken by mouth)  Problems taking diabetes medications regularly?: No  Diabetes medication side effects?: No    Problem Solving:  Problem Solving Assessed Today: Yes              Reducing Risks:       Healthy Coping:  Healthy Coping Assessed Today: Yes  Emotional response to diabetes: Ready to learn  Informal Support system:: Family, Friends  Stage of change: ACTION (Actively working towards change)  Patient Activation Measure Survey Score:       No data to display                  Care Plan and Education Provided:  Healthy Eating: Balanced meals, Carbohydrate Counting, Consistency in amount and timing of carbohydrate intake, Plate " planning method, and Portion control, Being Active: Finding a physical activity routine that works for you, Precautions to take with exercise, and Relationship of activity to glucose, Monitoring: Frequency of monitoring and Individual glucose targets, Taking Medication: Side effects of prescribed medication(s) and When to take medication(s), Problem Solving: High glucose - causes, signs/symptoms, treatment and prevention, Low glucose - causes, signs/symptoms, treatment and prevention, Rule of 15 and carrying a carbohydrate source at all times in case of low glucose, and When to call a health care provider, Reducing Risks: A1C - goals, relating to blood glucose levels, how often to check, and Healthy Coping: Benefits of making appropriate lifestyle changes        Time Spent: 30 minutes  Encounter Type: Individual    Any diabetes medication dose changes were made via the [facility]DE Protocol per the patient's referring provider. A copy of this encounter was shared with the provider.

## 2024-05-09 NOTE — PROGRESS NOTES
Diabetes Self-Management Education & Support    Presents for: Follow-up    Type of Service: In Person Visit      ASSESSMENT:      Patient seen today for follow up. She was  unable to tolerate Mounjaro, this was discontinued end of March. Pt was restarted on Tradjenta 5mg and is still taking Glipizide XL 10 mg BID.      She brings in BG meter, only checking FBG, readings since our last visit, starting with most recent (today).     193, 216, 152, 115, 134, 140, 125, 169, 139, 170, 143, 152, 144, 135, 206, 178, 250, 168, 166, 196, 156, 142, 138, 146, 175, 164, 155, 141, 196, 147, 192, 205, 155, 140, 119, 172, 98, 128, 176, 1221, 117, 146, 115, 158, 133, 136, 135, 126, 131, 112, 94, 119, 82, 117, 141, 165, 127, 120, 67, 71.      Patient denies any low BG. Reviewed signs and symptoms of hypoglycemia and treatment. Pt notes higher FBG correlate with eating a couple cookies at HS.      No significant changes to diet since our last visit. Reports she is eating oatmeal and fruit for breakfast, usually cottage cheese and fruit for lunch and dinner can vary but typically includes a protein and vegetable. Sometimes may have a bigger lunch and then a smaller dinner.      She is using a walker today, limited mobility. She had a fall since our last visit and fractured her ankle, she is wearing a boot today. Pt reports they did not believe she will need surgery.   Her sister also passed away recently.     A1c improved today at 8.4%, however still not at goal. Pt is aware Dr. Sampson was considering insulin and she is hoping she doesn't have to do that. Her FBG are in range about 50% of the time.   Discussed trying to limit sweets (cookies).     Patient's most recent   Lab Results   Component Value Date    A1C 8.4 05/09/2024    A1C 9.1 02/08/2024    A1C 7.7 11/01/2023    A1C 8.1 07/26/2023    A1C 6.5 04/20/2023       is not meeting goal of <8.0    Diabetes knowledge and skills assessment:   Patient is knowledgeable in diabetes  "management concepts related to: Healthy Eating, Being Active, Monitoring, Taking Medication, Problem Solving, and Healthy Coping    Continue education with the following diabetes management concepts: Healthy Eating, Being Active, Monitoring, Taking Medication, Problem Solving, Reducing Risks, and Healthy Coping    Based on learning assessment above, most appropriate setting for further diabetes education would be: Individual setting.      PLAN    Will try to limit cookies a bit.   Continue with glipizide and tradjenta.   Continue to monitor BG daily.      Follow-up: August as scheduled with Dr. Sampson, Sept with [facility]DE. To call sooner w/ concerns.     See Care Plan for co-developed, patient-state behavior change goals.  AVS provided for patient today.      SUBJECTIVE/OBJECTIVE:  Presents for: Follow-up  Accompanied by: Self  Diabetes education in the past 24mo: Yes  Diabetes type: Type 2  Date of diagnosis: around 20 years per patient  Difficulty affording diabetes medication?: No  Cultural Influences/Ethnic Background:  Not  or     Diabetes Symptoms & Complications:          Patient Problem List and Family Medical History reviewed for relevant medical history, current medical status, and diabetes risk factors.    Vitals:  There were no vitals taken for this visit.  Estimated body mass index is 32.77 kg/m  as calculated from the following:    Height as of 7/12/23: 1.676 m (5' 6\").    Weight as of 2/14/24: 92.1 kg (203 lb).   Last 3 BP:   BP Readings from Last 3 Encounters:   02/14/24 110/62   08/02/23 118/64   07/12/23 (!) 140/70       History   Smoking Status    Never   Smokeless Tobacco    Never       Labs:  Lab Results   Component Value Date    A1C 9.1 02/08/2024     Lab Results   Component Value Date     02/08/2024     12/31/2019     No results found for: \"LDL\"  No results found for: \"HDL\"]  GFR Estimate   Date Value Ref Range Status   02/08/2024 44 (L) >60 mL/min/1.73m2 Final " "  12/31/2019 49 (L) >60 mL/min/1.73m2 Final     GFR Estimate If Black   Date Value Ref Range Status   12/31/2019 59 (L) >60 mL/min/1.73m2 Final     Lab Results   Component Value Date    CR 1.23 02/08/2024     No results found for: \"MICROALBUMIN\"    Healthy Eating:  Healthy Eating Assessed Today: Yes  Meals include: Breakfast, Dinner  Beverages: Water    Being Active:  Being Active Assessed Today: Yes  Barrier to exercise: Physical limitation    Monitoring:  Monitoring Assessed Today: Yes  Did patient bring glucose meter to appointment? : Yes  Blood Glucose Meter: Accu-chek  Times checking blood sugar at home (number): 1  Times checking blood sugar at home (per): Day    Taking Medications:  Diabetes Medication(s)       Dipeptidyl Peptidase-4 (DPP-4) Inhibitors       linagliptin (TRADJENTA) 5 MG TABS tablet Take 1 tablet (5 mg) by mouth daily       Sulfonylureas       glipiZIDE (GLUCOTROL XL) 10 MG 24 hr tablet Take 1 tablet (10 mg) by mouth 2 times daily            Taking Medication Assessed Today: Yes  Current Treatments: Oral Medication (taken by mouth)  Problems taking diabetes medications regularly?: No  Diabetes medication side effects?: No    Problem Solving:  Problem Solving Assessed Today: Yes              Reducing Risks:       Healthy Coping:  Healthy Coping Assessed Today: Yes  Emotional response to diabetes: Ready to learn  Informal Support system:: Family, Friends  Stage of change: ACTION (Actively working towards change)  Patient Activation Measure Survey Score:       No data to display                  Care Plan and Education Provided:  Healthy Eating: Balanced meals, Carbohydrate Counting, Consistency in amount and timing of carbohydrate intake, Plate planning method, and Portion control, Being Active: Finding a physical activity routine that works for you, Precautions to take with exercise, and Relationship of activity to glucose, Monitoring: Frequency of monitoring and Individual glucose targets, " Taking Medication: Side effects of prescribed medication(s) and When to take medication(s), Problem Solving: High glucose - causes, signs/symptoms, treatment and prevention, Low glucose - causes, signs/symptoms, treatment and prevention, Rule of 15 and carrying a carbohydrate source at all times in case of low glucose, and When to call a health care provider, Reducing Risks: A1C - goals, relating to blood glucose levels, how often to check, and Healthy Coping: Benefits of making appropriate lifestyle changes        Time Spent: 30 minutes  Encounter Type: Individual    Any diabetes medication dose changes were made via the CDE Protocol per the patient's referring provider. A copy of this encounter was shared with the provider.

## 2024-05-09 NOTE — Clinical Note
Royce Sampson!   Her A1c is improved but still not at goal. I worried about her getting too low if she starts basal insulin. Do you feel she is ok to just continue and recheck in 3 months or do you have other thoughts?   Thanks! María

## 2024-05-10 ENCOUNTER — TELEPHONE (OUTPATIENT)
Dept: ENDOCRINOLOGY | Facility: CLINIC | Age: 84
End: 2024-05-10
Payer: COMMERCIAL

## 2024-05-10 DIAGNOSIS — E11.65 TYPE 2 DIABETES MELLITUS WITH HYPERGLYCEMIA, WITHOUT LONG-TERM CURRENT USE OF INSULIN (H): Primary | ICD-10-CM

## 2024-05-10 NOTE — TELEPHONE ENCOUNTER
Endocrine team, please update patient with the following message:  Hemoglobin A1c is trending down.  María Putnam updated me on education visit.  I agree on holding off on insulin since A1c is improving to minimize risk of hypoglycemia.  We will recheck A1c in 3 months to make sure it continues to trend down: Please have lab draw prior to visit in August.

## 2024-07-26 ENCOUNTER — LAB REQUISITION (OUTPATIENT)
Dept: LAB | Facility: CLINIC | Age: 84
End: 2024-07-26
Payer: COMMERCIAL

## 2024-07-26 DIAGNOSIS — I10 ESSENTIAL (PRIMARY) HYPERTENSION: ICD-10-CM

## 2024-07-29 LAB
ANION GAP SERPL CALCULATED.3IONS-SCNC: 11 MMOL/L (ref 7–15)
BASOPHILS # BLD AUTO: 0.1 10E3/UL (ref 0–0.2)
BASOPHILS NFR BLD AUTO: 1 %
BUN SERPL-MCNC: 20.7 MG/DL (ref 8–23)
CALCIUM SERPL-MCNC: 8.8 MG/DL (ref 8.8–10.4)
CHLORIDE SERPL-SCNC: 104 MMOL/L (ref 98–107)
CREAT SERPL-MCNC: 1.25 MG/DL (ref 0.51–0.95)
EGFRCR SERPLBLD CKD-EPI 2021: 43 ML/MIN/1.73M2
EOSINOPHIL # BLD AUTO: 0.2 10E3/UL (ref 0–0.7)
EOSINOPHIL NFR BLD AUTO: 2 %
ERYTHROCYTE [DISTWIDTH] IN BLOOD BY AUTOMATED COUNT: 21.7 % (ref 10–15)
GLUCOSE SERPL-MCNC: 89 MG/DL (ref 70–99)
HCO3 SERPL-SCNC: 24 MMOL/L (ref 22–29)
HCT VFR BLD AUTO: 29.7 % (ref 35–47)
HGB BLD-MCNC: 8.6 G/DL (ref 11.7–15.7)
IMM GRANULOCYTES # BLD: 0.1 10E3/UL
IMM GRANULOCYTES NFR BLD: 1 %
LYMPHOCYTES # BLD AUTO: 1.3 10E3/UL (ref 0.8–5.3)
LYMPHOCYTES NFR BLD AUTO: 18 %
MCH RBC QN AUTO: 24.8 PG (ref 26.5–33)
MCHC RBC AUTO-ENTMCNC: 29 G/DL (ref 31.5–36.5)
MCV RBC AUTO: 86 FL (ref 78–100)
MONOCYTES # BLD AUTO: 0.8 10E3/UL (ref 0–1.3)
MONOCYTES NFR BLD AUTO: 11 %
NEUTROPHILS # BLD AUTO: 4.8 10E3/UL (ref 1.6–8.3)
NEUTROPHILS NFR BLD AUTO: 67 %
NRBC # BLD AUTO: 0 10E3/UL
NRBC BLD AUTO-RTO: 0 /100
PLATELET # BLD AUTO: 397 10E3/UL (ref 150–450)
POTASSIUM SERPL-SCNC: 4.6 MMOL/L (ref 3.4–5.3)
RBC # BLD AUTO: 3.47 10E6/UL (ref 3.8–5.2)
SODIUM SERPL-SCNC: 139 MMOL/L (ref 135–145)
WBC # BLD AUTO: 7.2 10E3/UL (ref 4–11)

## 2024-07-29 PROCEDURE — 80048 BASIC METABOLIC PNL TOTAL CA: CPT | Mod: ORL | Performed by: FAMILY MEDICINE

## 2024-07-29 PROCEDURE — 36415 COLL VENOUS BLD VENIPUNCTURE: CPT | Mod: ORL | Performed by: FAMILY MEDICINE

## 2024-07-29 PROCEDURE — P9604 ONE-WAY ALLOW PRORATED TRIP: HCPCS | Mod: ORL | Performed by: FAMILY MEDICINE

## 2024-07-29 PROCEDURE — 85025 COMPLETE CBC W/AUTO DIFF WBC: CPT | Mod: ORL | Performed by: FAMILY MEDICINE

## 2024-08-02 ENCOUNTER — LAB REQUISITION (OUTPATIENT)
Dept: LAB | Facility: CLINIC | Age: 84
End: 2024-08-02
Payer: COMMERCIAL

## 2024-08-02 DIAGNOSIS — N18.32 CHRONIC KIDNEY DISEASE, STAGE 3B (H): ICD-10-CM

## 2024-08-02 DIAGNOSIS — R19.7 DIARRHEA, UNSPECIFIED: ICD-10-CM

## 2024-08-02 DIAGNOSIS — I50.32 CHRONIC DIASTOLIC (CONGESTIVE) HEART FAILURE (H): ICD-10-CM

## 2024-08-02 LAB — C DIFF TOX B STL QL: NEGATIVE

## 2024-08-02 PROCEDURE — 87493 C DIFF AMPLIFIED PROBE: CPT | Mod: ORL | Performed by: FAMILY MEDICINE

## 2024-08-05 LAB
ANION GAP SERPL CALCULATED.3IONS-SCNC: 14 MMOL/L (ref 7–15)
BUN SERPL-MCNC: 16.4 MG/DL (ref 8–23)
CALCIUM SERPL-MCNC: 8.6 MG/DL (ref 8.8–10.4)
CHLORIDE SERPL-SCNC: 106 MMOL/L (ref 98–107)
CREAT SERPL-MCNC: 1.12 MG/DL (ref 0.51–0.95)
EGFRCR SERPLBLD CKD-EPI 2021: 49 ML/MIN/1.73M2
ERYTHROCYTE [DISTWIDTH] IN BLOOD BY AUTOMATED COUNT: 20.4 % (ref 10–15)
GLUCOSE SERPL-MCNC: 114 MG/DL (ref 70–99)
HCO3 SERPL-SCNC: 20 MMOL/L (ref 22–29)
HCT VFR BLD AUTO: 33.4 % (ref 35–47)
HGB BLD-MCNC: 9.4 G/DL (ref 11.7–15.7)
MCH RBC QN AUTO: 24.2 PG (ref 26.5–33)
MCHC RBC AUTO-ENTMCNC: 28.1 G/DL (ref 31.5–36.5)
MCV RBC AUTO: 86 FL (ref 78–100)
PLATELET # BLD AUTO: 335 10E3/UL (ref 150–450)
POTASSIUM SERPL-SCNC: 4.8 MMOL/L (ref 3.4–5.3)
RBC # BLD AUTO: 3.88 10E6/UL (ref 3.8–5.2)
SODIUM SERPL-SCNC: 140 MMOL/L (ref 135–145)
WBC # BLD AUTO: 8.9 10E3/UL (ref 4–11)

## 2024-08-05 PROCEDURE — 36415 COLL VENOUS BLD VENIPUNCTURE: CPT | Mod: ORL | Performed by: FAMILY MEDICINE

## 2024-08-05 PROCEDURE — 85027 COMPLETE CBC AUTOMATED: CPT | Mod: ORL | Performed by: FAMILY MEDICINE

## 2024-08-05 PROCEDURE — 80048 BASIC METABOLIC PNL TOTAL CA: CPT | Mod: ORL | Performed by: FAMILY MEDICINE

## 2024-08-05 PROCEDURE — 83735 ASSAY OF MAGNESIUM: CPT | Mod: ORL | Performed by: FAMILY MEDICINE

## 2024-08-05 PROCEDURE — P9604 ONE-WAY ALLOW PRORATED TRIP: HCPCS | Mod: ORL | Performed by: FAMILY MEDICINE

## 2024-08-06 LAB — MAGNESIUM SERPL-MCNC: 2.3 MG/DL (ref 1.7–2.3)

## 2024-08-08 ENCOUNTER — LAB REQUISITION (OUTPATIENT)
Dept: LAB | Facility: CLINIC | Age: 84
End: 2024-08-08
Payer: COMMERCIAL

## 2024-08-08 DIAGNOSIS — I48.20 CHRONIC ATRIAL FIBRILLATION, UNSPECIFIED (H): ICD-10-CM

## 2024-08-09 ENCOUNTER — LAB (OUTPATIENT)
Dept: LAB | Facility: CLINIC | Age: 84
End: 2024-08-09
Payer: COMMERCIAL

## 2024-08-09 DIAGNOSIS — E11.65 TYPE 2 DIABETES MELLITUS WITH HYPERGLYCEMIA, WITHOUT LONG-TERM CURRENT USE OF INSULIN (H): ICD-10-CM

## 2024-08-09 LAB
ANION GAP SERPL CALCULATED.3IONS-SCNC: 12 MMOL/L (ref 7–15)
BUN SERPL-MCNC: 23.1 MG/DL (ref 8–23)
CALCIUM SERPL-MCNC: 9.6 MG/DL (ref 8.8–10.4)
CHLORIDE SERPL-SCNC: 103 MMOL/L (ref 98–107)
CREAT SERPL-MCNC: 1.35 MG/DL (ref 0.51–0.95)
CREAT UR-MCNC: 136 MG/DL
EGFRCR SERPLBLD CKD-EPI 2021: 39 ML/MIN/1.73M2
GLUCOSE SERPL-MCNC: 132 MG/DL (ref 70–99)
HBA1C MFR BLD: 6.9 % (ref 0–5.6)
HCO3 SERPL-SCNC: 25 MMOL/L (ref 22–29)
MICROALBUMIN UR-MCNC: 18.4 MG/L
MICROALBUMIN/CREAT UR: 13.53 MG/G CR (ref 0–25)
POTASSIUM SERPL-SCNC: 5.1 MMOL/L (ref 3.4–5.3)
SODIUM SERPL-SCNC: 140 MMOL/L (ref 135–145)

## 2024-08-09 PROCEDURE — 82570 ASSAY OF URINE CREATININE: CPT

## 2024-08-09 PROCEDURE — 83036 HEMOGLOBIN GLYCOSYLATED A1C: CPT

## 2024-08-09 PROCEDURE — 36415 COLL VENOUS BLD VENIPUNCTURE: CPT

## 2024-08-09 PROCEDURE — 82043 UR ALBUMIN QUANTITATIVE: CPT

## 2024-08-09 PROCEDURE — 80048 BASIC METABOLIC PNL TOTAL CA: CPT

## 2024-08-12 ENCOUNTER — LAB REQUISITION (OUTPATIENT)
Dept: LAB | Facility: CLINIC | Age: 84
End: 2024-08-12
Payer: COMMERCIAL

## 2024-08-12 DIAGNOSIS — I48.0 PAROXYSMAL ATRIAL FIBRILLATION (H): ICD-10-CM

## 2024-08-12 LAB — INR PPP: 1.36 (ref 0.85–1.15)

## 2024-08-12 PROCEDURE — 36415 COLL VENOUS BLD VENIPUNCTURE: CPT | Mod: ORL | Performed by: FAMILY MEDICINE

## 2024-08-12 PROCEDURE — P9603 ONE-WAY ALLOW PRORATED MILES: HCPCS | Mod: ORL | Performed by: FAMILY MEDICINE

## 2024-08-12 PROCEDURE — 85610 PROTHROMBIN TIME: CPT | Mod: ORL | Performed by: FAMILY MEDICINE

## 2024-08-15 ENCOUNTER — LAB REQUISITION (OUTPATIENT)
Dept: LAB | Facility: CLINIC | Age: 84
End: 2024-08-15
Payer: COMMERCIAL

## 2024-08-15 DIAGNOSIS — I10 ESSENTIAL (PRIMARY) HYPERTENSION: ICD-10-CM

## 2024-08-15 DIAGNOSIS — I48.91 UNSPECIFIED ATRIAL FIBRILLATION (H): ICD-10-CM

## 2024-08-16 ENCOUNTER — LAB REQUISITION (OUTPATIENT)
Dept: LAB | Facility: CLINIC | Age: 84
End: 2024-08-16
Payer: COMMERCIAL

## 2024-08-16 DIAGNOSIS — I10 ESSENTIAL (PRIMARY) HYPERTENSION: ICD-10-CM

## 2024-08-16 DIAGNOSIS — I48.91 UNSPECIFIED ATRIAL FIBRILLATION (H): ICD-10-CM

## 2024-08-19 LAB
ANION GAP SERPL CALCULATED.3IONS-SCNC: 15 MMOL/L (ref 7–15)
BUN SERPL-MCNC: 23 MG/DL (ref 8–23)
CALCIUM SERPL-MCNC: 8.9 MG/DL (ref 8.8–10.4)
CHLORIDE SERPL-SCNC: 105 MMOL/L (ref 98–107)
CREAT SERPL-MCNC: 1.22 MG/DL (ref 0.51–0.95)
EGFRCR SERPLBLD CKD-EPI 2021: 44 ML/MIN/1.73M2
GLUCOSE SERPL-MCNC: 184 MG/DL (ref 70–99)
HCO3 SERPL-SCNC: 20 MMOL/L (ref 22–29)
INR PPP: 2.46 (ref 0.85–1.15)
POTASSIUM SERPL-SCNC: 3.8 MMOL/L (ref 3.4–5.3)
SODIUM SERPL-SCNC: 140 MMOL/L (ref 135–145)

## 2024-08-19 PROCEDURE — 85610 PROTHROMBIN TIME: CPT | Mod: ORL | Performed by: FAMILY MEDICINE

## 2024-08-19 PROCEDURE — 36415 COLL VENOUS BLD VENIPUNCTURE: CPT | Mod: ORL | Performed by: FAMILY MEDICINE

## 2024-08-19 PROCEDURE — 80048 BASIC METABOLIC PNL TOTAL CA: CPT | Mod: ORL | Performed by: FAMILY MEDICINE

## 2024-08-19 PROCEDURE — P9604 ONE-WAY ALLOW PRORATED TRIP: HCPCS | Mod: ORL | Performed by: FAMILY MEDICINE

## 2024-08-20 ENCOUNTER — LAB REQUISITION (OUTPATIENT)
Dept: LAB | Facility: CLINIC | Age: 84
End: 2024-08-20
Payer: COMMERCIAL

## 2024-08-20 DIAGNOSIS — I48.0 PAROXYSMAL ATRIAL FIBRILLATION (H): ICD-10-CM

## 2024-08-21 ENCOUNTER — TELEPHONE (OUTPATIENT)
Dept: ENDOCRINOLOGY | Facility: CLINIC | Age: 84
End: 2024-08-21
Payer: COMMERCIAL

## 2024-08-21 DIAGNOSIS — E04.2 MULTIPLE THYROID NODULES: ICD-10-CM

## 2024-08-21 DIAGNOSIS — E11.65 TYPE 2 DIABETES MELLITUS WITH HYPERGLYCEMIA, WITHOUT LONG-TERM CURRENT USE OF INSULIN (H): Primary | ICD-10-CM

## 2024-08-21 NOTE — TELEPHONE ENCOUNTER
Endocrine team, please update patient with the following message:  -Hemoglobin A1c is much improved  -Kidney function has varied over the past year: It is generally stable  -Urine protein level is normal  -Continue current medication regimen without changes and follow-up in November as scheduled: Please have lab draw and thyroid ultrasound prior to appointment

## 2024-08-22 ENCOUNTER — LAB REQUISITION (OUTPATIENT)
Dept: LAB | Facility: CLINIC | Age: 84
End: 2024-08-22
Payer: COMMERCIAL

## 2024-08-22 DIAGNOSIS — I48.0 PAROXYSMAL ATRIAL FIBRILLATION (H): ICD-10-CM

## 2024-08-22 LAB — INR PPP: 3.03 (ref 0.85–1.15)

## 2024-08-22 PROCEDURE — P9603 ONE-WAY ALLOW PRORATED MILES: HCPCS | Mod: ORL | Performed by: FAMILY MEDICINE

## 2024-08-22 PROCEDURE — 36415 COLL VENOUS BLD VENIPUNCTURE: CPT | Mod: ORL | Performed by: FAMILY MEDICINE

## 2024-08-22 PROCEDURE — 85610 PROTHROMBIN TIME: CPT | Mod: ORL | Performed by: FAMILY MEDICINE

## 2024-08-26 LAB — INR PPP: 1.67 (ref 0.85–1.15)

## 2024-08-26 PROCEDURE — P9604 ONE-WAY ALLOW PRORATED TRIP: HCPCS | Mod: ORL | Performed by: FAMILY MEDICINE

## 2024-08-26 PROCEDURE — 85610 PROTHROMBIN TIME: CPT | Mod: ORL | Performed by: FAMILY MEDICINE

## 2024-08-26 PROCEDURE — 36415 COLL VENOUS BLD VENIPUNCTURE: CPT | Mod: ORL | Performed by: FAMILY MEDICINE

## 2024-08-27 ENCOUNTER — LAB REQUISITION (OUTPATIENT)
Dept: LAB | Facility: CLINIC | Age: 84
End: 2024-08-27
Payer: COMMERCIAL

## 2024-08-27 DIAGNOSIS — I48.0 PAROXYSMAL ATRIAL FIBRILLATION (H): ICD-10-CM

## 2024-08-29 LAB — INR PPP: 1.4 (ref 0.85–1.15)

## 2024-08-29 PROCEDURE — 36415 COLL VENOUS BLD VENIPUNCTURE: CPT | Mod: ORL | Performed by: FAMILY MEDICINE

## 2024-08-29 PROCEDURE — 85610 PROTHROMBIN TIME: CPT | Mod: ORL | Performed by: FAMILY MEDICINE

## 2024-08-29 PROCEDURE — P9604 ONE-WAY ALLOW PRORATED TRIP: HCPCS | Mod: ORL | Performed by: FAMILY MEDICINE

## 2024-08-30 ENCOUNTER — LAB REQUISITION (OUTPATIENT)
Dept: LAB | Facility: CLINIC | Age: 84
End: 2024-08-30
Payer: COMMERCIAL

## 2024-08-30 DIAGNOSIS — I48.91 UNSPECIFIED ATRIAL FIBRILLATION (H): ICD-10-CM

## 2024-09-16 ENCOUNTER — LAB REQUISITION (OUTPATIENT)
Dept: LAB | Facility: HOSPITAL | Age: 84
End: 2024-09-16
Payer: COMMERCIAL

## 2024-09-16 DIAGNOSIS — D64.9 ANEMIA, UNSPECIFIED: ICD-10-CM

## 2024-09-16 LAB
ERYTHROCYTE [DISTWIDTH] IN BLOOD BY AUTOMATED COUNT: 18.5 % (ref 10–15)
HCT VFR BLD AUTO: 25.8 % (ref 35–47)
HGB BLD-MCNC: 7.5 G/DL (ref 11.7–15.7)
INR PPP: 3.65 (ref 0.85–1.15)
MCH RBC QN AUTO: 23.1 PG (ref 26.5–33)
MCHC RBC AUTO-ENTMCNC: 29.1 G/DL (ref 31.5–36.5)
MCV RBC AUTO: 79 FL (ref 78–100)
PLATELET # BLD AUTO: 502 10E3/UL (ref 150–450)
RBC # BLD AUTO: 3.25 10E6/UL (ref 3.8–5.2)
WBC # BLD AUTO: 15.4 10E3/UL (ref 4–11)

## 2024-09-16 PROCEDURE — 85610 PROTHROMBIN TIME: CPT | Mod: ORL | Performed by: FAMILY MEDICINE

## 2024-09-16 PROCEDURE — 85027 COMPLETE CBC AUTOMATED: CPT | Mod: ORL | Performed by: FAMILY MEDICINE

## 2024-09-26 ENCOUNTER — LAB REQUISITION (OUTPATIENT)
Dept: LAB | Facility: HOSPITAL | Age: 84
End: 2024-09-26
Payer: COMMERCIAL

## 2024-09-26 DIAGNOSIS — Z79.01 LONG TERM (CURRENT) USE OF ANTICOAGULANTS: ICD-10-CM

## 2024-09-26 LAB — INR PPP: 1.25 (ref 0.85–1.15)

## 2024-09-26 PROCEDURE — 85610 PROTHROMBIN TIME: CPT | Mod: ORL | Performed by: FAMILY MEDICINE

## 2024-10-10 ENCOUNTER — LAB REQUISITION (OUTPATIENT)
Dept: LAB | Facility: CLINIC | Age: 84
End: 2024-10-10
Payer: COMMERCIAL

## 2024-10-10 DIAGNOSIS — S72.001D FRACTURE OF UNSPECIFIED PART OF NECK OF RIGHT FEMUR, SUBSEQUENT ENCOUNTER FOR CLOSED FRACTURE WITH ROUTINE HEALING: ICD-10-CM

## 2024-10-11 ENCOUNTER — ANTICOAGULATION THERAPY VISIT (OUTPATIENT)
Dept: ANTICOAGULATION | Facility: CLINIC | Age: 84
End: 2024-10-11

## 2024-10-11 LAB
ANION GAP SERPL CALCULATED.3IONS-SCNC: 13 MMOL/L (ref 7–15)
BUN SERPL-MCNC: 25.7 MG/DL (ref 8–23)
CALCIUM SERPL-MCNC: 8.6 MG/DL (ref 8.8–10.4)
CHLORIDE SERPL-SCNC: 104 MMOL/L (ref 98–107)
CREAT SERPL-MCNC: 1.15 MG/DL (ref 0.51–0.95)
EGFRCR SERPLBLD CKD-EPI 2021: 47 ML/MIN/1.73M2
ERYTHROCYTE [DISTWIDTH] IN BLOOD BY AUTOMATED COUNT: 19.4 % (ref 10–15)
GLUCOSE SERPL-MCNC: 167 MG/DL (ref 70–99)
HCO3 SERPL-SCNC: 19 MMOL/L (ref 22–29)
HCT VFR BLD AUTO: 30 % (ref 35–47)
HGB BLD-MCNC: 8.4 G/DL (ref 11.7–15.7)
MCH RBC QN AUTO: 23.3 PG (ref 26.5–33)
MCHC RBC AUTO-ENTMCNC: 28 G/DL (ref 31.5–36.5)
MCV RBC AUTO: 83 FL (ref 78–100)
PLATELET # BLD AUTO: 515 10E3/UL (ref 150–450)
POTASSIUM SERPL-SCNC: 3.9 MMOL/L (ref 3.4–5.3)
RBC # BLD AUTO: 3.61 10E6/UL (ref 3.8–5.2)
SODIUM SERPL-SCNC: 136 MMOL/L (ref 135–145)
WBC # BLD AUTO: 11.8 10E3/UL (ref 4–11)

## 2024-10-11 PROCEDURE — 80048 BASIC METABOLIC PNL TOTAL CA: CPT | Mod: ORL | Performed by: NURSE PRACTITIONER

## 2024-10-11 PROCEDURE — 85027 COMPLETE CBC AUTOMATED: CPT | Mod: ORL | Performed by: NURSE PRACTITIONER

## 2024-10-11 PROCEDURE — P9604 ONE-WAY ALLOW PRORATED TRIP: HCPCS | Mod: ORL | Performed by: NURSE PRACTITIONER

## 2024-10-11 PROCEDURE — 36415 COLL VENOUS BLD VENIPUNCTURE: CPT | Mod: ORL | Performed by: NURSE PRACTITIONER

## 2024-10-11 NOTE — PROGRESS NOTES
Received a call from Celia,  with [facility] regarding this patient.  Appears this patient is seen with [facility] and they have been managing INRs.  This patient has no history with [facility].  Celia to call  ACC.    Melany Lagunas, RN  Anticoagulation Nurse - Central [facility]R, [facility] [address] [place] [address] [phone] Paul Bloomberg, MD/Maria T Krishna NP

## 2024-10-12 ENCOUNTER — TELEPHONE (OUTPATIENT)
Dept: GERIATRICS | Facility: CLINIC | Age: 84
End: 2024-10-12
Payer: COMMERCIAL

## 2024-10-12 NOTE — TELEPHONE ENCOUNTER
Taylor Sanford is a 84 year old  (1940), Nurse called today to report: Patient with a surgical wound lower extremity that is open to air but weeping clear fluid per the nurse she does have leg swelling.  1+.  No redness or purulent drainage noted.  Orders given for compression, ice twice daily x 3 days and to apply a dressing and change as needed.  Follow-up with provider         Electronically signed by:   Sandy Prasad CNP

## 2024-11-05 ENCOUNTER — LAB REQUISITION (OUTPATIENT)
Dept: LAB | Facility: CLINIC | Age: 84
End: 2024-11-05
Payer: COMMERCIAL

## 2024-11-05 DIAGNOSIS — E87.8 OTHER DISORDERS OF ELECTROLYTE AND FLUID BALANCE, NOT ELSEWHERE CLASSIFIED: ICD-10-CM

## 2024-11-05 DIAGNOSIS — D64.9 ANEMIA, UNSPECIFIED: ICD-10-CM

## 2024-11-06 LAB
ANION GAP SERPL CALCULATED.3IONS-SCNC: 10 MMOL/L (ref 7–15)
BUN SERPL-MCNC: 15.4 MG/DL (ref 8–23)
CALCIUM SERPL-MCNC: 8.6 MG/DL (ref 8.8–10.4)
CHLORIDE SERPL-SCNC: 106 MMOL/L (ref 98–107)
CREAT SERPL-MCNC: 0.72 MG/DL (ref 0.51–0.95)
EGFRCR SERPLBLD CKD-EPI 2021: 83 ML/MIN/1.73M2
ERYTHROCYTE [DISTWIDTH] IN BLOOD BY AUTOMATED COUNT: 19.7 % (ref 10–15)
GLUCOSE SERPL-MCNC: 94 MG/DL (ref 70–99)
HCO3 SERPL-SCNC: 23 MMOL/L (ref 22–29)
HCT VFR BLD AUTO: 29 % (ref 35–47)
HGB BLD-MCNC: 8 G/DL (ref 11.7–15.7)
MAGNESIUM SERPL-MCNC: 1.5 MG/DL (ref 1.7–2.3)
MCH RBC QN AUTO: 23.6 PG (ref 26.5–33)
MCHC RBC AUTO-ENTMCNC: 27.6 G/DL (ref 31.5–36.5)
MCV RBC AUTO: 86 FL (ref 78–100)
PLATELET # BLD AUTO: 537 10E3/UL (ref 150–450)
POTASSIUM SERPL-SCNC: 3.6 MMOL/L (ref 3.4–5.3)
RBC # BLD AUTO: 3.39 10E6/UL (ref 3.8–5.2)
SODIUM SERPL-SCNC: 139 MMOL/L (ref 135–145)
WBC # BLD AUTO: 9.5 10E3/UL (ref 4–11)

## 2024-11-06 PROCEDURE — 80048 BASIC METABOLIC PNL TOTAL CA: CPT | Mod: ORL | Performed by: FAMILY MEDICINE

## 2024-11-06 PROCEDURE — 83735 ASSAY OF MAGNESIUM: CPT | Mod: ORL | Performed by: FAMILY MEDICINE

## 2024-11-06 PROCEDURE — 85027 COMPLETE CBC AUTOMATED: CPT | Mod: ORL | Performed by: FAMILY MEDICINE

## 2024-11-06 PROCEDURE — P9604 ONE-WAY ALLOW PRORATED TRIP: HCPCS | Mod: ORL | Performed by: FAMILY MEDICINE

## 2024-11-06 PROCEDURE — 36415 COLL VENOUS BLD VENIPUNCTURE: CPT | Mod: ORL | Performed by: FAMILY MEDICINE

## 2024-11-11 ENCOUNTER — TELEPHONE (OUTPATIENT)
Dept: ENDOCRINOLOGY | Facility: CLINIC | Age: 84
End: 2024-11-11
Payer: COMMERCIAL

## 2024-11-11 NOTE — TELEPHONE ENCOUNTER
[facility] Call Center    Phone Message    May a detailed message be left on voicemail: yes     Reason for Call: Other: Patient calling stating she is currently in a transitional care facility and would like her lab orders faxed [facility], Patient did not have fax number, she would also like to discuss her upcoming visit as she may not be discharged from the facility.      Action Taken: Message routed to:  [facility] ([facility] endo    Travel Screening: Not Applicable     Date of Service:

## 2024-11-12 ENCOUNTER — LAB REQUISITION (OUTPATIENT)
Dept: LAB | Facility: CLINIC | Age: 84
End: 2024-11-12
Payer: COMMERCIAL

## 2024-11-12 DIAGNOSIS — E04.2 NONTOXIC MULTINODULAR GOITER: ICD-10-CM

## 2024-11-12 DIAGNOSIS — E11.51 TYPE 2 DIABETES MELLITUS WITH DIABETIC PERIPHERAL ANGIOPATHY WITHOUT GANGRENE (H): ICD-10-CM

## 2024-11-13 LAB
ANION GAP SERPL CALCULATED.3IONS-SCNC: 12 MMOL/L (ref 7–15)
BUN SERPL-MCNC: 21.7 MG/DL (ref 8–23)
CALCIUM SERPL-MCNC: 8.9 MG/DL (ref 8.8–10.4)
CHLORIDE SERPL-SCNC: 103 MMOL/L (ref 98–107)
CREAT SERPL-MCNC: 0.76 MG/DL (ref 0.51–0.95)
EGFRCR SERPLBLD CKD-EPI 2021: 78 ML/MIN/1.73M2
EST. AVERAGE GLUCOSE BLD GHB EST-MCNC: 160 MG/DL
GLUCOSE SERPL-MCNC: 108 MG/DL (ref 70–99)
HBA1C MFR BLD: 7.2 %
HCO3 SERPL-SCNC: 24 MMOL/L (ref 22–29)
POTASSIUM SERPL-SCNC: 3.7 MMOL/L (ref 3.4–5.3)
SODIUM SERPL-SCNC: 139 MMOL/L (ref 135–145)
T4 FREE SERPL-MCNC: 1.52 NG/DL (ref 0.9–1.7)
TSH SERPL DL<=0.005 MIU/L-ACNC: 0.52 UIU/ML (ref 0.3–4.2)

## 2024-11-13 PROCEDURE — 80048 BASIC METABOLIC PNL TOTAL CA: CPT | Mod: ORL | Performed by: INTERNAL MEDICINE

## 2024-11-13 PROCEDURE — 84439 ASSAY OF FREE THYROXINE: CPT | Mod: ORL | Performed by: INTERNAL MEDICINE

## 2024-11-13 PROCEDURE — 36415 COLL VENOUS BLD VENIPUNCTURE: CPT | Mod: ORL | Performed by: INTERNAL MEDICINE

## 2024-11-13 PROCEDURE — 80048 BASIC METABOLIC PNL TOTAL CA: CPT | Mod: ORL

## 2024-11-13 PROCEDURE — 84439 ASSAY OF FREE THYROXINE: CPT | Mod: ORL

## 2024-11-13 PROCEDURE — P9604 ONE-WAY ALLOW PRORATED TRIP: HCPCS | Mod: ORL

## 2024-11-13 PROCEDURE — 83036 HEMOGLOBIN GLYCOSYLATED A1C: CPT | Mod: ORL

## 2024-11-13 PROCEDURE — P9604 ONE-WAY ALLOW PRORATED TRIP: HCPCS | Mod: ORL | Performed by: INTERNAL MEDICINE

## 2024-11-13 PROCEDURE — 83036 HEMOGLOBIN GLYCOSYLATED A1C: CPT | Mod: ORL | Performed by: INTERNAL MEDICINE

## 2024-11-13 PROCEDURE — 84443 ASSAY THYROID STIM HORMONE: CPT | Mod: ORL

## 2024-11-13 PROCEDURE — 36415 COLL VENOUS BLD VENIPUNCTURE: CPT | Mod: ORL

## 2024-11-13 PROCEDURE — 84443 ASSAY THYROID STIM HORMONE: CPT | Mod: ORL | Performed by: INTERNAL MEDICINE

## 2024-11-13 PROCEDURE — 84295 ASSAY OF SERUM SODIUM: CPT | Mod: ORL | Performed by: INTERNAL MEDICINE

## 2024-11-29 ENCOUNTER — LAB REQUISITION (OUTPATIENT)
Dept: LAB | Facility: CLINIC | Age: 84
End: 2024-11-29
Payer: COMMERCIAL

## 2024-11-29 DIAGNOSIS — D64.9 ANEMIA, UNSPECIFIED: ICD-10-CM

## 2024-12-02 LAB
ERYTHROCYTE [DISTWIDTH] IN BLOOD BY AUTOMATED COUNT: 19.4 % (ref 10–15)
HCT VFR BLD AUTO: 30.4 % (ref 35–47)
HGB BLD-MCNC: 8.7 G/DL (ref 11.7–15.7)
MCH RBC QN AUTO: 23.5 PG (ref 26.5–33)
MCHC RBC AUTO-ENTMCNC: 28.6 G/DL (ref 31.5–36.5)
MCV RBC AUTO: 82 FL (ref 78–100)
PLATELET # BLD AUTO: 435 10E3/UL (ref 150–450)
RBC # BLD AUTO: 3.71 10E6/UL (ref 3.8–5.2)
WBC # BLD AUTO: 9.9 10E3/UL (ref 4–11)

## 2024-12-02 PROCEDURE — P9603 ONE-WAY ALLOW PRORATED MILES: HCPCS | Mod: ORL | Performed by: FAMILY MEDICINE

## 2024-12-02 PROCEDURE — 36415 COLL VENOUS BLD VENIPUNCTURE: CPT | Mod: ORL | Performed by: FAMILY MEDICINE

## 2024-12-02 PROCEDURE — 85027 COMPLETE CBC AUTOMATED: CPT | Mod: ORL | Performed by: FAMILY MEDICINE

## 2024-12-05 ENCOUNTER — TELEPHONE (OUTPATIENT)
Dept: ENDOCRINOLOGY | Facility: CLINIC | Age: 84
End: 2024-12-05
Payer: COMMERCIAL

## 2024-12-05 NOTE — TELEPHONE ENCOUNTER
Please update patient with the following message:  -Labs show normal thyroid function tests, electrolytes and kidney function.  Hemoglobin A1c is at 7.2%.  -I see Ms. Sanford needed to cancel her follow-up endocrinology visit last month: I hope she is well.  We can plan for follow-up in endocrinology if patient is able at our next available opening.

## 2024-12-12 ENCOUNTER — LAB REQUISITION (OUTPATIENT)
Dept: LAB | Facility: CLINIC | Age: 84
End: 2024-12-12
Payer: COMMERCIAL

## 2024-12-12 DIAGNOSIS — D64.9 ANEMIA, UNSPECIFIED: ICD-10-CM

## 2024-12-16 LAB
ERYTHROCYTE [DISTWIDTH] IN BLOOD BY AUTOMATED COUNT: 20.5 % (ref 10–15)
HCT VFR BLD AUTO: 33.6 % (ref 35–47)
HGB BLD-MCNC: 9.6 G/DL (ref 11.7–15.7)
MCH RBC QN AUTO: 24.2 PG (ref 26.5–33)
MCHC RBC AUTO-ENTMCNC: 28.6 G/DL (ref 31.5–36.5)
MCV RBC AUTO: 85 FL (ref 78–100)
PLATELET # BLD AUTO: 312 10E3/UL (ref 150–450)
RBC # BLD AUTO: 3.96 10E6/UL (ref 3.8–5.2)
WBC # BLD AUTO: 7 10E3/UL (ref 4–11)

## 2024-12-16 PROCEDURE — P9604 ONE-WAY ALLOW PRORATED TRIP: HCPCS | Mod: ORL | Performed by: FAMILY MEDICINE

## 2024-12-16 PROCEDURE — 36415 COLL VENOUS BLD VENIPUNCTURE: CPT | Mod: ORL | Performed by: FAMILY MEDICINE

## 2024-12-16 PROCEDURE — 85027 COMPLETE CBC AUTOMATED: CPT | Mod: ORL | Performed by: FAMILY MEDICINE

## 2024-12-26 ENCOUNTER — LAB REQUISITION (OUTPATIENT)
Dept: LAB | Facility: CLINIC | Age: 84
End: 2024-12-26
Payer: COMMERCIAL

## 2024-12-26 DIAGNOSIS — D64.9 ANEMIA, UNSPECIFIED: ICD-10-CM

## 2024-12-27 LAB — HGB BLD-MCNC: 7.9 G/DL (ref 11.7–15.7)

## 2024-12-27 PROCEDURE — 36415 COLL VENOUS BLD VENIPUNCTURE: CPT | Mod: ORL | Performed by: FAMILY MEDICINE

## 2024-12-27 PROCEDURE — P9603 ONE-WAY ALLOW PRORATED MILES: HCPCS | Mod: ORL | Performed by: FAMILY MEDICINE

## 2024-12-27 PROCEDURE — 85018 HEMOGLOBIN: CPT | Mod: ORL | Performed by: FAMILY MEDICINE

## 2025-05-20 ENCOUNTER — LAB REQUISITION (OUTPATIENT)
Dept: LAB | Facility: HOSPITAL | Age: 85
End: 2025-05-20
Payer: COMMERCIAL

## 2025-05-20 DIAGNOSIS — T87.89 OTHER COMPLICATIONS OF AMPUTATION STUMP (H): ICD-10-CM

## 2025-05-20 DIAGNOSIS — E11.59 TYPE 2 DIABETES MELLITUS WITH OTHER CIRCULATORY COMPLICATIONS (H): ICD-10-CM

## 2025-05-20 DIAGNOSIS — N18.32 CHRONIC KIDNEY DISEASE, STAGE 3B (H): ICD-10-CM

## 2025-05-20 LAB
ANION GAP SERPL CALCULATED.3IONS-SCNC: 8 MMOL/L (ref 7–15)
BUN SERPL-MCNC: 34 MG/DL (ref 8–23)
CALCIUM SERPL-MCNC: 9.2 MG/DL (ref 8.8–10.4)
CHLORIDE SERPL-SCNC: 107 MMOL/L (ref 98–107)
CREAT SERPL-MCNC: 1.1 MG/DL (ref 0.51–0.95)
EGFRCR SERPLBLD CKD-EPI 2021: 50 ML/MIN/1.73M2
GLUCOSE SERPL-MCNC: 136 MG/DL (ref 70–99)
HCO3 SERPL-SCNC: 27 MMOL/L (ref 22–29)
POTASSIUM SERPL-SCNC: 4.5 MMOL/L (ref 3.4–5.3)
SODIUM SERPL-SCNC: 142 MMOL/L (ref 135–145)

## 2025-06-09 ENCOUNTER — LAB REQUISITION (OUTPATIENT)
Dept: LAB | Facility: CLINIC | Age: 85
End: 2025-06-09
Payer: COMMERCIAL

## 2025-06-09 DIAGNOSIS — I13.0 HYPERTENSIVE HEART AND CHRONIC KIDNEY DISEASE WITH HEART FAILURE AND STAGE 1 THROUGH STAGE 4 CHRONIC KIDNEY DISEASE, OR UNSPECIFIED CHRONIC KIDNEY DISEASE (H): ICD-10-CM

## 2025-06-09 LAB
ANION GAP SERPL CALCULATED.3IONS-SCNC: 13 MMOL/L (ref 7–15)
BUN SERPL-MCNC: 34 MG/DL (ref 8–23)
CALCIUM SERPL-MCNC: 9.8 MG/DL (ref 8.8–10.4)
CHLORIDE SERPL-SCNC: 102 MMOL/L (ref 98–107)
CREAT SERPL-MCNC: 1.22 MG/DL (ref 0.51–0.95)
EGFRCR SERPLBLD CKD-EPI 2021: 44 ML/MIN/1.73M2
GLUCOSE SERPL-MCNC: 174 MG/DL (ref 70–99)
HCO3 SERPL-SCNC: 27 MMOL/L (ref 22–29)
POTASSIUM SERPL-SCNC: 4.8 MMOL/L (ref 3.4–5.3)
SODIUM SERPL-SCNC: 142 MMOL/L (ref 135–145)

## 2025-06-09 PROCEDURE — 80048 BASIC METABOLIC PNL TOTAL CA: CPT | Mod: ORL | Performed by: INTERNAL MEDICINE
